# Patient Record
Sex: FEMALE | Race: BLACK OR AFRICAN AMERICAN | NOT HISPANIC OR LATINO | Employment: FULL TIME | URBAN - METROPOLITAN AREA
[De-identification: names, ages, dates, MRNs, and addresses within clinical notes are randomized per-mention and may not be internally consistent; named-entity substitution may affect disease eponyms.]

---

## 2017-06-29 ENCOUNTER — APPOINTMENT (OUTPATIENT)
Dept: RADIOLOGY | Facility: IMAGING CENTER | Age: 42
End: 2017-06-29
Attending: NURSE PRACTITIONER
Payer: COMMERCIAL

## 2017-06-29 ENCOUNTER — OFFICE VISIT (OUTPATIENT)
Dept: URGENT CARE | Facility: CLINIC | Age: 42
End: 2017-06-29
Payer: COMMERCIAL

## 2017-06-29 VITALS
OXYGEN SATURATION: 95 % | HEART RATE: 84 BPM | WEIGHT: 222 LBS | HEIGHT: 68 IN | RESPIRATION RATE: 16 BRPM | TEMPERATURE: 98.2 F | BODY MASS INDEX: 33.65 KG/M2

## 2017-06-29 DIAGNOSIS — S89.91XA KNEE INJURY, RIGHT, INITIAL ENCOUNTER: ICD-10-CM

## 2017-06-29 DIAGNOSIS — S89.92XA LEG INJURY, LEFT, INITIAL ENCOUNTER: ICD-10-CM

## 2017-06-29 DIAGNOSIS — S69.91XA HAND INJURY, RIGHT, INITIAL ENCOUNTER: ICD-10-CM

## 2017-06-29 DIAGNOSIS — S69.91XA WRIST INJURY, RIGHT, INITIAL ENCOUNTER: ICD-10-CM

## 2017-06-29 PROCEDURE — 73590 X-RAY EXAM OF LOWER LEG: CPT | Mod: TC | Performed by: NURSE PRACTITIONER

## 2017-06-29 PROCEDURE — 73562 X-RAY EXAM OF KNEE 3: CPT | Mod: TC | Performed by: NURSE PRACTITIONER

## 2017-06-29 PROCEDURE — 99203 OFFICE O/P NEW LOW 30 MIN: CPT | Performed by: NURSE PRACTITIONER

## 2017-06-29 PROCEDURE — 73130 X-RAY EXAM OF HAND: CPT | Mod: TC | Performed by: NURSE PRACTITIONER

## 2017-06-29 PROCEDURE — 73110 X-RAY EXAM OF WRIST: CPT | Mod: TC | Performed by: NURSE PRACTITIONER

## 2017-06-29 ASSESSMENT — ENCOUNTER SYMPTOMS
VOMITING: 0
DIZZINESS: 0
HEADACHES: 0
FEVER: 0
TINGLING: 0
CHILLS: 0
TREMORS: 0
NAUSEA: 0

## 2017-06-29 ASSESSMENT — PATIENT HEALTH QUESTIONNAIRE - PHQ9: CLINICAL INTERPRETATION OF PHQ2 SCORE: 0

## 2017-06-29 NOTE — MR AVS SNAPSHOT
"        Annalise Woodard   2017 1:45 PM   Office Visit   MRN: [id number]    Department:  [facility]   Dept Phone:  [phone]    Description:  Female : 1975   Provider:  Cathey J Hamman, A.P.N.           Reason for Visit     Leg Injury L leg,R knee, R hand fingers,neck pain, fell at the store over stool       Allergies as of 2017     No Known Allergies      You were diagnosed with     Leg injury, left, initial encounter   [6805804]       Knee injury, right, initial encounter   [[id number]]       Hand injury, right, initial encounter   [[id number]]       Wrist injury, right, initial encounter   [[id number]]         Vital Signs     Pulse Temperature Respirations Height Weight Body Mass Index    84 36.8 °C (98.2 °F) 16 1.727 m (5' 7.99\") 100.699 kg (222 lb) 33.76 kg/m2    Oxygen Saturation Last Menstrual Period Smoking Status             95% 2017 Never Smoker          Basic Information     Date Of Birth Sex Race Ethnicity Preferred Language    1975 Female Black or  Non- English      Health Maintenance     Patient has no pending health maintenance at this time      Current Immunizations     No immunizations on file.      Below and/or attached are the medications your provider expects you to take. Review all of your home medications and newly ordered medications with your provider and/or pharmacist. Follow medication instructions as directed by your provider and/or pharmacist. Please keep your medication list with you and share with your provider. Update the information when medications are discontinued, doses are changed, or new medications (including over-the-counter products) are added; and carry medication information at all times in the event of emergency situations     Allergies:  No Known Allergies          Medications  Valid as of: 2017 -  3:53 PM    Generic Name Brand Name Tablet Size Instructions for use    Hydrocodone-Acetaminophen (Tab) NORCO 5-325 MG " Take 1-2 Tabs by mouth every 6 hours as needed.        .                 Medicines prescribed today were sent to:     [facility] [address] [place], [place] - [address]    [address] [place] [address]    Phone: [phone] Fax: [phone]    Open 24 Hours?: No      Medication refill instructions:       If your prescription bottle indicates you have medication refills left, it is not necessary to call your provider’s office. Please contact your pharmacy and they will refill your medication.    If your prescription bottle indicates you do not have any refills left, you may request refills at any time through one of the following ways: The online Anaergia system (except Urgent Care), by calling your provider’s office, or by asking your pharmacy to contact your provider’s office with a refill request. Medication refills are processed only during regular business hours and may not be available until the next business day. Your provider may request additional information or to have a follow-up visit with you prior to refilling your medication.   *Please Note: Medication refills are assigned a new Rx number when refilled electronically. Your pharmacy may indicate that no refills were authorized even though a new prescription for the same medication is available at the pharmacy. Please request the medicine by name with the pharmacy before contacting your provider for a refill.        Your To Do List     Future Labs/Procedures Complete By Expires    DX-HAND 3+ RIGHT  As directed 6/29/2018    DX-KNEE 3 VIEWS RIGHT  As directed 6/29/2018    DX-TIBIA AND FIBULA LEFT  As directed 6/29/2018    DX-WRIST-COMPLETE 3+ RIGHT  As directed 6/29/2018         Anaergia Access Code: [email or web address]  Expires: 7/29/2017  3:53 PM    Anaergia  A secure, online tool to manage your health information     [employer]’s Anaergia® is a secure, online tool that connects you to your personalized health information from the privacy  of your home -- day or night - making it very easy for you to manage your healthcare. Once the activation process is completed, you can even access your medical information using the Piqora deejay, which is available for free in the Apple Deejay store or Google Play store.     Piqora provides the following levels of access (as shown below):   My Chart Features   [facility] Doctor [facility]  Specialists [facility]  Urgent  Care Non-[facility]  Primary Care  Doctor   Email your healthcare team securely and privately 24/7 X X X    Manage appointments: schedule your next appointment; view details of past/upcoming appointments X      Request prescription refills. X      View recent personal medical records, including lab and immunizations X X X X   View health record, including health history, allergies, medications X X X X   Read reports about your outpatient visits, procedures, consult and ER notes X X X X   See your discharge summary, which is a recap of your hospital and/or ER visit that includes your diagnosis, lab results, and care plan. X X       How to register for Piqora:  1. Go to  [email or web address].  2. Click on the Sign Up Now box, which takes you to the New Member Sign Up page. You will need to provide the following information:  a. Enter your Piqora Access Code exactly as it appears at the top of this page. (You will not need to use this code after you’ve completed the sign-up process. If you do not sign up before the expiration date, you must request a new code.)   b. Enter your date of birth.   c. Enter your home email address.   d. Click Submit, and follow the next screen’s instructions.  3. Create a Piqora ID. This will be your Piqora login ID and cannot be changed, so think of one that is secure and easy to remember.  4. Create a Piqora password. You can change your password at any time.  5. Enter your Password Reset Question and Answer. This can be used at a later time if you forget your  password.   6. Enter your e-mail address. This allows you to receive e-mail notifications when new information is available in Fullscreen.  7. Click Sign Up. You can now view your health information.    For assistance activating your Fullscreen account, call [phone]

## 2017-06-29 NOTE — PROGRESS NOTES
Subjective:      Annalise Woodard is a 41 y.o. female who presents with Leg Injury    Past Medical History   Diagnosis Date   • ASTHMA      Social History     Social History   • Marital Status: Single     Spouse Name: N/A   • Number of Children: N/A   • Years of Education: N/A     Occupational History   • Not on file.     Social History Main Topics   • Smoking status: Never Smoker    • Smokeless tobacco: Not on file   • Alcohol Use: Yes   • Drug Use: No   • Sexual Activity: Not on file     Other Topics Concern   • Not on file     Social History Narrative     History reviewed. No pertinent family history.    Allergies: Review of patient's allergies indicates no known allergies.    The patient presents today with complaint of pain to the lateral left leg, right knee, right hand, and right wrist. She states last night she tripped over a stool and fell. She is uncertain of how she fell but has been having pain since, and states she woke up this morning with increased stiffness. She denies head injury or loss of consciousness, denies neck injury or neck pain.            Leg Injury   The incident occurred 12 to 24 hours ago. The incident occurred at home. The quality of the pain is described as aching. The pain is moderate. The pain has been fluctuating since onset. Pertinent negatives include no tingling. She reports no foreign bodies present. The symptoms are aggravated by movement. She has tried rest for the symptoms. The treatment provided no relief.       Review of Systems   Constitutional: Positive for malaise/fatigue. Negative for fever and chills.   Gastrointestinal: Negative for nausea and vomiting.   Musculoskeletal:        Pain to the left leg, right knee, right hand and right wrist after a fall last night.    Skin: Negative.    Neurological: Negative for dizziness, tingling, tremors and headaches.       All other systems reviewed and are negative      Objective:     Pulse 84  Temp(Src) 36.8 °C (98.2 °F)  Resp  "16  Ht 1.727 m (5' 7.99\")  Wt 100.699 kg (222 lb)  BMI 33.76 kg/m2  SpO2 95%  LMP 06/03/2017     Physical Exam   Constitutional: She is oriented to person, place, and time. She appears well-developed and well-nourished. No distress.   Cardiovascular: Normal rate and regular rhythm.    Musculoskeletal:        Right knee: Tenderness found.        Arms:       Legs:  Neurological: She is alert and oriented to person, place, and time.   Skin: Skin is warm and dry. She is not diaphoretic.   Psychiatric: She has a normal mood and affect. Her behavior is normal.   Vitals reviewed.    XR right hand:     6/29/2017 2:25 PM    HISTORY/REASON FOR EXAM:  Pain/Deformity Following Trauma      TECHNIQUE/EXAM DESCRIPTION AND NUMBER OF VIEWS:  3 views of the RIGHT hand.    COMPARISON:  Plain films of the right wrist 6/19/2012    FINDINGS:  There is an exostosis arising from the distal phalanx of the third digit. No dislocation is identified. Mild deformity of the cortex of the first metacarpal is unchanged compared to prior. Tiny ossific density adjacent to the base of the proximal phalanx   of the fifth digit is of indeterminate age. There is mild periarticular osteopenia.           Impression        Tiny ossific density adjacent to the base of the proximal phalanx of the fifth digit is of indeterminate age.    Mild periarticular osteopenia.         XR right wrist:    6/29/2017 2:25 PM    HISTORY/REASON FOR EXAM:  Pain/Deformity Following Trauma      TECHNIQUE/EXAM DESCRIPTION AND NUMBER OF VIEWS:  4 views of the RIGHT wrist.    COMPARISON:  6/19/2012    FINDINGS:  No fracture or dislocation is seen. Mild deformity of the cortex of the first metacarpal is unchanged. Alignment is maintained.           Impression        No evidence of acute fracture or dislocation.         XR right knee:     6/29/2017 2:25 PM    HISTORY/REASON FOR EXAM:  Pain/Deformity Following Trauma  Pain    TECHNIQUE/EXAM DESCRIPTION AND NUMBER OF VIEWS:  3 " views of the RIGHT knee.    COMPARISON: None    FINDINGS:  There is no evidence of fracture or dislocation. Alignment is maintained. There is spurring of the greater tuberosity. A tiny ossific density adjacent to the tibial spines could represent a tiny loose body. No joint effusion is identified.           Impression        No evidence of acute fracture or dislocation.    Tiny ossific density adjacent to the tibial spines may represent a tiny loose body.         XR Left le/29/2017 2:25 PM    HISTORY/REASON FOR EXAM:  Pain/Deformity Following Trauma  Ground-level fall    TECHNIQUE/EXAM DESCRIPTION AND NUMBER OF VIEWS:  2 views of the LEFT tibia and fibula.    COMPARISON:  Plain films of the knee 2012    FINDINGS:  No fracture or dislocation is identified. Alignment is maintained. There is spurring of the greater tuberosity. There is spurring of the calcaneus at the insertion of the kidneys tendon and plantar fascia.           Impression        No evidence of acute fracture or dislocation     Discussed results of all x-rays with patient. Slight abnormalities are noted at the base of the right fifth finger and knee, significance is undetermined and may not be related to this injury. I did advise the patient that if her pain continues and had improvement over the next 7-10 days that I recommend reimaging. Patient verbalized understanding and agreement.       Assessment/Plan:   S/p fall  Contusions of the right hand and wrist, right knee, and left leg   -RICE   -ibuprofen   -return for persistent or worsening of symptoms   -declined wrist splint at this time.   There are no diagnoses linked to this encounter.

## 2019-04-08 ENCOUNTER — HOSPITAL ENCOUNTER (OUTPATIENT)
Facility: MEDICAL CENTER | Age: 44
End: 2019-04-09
Attending: EMERGENCY MEDICINE | Admitting: HOSPITALIST
Payer: COMMERCIAL

## 2019-04-08 ENCOUNTER — APPOINTMENT (OUTPATIENT)
Dept: RADIOLOGY | Facility: MEDICAL CENTER | Age: 44
End: 2019-04-08
Attending: SURGERY
Payer: COMMERCIAL

## 2019-04-08 ENCOUNTER — APPOINTMENT (OUTPATIENT)
Dept: RADIOLOGY | Facility: MEDICAL CENTER | Age: 44
End: 2019-04-08
Attending: EMERGENCY MEDICINE
Payer: COMMERCIAL

## 2019-04-08 ENCOUNTER — ANESTHESIA EVENT (OUTPATIENT)
Dept: SURGERY | Facility: MEDICAL CENTER | Age: 44
End: 2019-04-08
Payer: COMMERCIAL

## 2019-04-08 ENCOUNTER — ANESTHESIA (OUTPATIENT)
Dept: SURGERY | Facility: MEDICAL CENTER | Age: 44
End: 2019-04-08
Payer: COMMERCIAL

## 2019-04-08 ENCOUNTER — NON-PROVIDER VISIT (OUTPATIENT)
Dept: OCCUPATIONAL MEDICINE | Facility: CLINIC | Age: 44
End: 2019-04-08
Payer: COMMERCIAL

## 2019-04-08 DIAGNOSIS — Z02.83 ENCOUNTER FOR DRUG SCREENING: ICD-10-CM

## 2019-04-08 DIAGNOSIS — M32.8 OTHER FORMS OF SYSTEMIC LUPUS ERYTHEMATOSUS, UNSPECIFIED ORGAN INVOLVEMENT STATUS (HCC): ICD-10-CM

## 2019-04-08 DIAGNOSIS — S82.892A CLOSED FRACTURE OF LEFT ANKLE, INITIAL ENCOUNTER: ICD-10-CM

## 2019-04-08 DIAGNOSIS — S82.852A TRIMALLEOLAR FRACTURE OF ANKLE, CLOSED, LEFT, INITIAL ENCOUNTER: ICD-10-CM

## 2019-04-08 PROBLEM — I10 ESSENTIAL HYPERTENSION: Status: ACTIVE | Noted: 2019-04-08

## 2019-04-08 PROBLEM — J45.20 MILD INTERMITTENT ASTHMA WITHOUT COMPLICATION: Status: ACTIVE | Noted: 2019-04-08

## 2019-04-08 LAB
ALBUMIN SERPL BCP-MCNC: 3.8 G/DL (ref 3.2–4.9)
ALBUMIN/GLOB SERPL: 1.1 G/DL
ALP SERPL-CCNC: 52 U/L (ref 30–99)
ALT SERPL-CCNC: 12 U/L (ref 2–50)
ANION GAP SERPL CALC-SCNC: 7 MMOL/L (ref 0–11.9)
AST SERPL-CCNC: 22 U/L (ref 12–45)
BASOPHILS # BLD AUTO: 0.2 % (ref 0–1.8)
BASOPHILS # BLD: 0.01 K/UL (ref 0–0.12)
BILIRUB SERPL-MCNC: 1 MG/DL (ref 0.1–1.5)
BUN SERPL-MCNC: 16 MG/DL (ref 8–22)
CALCIUM SERPL-MCNC: 8.7 MG/DL (ref 8.4–10.2)
CHLORIDE SERPL-SCNC: 104 MMOL/L (ref 96–112)
CO2 SERPL-SCNC: 24 MMOL/L (ref 20–33)
CREAT SERPL-MCNC: 0.78 MG/DL (ref 0.5–1.4)
EOSINOPHIL # BLD AUTO: 0.07 K/UL (ref 0–0.51)
EOSINOPHIL NFR BLD: 1.1 % (ref 0–6.9)
ERYTHROCYTE [DISTWIDTH] IN BLOOD BY AUTOMATED COUNT: 47.7 FL (ref 35.9–50)
GLOBULIN SER CALC-MCNC: 3.4 G/DL (ref 1.9–3.5)
GLUCOSE SERPL-MCNC: 89 MG/DL (ref 65–99)
HCG SERPL QL: NEGATIVE
HCT VFR BLD AUTO: 39.2 % (ref 37–47)
HGB BLD-MCNC: 12.9 G/DL (ref 12–16)
IMM GRANULOCYTES # BLD AUTO: 0.02 K/UL (ref 0–0.11)
IMM GRANULOCYTES NFR BLD AUTO: 0.3 % (ref 0–0.9)
LYMPHOCYTES # BLD AUTO: 0.97 K/UL (ref 1–4.8)
LYMPHOCYTES NFR BLD: 15.7 % (ref 22–41)
MCH RBC QN AUTO: 28.4 PG (ref 27–33)
MCHC RBC AUTO-ENTMCNC: 32.9 G/DL (ref 33.6–35)
MCV RBC AUTO: 86.3 FL (ref 81.4–97.8)
MONOCYTES # BLD AUTO: 0.42 K/UL (ref 0–0.85)
MONOCYTES NFR BLD AUTO: 6.8 % (ref 0–13.4)
NEUTROPHILS # BLD AUTO: 4.67 K/UL (ref 2–7.15)
NEUTROPHILS NFR BLD: 75.9 % (ref 44–72)
NRBC # BLD AUTO: 0 K/UL
NRBC BLD-RTO: 0 /100 WBC
PLATELET # BLD AUTO: 223 K/UL (ref 164–446)
PMV BLD AUTO: 9.9 FL (ref 9–12.9)
POTASSIUM SERPL-SCNC: 4 MMOL/L (ref 3.6–5.5)
PROT SERPL-MCNC: 7.2 G/DL (ref 6–8.2)
RBC # BLD AUTO: 4.54 M/UL (ref 4.2–5.4)
SODIUM SERPL-SCNC: 135 MMOL/L (ref 135–145)
WBC # BLD AUTO: 6.2 K/UL (ref 4.8–10.8)

## 2019-04-08 PROCEDURE — 160002 HCHG RECOVERY MINUTES (STAT): Performed by: SURGERY

## 2019-04-08 PROCEDURE — 160029 HCHG SURGERY MINUTES - 1ST 30 MINS LEVEL 4: Performed by: SURGERY

## 2019-04-08 PROCEDURE — 73590 X-RAY EXAM OF LOWER LEG: CPT | Mod: LT

## 2019-04-08 PROCEDURE — 96375 TX/PRO/DX INJ NEW DRUG ADDON: CPT

## 2019-04-08 PROCEDURE — 160035 HCHG PACU - 1ST 60 MINS PHASE I: Performed by: SURGERY

## 2019-04-08 PROCEDURE — 36415 COLL VENOUS BLD VENIPUNCTURE: CPT

## 2019-04-08 PROCEDURE — 700111 HCHG RX REV CODE 636 W/ 250 OVERRIDE (IP): Performed by: ANESTHESIOLOGY

## 2019-04-08 PROCEDURE — 500881 HCHG PACK, EXTREMITY: Performed by: SURGERY

## 2019-04-08 PROCEDURE — 160048 HCHG OR STATISTICAL LEVEL 1-5: Performed by: SURGERY

## 2019-04-08 PROCEDURE — 700101 HCHG RX REV CODE 250

## 2019-04-08 PROCEDURE — 700111 HCHG RX REV CODE 636 W/ 250 OVERRIDE (IP): Performed by: EMERGENCY MEDICINE

## 2019-04-08 PROCEDURE — 99285 EMERGENCY DEPT VISIT HI MDM: CPT

## 2019-04-08 PROCEDURE — 27840 TREAT ANKLE DISLOCATION: CPT

## 2019-04-08 PROCEDURE — 160041 HCHG SURGERY MINUTES - EA ADDL 1 MIN LEVEL 4: Performed by: SURGERY

## 2019-04-08 PROCEDURE — 160009 HCHG ANES TIME/MIN: Performed by: SURGERY

## 2019-04-08 PROCEDURE — 700102 HCHG RX REV CODE 250 W/ 637 OVERRIDE(OP): Performed by: ANESTHESIOLOGY

## 2019-04-08 PROCEDURE — 73610 X-RAY EXAM OF ANKLE: CPT | Mod: LT

## 2019-04-08 PROCEDURE — 302875 HCHG BANDAGE ACE 4 OR 6""

## 2019-04-08 PROCEDURE — 84703 CHORIONIC GONADOTROPIN ASSAY: CPT

## 2019-04-08 PROCEDURE — 700111 HCHG RX REV CODE 636 W/ 250 OVERRIDE (IP): Performed by: HOSPITALIST

## 2019-04-08 PROCEDURE — 700101 HCHG RX REV CODE 250: Performed by: ANESTHESIOLOGY

## 2019-04-08 PROCEDURE — G0378 HOSPITAL OBSERVATION PER HR: HCPCS

## 2019-04-08 PROCEDURE — 700105 HCHG RX REV CODE 258: Performed by: HOSPITALIST

## 2019-04-08 PROCEDURE — 99152 MOD SED SAME PHYS/QHP 5/>YRS: CPT

## 2019-04-08 PROCEDURE — 501445 HCHG STAPLER, SKIN DISP: Performed by: SURGERY

## 2019-04-08 PROCEDURE — 82075 ASSAY OF BREATH ETHANOL: CPT | Performed by: PREVENTIVE MEDICINE

## 2019-04-08 PROCEDURE — 29515 APPLICATION SHORT LEG SPLINT: CPT

## 2019-04-08 PROCEDURE — 99225 PR SUBSEQUENT OBSERVATION CARE,LEVEL II: CPT | Performed by: HOSPITALIST

## 2019-04-08 PROCEDURE — 85025 COMPLETE CBC W/AUTO DIFF WBC: CPT

## 2019-04-08 PROCEDURE — 99026 IN-HOSPITAL ON CALL SERVICE: CPT | Performed by: PREVENTIVE MEDICINE

## 2019-04-08 PROCEDURE — 73600 X-RAY EXAM OF ANKLE: CPT | Mod: RT

## 2019-04-08 PROCEDURE — 700111 HCHG RX REV CODE 636 W/ 250 OVERRIDE (IP)

## 2019-04-08 PROCEDURE — A9270 NON-COVERED ITEM OR SERVICE: HCPCS | Performed by: ANESTHESIOLOGY

## 2019-04-08 PROCEDURE — 80053 COMPREHEN METABOLIC PANEL: CPT

## 2019-04-08 PROCEDURE — 501838 HCHG SUTURE GENERAL: Performed by: SURGERY

## 2019-04-08 PROCEDURE — 96374 THER/PROPH/DIAG INJ IV PUSH: CPT

## 2019-04-08 PROCEDURE — 94770 HCHG CO2 EXPIRED GAS DETERMINATION: CPT

## 2019-04-08 PROCEDURE — 96376 TX/PRO/DX INJ SAME DRUG ADON: CPT

## 2019-04-08 PROCEDURE — C1713 ANCHOR/SCREW BN/BN,TIS/BN: HCPCS | Performed by: SURGERY

## 2019-04-08 DEVICE — SCREW CORTEX SELF TAPPING VLP STERILE 3.5MMX14MM (3TX12+2TX4=44): Type: IMPLANTABLE DEVICE | Site: ANKLE | Status: FUNCTIONAL

## 2019-04-08 DEVICE — IMPLANTABLE DEVICE: Type: IMPLANTABLE DEVICE | Site: ANKLE | Status: FUNCTIONAL

## 2019-04-08 DEVICE — SCREW CORTEX LOCKING SELF TAPPING STERILE 3.5MM X 16MM (3TX12+2TX4=44): Type: IMPLANTABLE DEVICE | Site: ANKLE | Status: FUNCTIONAL

## 2019-04-08 DEVICE — SCREW CORTEX LOCKING SELF TAPPING STERILE 3.5MM X 12MM (3TX12+2TX4=44): Type: IMPLANTABLE DEVICE | Site: ANKLE | Status: FUNCTIONAL

## 2019-04-08 DEVICE — SCREW CORTEX SELF TAPPING VLP STERILE 3.5MMX12MM (3TX12+2TX4=44): Type: IMPLANTABLE DEVICE | Site: ANKLE | Status: FUNCTIONAL

## 2019-04-08 DEVICE — SCREW CORTEX LOCKING SELF TAPPING STERILE 3.5MM X 14MM (3TX12+2TX4=44): Type: IMPLANTABLE DEVICE | Site: ANKLE | Status: FUNCTIONAL

## 2019-04-08 RX ORDER — METOPROLOL TARTRATE 1 MG/ML
1 INJECTION, SOLUTION INTRAVENOUS
Status: DISCONTINUED | OUTPATIENT
Start: 2019-04-08 | End: 2019-04-08 | Stop reason: HOSPADM

## 2019-04-08 RX ORDER — FOLIC ACID 1 MG/1
1 TABLET ORAL DAILY
COMMUNITY

## 2019-04-08 RX ORDER — ONDANSETRON 2 MG/ML
4 INJECTION INTRAMUSCULAR; INTRAVENOUS EVERY 4 HOURS PRN
Status: DISCONTINUED | OUTPATIENT
Start: 2019-04-08 | End: 2019-04-09 | Stop reason: HOSPADM

## 2019-04-08 RX ORDER — SODIUM CHLORIDE, SODIUM LACTATE, POTASSIUM CHLORIDE, CALCIUM CHLORIDE 600; 310; 30; 20 MG/100ML; MG/100ML; MG/100ML; MG/100ML
INJECTION, SOLUTION INTRAVENOUS CONTINUOUS
Status: DISCONTINUED | OUTPATIENT
Start: 2019-04-08 | End: 2019-04-09 | Stop reason: HOSPADM

## 2019-04-08 RX ORDER — PROMETHAZINE HYDROCHLORIDE 25 MG/1
12.5-25 SUPPOSITORY RECTAL EVERY 4 HOURS PRN
Status: DISCONTINUED | OUTPATIENT
Start: 2019-04-08 | End: 2019-04-09 | Stop reason: HOSPADM

## 2019-04-08 RX ORDER — HYDROCHLOROTHIAZIDE 25 MG/1
25 TABLET ORAL DAILY
COMMUNITY
End: 2020-01-09

## 2019-04-08 RX ORDER — OXYCODONE HYDROCHLORIDE 5 MG/1
5 TABLET ORAL
Status: DISCONTINUED | OUTPATIENT
Start: 2019-04-08 | End: 2019-04-09 | Stop reason: HOSPADM

## 2019-04-08 RX ORDER — OXYCODONE HYDROCHLORIDE 10 MG/1
10 TABLET ORAL
Status: DISCONTINUED | OUTPATIENT
Start: 2019-04-08 | End: 2019-04-09 | Stop reason: HOSPADM

## 2019-04-08 RX ORDER — LISINOPRIL AND HYDROCHLOROTHIAZIDE 12.5; 1 MG/1; MG/1
1 TABLET ORAL DAILY
COMMUNITY
End: 2019-12-01

## 2019-04-08 RX ORDER — ACETAMINOPHEN 325 MG/1
650 TABLET ORAL EVERY 6 HOURS PRN
Status: DISCONTINUED | OUTPATIENT
Start: 2019-04-08 | End: 2019-04-09 | Stop reason: HOSPADM

## 2019-04-08 RX ORDER — HYDRALAZINE HYDROCHLORIDE 20 MG/ML
5 INJECTION INTRAMUSCULAR; INTRAVENOUS
Status: DISCONTINUED | OUTPATIENT
Start: 2019-04-08 | End: 2019-04-08 | Stop reason: HOSPADM

## 2019-04-08 RX ORDER — HALOPERIDOL 5 MG/ML
1 INJECTION INTRAMUSCULAR
Status: DISCONTINUED | OUTPATIENT
Start: 2019-04-08 | End: 2019-04-08 | Stop reason: HOSPADM

## 2019-04-08 RX ORDER — POLYETHYLENE GLYCOL 3350 17 G/17G
1 POWDER, FOR SOLUTION ORAL
Status: DISCONTINUED | OUTPATIENT
Start: 2019-04-08 | End: 2019-04-09 | Stop reason: HOSPADM

## 2019-04-08 RX ORDER — FOLIC ACID 1 MG/1
1 TABLET ORAL DAILY
Status: DISCONTINUED | OUTPATIENT
Start: 2019-04-08 | End: 2019-04-09 | Stop reason: HOSPADM

## 2019-04-08 RX ORDER — HYDROCHLOROTHIAZIDE 25 MG/1
25 TABLET ORAL DAILY
Status: DISCONTINUED | OUTPATIENT
Start: 2019-04-08 | End: 2019-04-09 | Stop reason: HOSPADM

## 2019-04-08 RX ORDER — OXYCODONE HYDROCHLORIDE 5 MG/1
5-10 TABLET ORAL EVERY 4 HOURS PRN
Qty: 30 TAB | Refills: 0 | Status: SHIPPED | OUTPATIENT
Start: 2019-04-08 | End: 2019-04-09

## 2019-04-08 RX ORDER — DEXAMETHASONE SODIUM PHOSPHATE 4 MG/ML
INJECTION, SOLUTION INTRA-ARTICULAR; INTRALESIONAL; INTRAMUSCULAR; INTRAVENOUS; SOFT TISSUE PRN
Status: DISCONTINUED | OUTPATIENT
Start: 2019-04-08 | End: 2019-04-08 | Stop reason: SURG

## 2019-04-08 RX ORDER — ONDANSETRON 2 MG/ML
4 INJECTION INTRAMUSCULAR; INTRAVENOUS
Status: DISCONTINUED | OUTPATIENT
Start: 2019-04-08 | End: 2019-04-08 | Stop reason: HOSPADM

## 2019-04-08 RX ORDER — HYDROMORPHONE HYDROCHLORIDE 1 MG/ML
0.4 INJECTION, SOLUTION INTRAMUSCULAR; INTRAVENOUS; SUBCUTANEOUS
Status: DISCONTINUED | OUTPATIENT
Start: 2019-04-08 | End: 2019-04-08 | Stop reason: HOSPADM

## 2019-04-08 RX ORDER — NAPROXEN 250 MG/1
250 TABLET ORAL 2 TIMES DAILY PRN
Status: DISCONTINUED | OUTPATIENT
Start: 2019-04-08 | End: 2019-04-09 | Stop reason: HOSPADM

## 2019-04-08 RX ORDER — BISACODYL 10 MG
10 SUPPOSITORY, RECTAL RECTAL
Status: DISCONTINUED | OUTPATIENT
Start: 2019-04-08 | End: 2019-04-09 | Stop reason: HOSPADM

## 2019-04-08 RX ORDER — NAPROXEN SODIUM 220 MG
440 TABLET ORAL PRN
Status: DISCONTINUED | OUTPATIENT
Start: 2019-04-08 | End: 2019-04-08

## 2019-04-08 RX ORDER — ROPIVACAINE HYDROCHLORIDE 5 MG/ML
INJECTION, SOLUTION EPIDURAL; INFILTRATION; PERINEURAL PRN
Status: DISCONTINUED | OUTPATIENT
Start: 2019-04-08 | End: 2019-04-08 | Stop reason: SURG

## 2019-04-08 RX ORDER — DIPHENHYDRAMINE HYDROCHLORIDE 50 MG/ML
12.5 INJECTION INTRAMUSCULAR; INTRAVENOUS
Status: DISCONTINUED | OUTPATIENT
Start: 2019-04-08 | End: 2019-04-08 | Stop reason: HOSPADM

## 2019-04-08 RX ORDER — ROPIVACAINE HYDROCHLORIDE 5 MG/ML
INJECTION, SOLUTION EPIDURAL; INFILTRATION; PERINEURAL
Status: COMPLETED
Start: 2019-04-08 | End: 2019-04-08

## 2019-04-08 RX ORDER — KETOROLAC TROMETHAMINE 30 MG/ML
INJECTION, SOLUTION INTRAMUSCULAR; INTRAVENOUS PRN
Status: DISCONTINUED | OUTPATIENT
Start: 2019-04-08 | End: 2019-04-08 | Stop reason: SURG

## 2019-04-08 RX ORDER — OXYCODONE HCL 5 MG/5 ML
10 SOLUTION, ORAL ORAL
Status: COMPLETED | OUTPATIENT
Start: 2019-04-08 | End: 2019-04-08

## 2019-04-08 RX ORDER — AMOXICILLIN 250 MG
2 CAPSULE ORAL 2 TIMES DAILY
Status: DISCONTINUED | OUTPATIENT
Start: 2019-04-08 | End: 2019-04-09 | Stop reason: HOSPADM

## 2019-04-08 RX ORDER — CEFAZOLIN SODIUM 1 G/3ML
INJECTION, POWDER, FOR SOLUTION INTRAMUSCULAR; INTRAVENOUS PRN
Status: DISCONTINUED | OUTPATIENT
Start: 2019-04-08 | End: 2019-04-08 | Stop reason: SURG

## 2019-04-08 RX ORDER — MIDAZOLAM HYDROCHLORIDE 1 MG/ML
INJECTION INTRAMUSCULAR; INTRAVENOUS
Status: COMPLETED
Start: 2019-04-08 | End: 2019-04-08

## 2019-04-08 RX ORDER — PROMETHAZINE HYDROCHLORIDE 25 MG/1
12.5-25 TABLET ORAL EVERY 4 HOURS PRN
Status: DISCONTINUED | OUTPATIENT
Start: 2019-04-08 | End: 2019-04-09 | Stop reason: HOSPADM

## 2019-04-08 RX ORDER — LISINOPRIL 5 MG/1
10 TABLET ORAL
Status: DISCONTINUED | OUTPATIENT
Start: 2019-04-08 | End: 2019-04-09 | Stop reason: HOSPADM

## 2019-04-08 RX ORDER — ONDANSETRON 4 MG/1
4 TABLET, ORALLY DISINTEGRATING ORAL EVERY 4 HOURS PRN
Status: DISCONTINUED | OUTPATIENT
Start: 2019-04-08 | End: 2019-04-09 | Stop reason: HOSPADM

## 2019-04-08 RX ORDER — HYDROMORPHONE HYDROCHLORIDE 1 MG/ML
0.6 INJECTION, SOLUTION INTRAMUSCULAR; INTRAVENOUS; SUBCUTANEOUS
Status: DISCONTINUED | OUTPATIENT
Start: 2019-04-08 | End: 2019-04-08 | Stop reason: HOSPADM

## 2019-04-08 RX ORDER — OXYCODONE HCL 5 MG/5 ML
5 SOLUTION, ORAL ORAL
Status: COMPLETED | OUTPATIENT
Start: 2019-04-08 | End: 2019-04-08

## 2019-04-08 RX ORDER — MEPERIDINE HYDROCHLORIDE 25 MG/ML
12.5 INJECTION INTRAMUSCULAR; INTRAVENOUS; SUBCUTANEOUS
Status: DISCONTINUED | OUTPATIENT
Start: 2019-04-08 | End: 2019-04-08 | Stop reason: HOSPADM

## 2019-04-08 RX ORDER — MORPHINE SULFATE 4 MG/ML
4 INJECTION, SOLUTION INTRAMUSCULAR; INTRAVENOUS ONCE
Status: COMPLETED | OUTPATIENT
Start: 2019-04-08 | End: 2019-04-08

## 2019-04-08 RX ORDER — MORPHINE SULFATE 4 MG/ML
4 INJECTION, SOLUTION INTRAMUSCULAR; INTRAVENOUS
Status: DISCONTINUED | OUTPATIENT
Start: 2019-04-08 | End: 2019-04-09 | Stop reason: HOSPADM

## 2019-04-08 RX ORDER — HYDROMORPHONE HYDROCHLORIDE 1 MG/ML
0.2 INJECTION, SOLUTION INTRAMUSCULAR; INTRAVENOUS; SUBCUTANEOUS
Status: DISCONTINUED | OUTPATIENT
Start: 2019-04-08 | End: 2019-04-08 | Stop reason: HOSPADM

## 2019-04-08 RX ORDER — SODIUM CHLORIDE, SODIUM LACTATE, POTASSIUM CHLORIDE, CALCIUM CHLORIDE 600; 310; 30; 20 MG/100ML; MG/100ML; MG/100ML; MG/100ML
1000 INJECTION, SOLUTION INTRAVENOUS
Status: DISCONTINUED | OUTPATIENT
Start: 2019-04-08 | End: 2019-04-08 | Stop reason: HOSPADM

## 2019-04-08 RX ORDER — ONDANSETRON 2 MG/ML
4 INJECTION INTRAMUSCULAR; INTRAVENOUS ONCE
Status: COMPLETED | OUTPATIENT
Start: 2019-04-08 | End: 2019-04-08

## 2019-04-08 RX ORDER — NAPROXEN SODIUM 220 MG
440 TABLET ORAL PRN
COMMUNITY
End: 2019-12-01

## 2019-04-08 RX ADMIN — FENTANYL CITRATE 50 MCG: 50 INJECTION, SOLUTION INTRAMUSCULAR; INTRAVENOUS at 15:20

## 2019-04-08 RX ADMIN — MIDAZOLAM HYDROCHLORIDE 2 MG: 1 INJECTION, SOLUTION INTRAMUSCULAR; INTRAVENOUS at 14:57

## 2019-04-08 RX ADMIN — MIDAZOLAM HYDROCHLORIDE 2 MG: 1 INJECTION, SOLUTION INTRAMUSCULAR; INTRAVENOUS at 14:50

## 2019-04-08 RX ADMIN — PROPOFOL 200 MG: 10 INJECTION, EMULSION INTRAVENOUS at 14:57

## 2019-04-08 RX ADMIN — SODIUM CHLORIDE, POTASSIUM CHLORIDE, SODIUM LACTATE AND CALCIUM CHLORIDE: 600; 310; 30; 20 INJECTION, SOLUTION INTRAVENOUS at 14:49

## 2019-04-08 RX ADMIN — ROPIVACAINE HYDROCHLORIDE 30 ML: 5 INJECTION, SOLUTION EPIDURAL; INFILTRATION; PERINEURAL at 14:42

## 2019-04-08 RX ADMIN — MORPHINE SULFATE 4 MG: 4 INJECTION INTRAVENOUS at 09:43

## 2019-04-08 RX ADMIN — FENTANYL CITRATE 25 MCG: 50 INJECTION INTRAMUSCULAR; INTRAVENOUS at 16:14

## 2019-04-08 RX ADMIN — SODIUM CHLORIDE, SODIUM LACTATE, POTASSIUM CHLORIDE, CALCIUM CHLORIDE 1000 ML: 600; 310; 30; 20 INJECTION, SOLUTION INTRAVENOUS at 14:09

## 2019-04-08 RX ADMIN — MORPHINE SULFATE 4 MG: 4 INJECTION INTRAVENOUS at 17:31

## 2019-04-08 RX ADMIN — OXYCODONE HYDROCHLORIDE 5 MG: 5 SOLUTION ORAL at 16:13

## 2019-04-08 RX ADMIN — ONDANSETRON 4 MG: 2 INJECTION INTRAMUSCULAR; INTRAVENOUS at 15:40

## 2019-04-08 RX ADMIN — CEFAZOLIN 2 G: 1 INJECTION, POWDER, FOR SOLUTION INTRAVENOUS at 15:00

## 2019-04-08 RX ADMIN — ONDANSETRON 4 MG: 2 INJECTION INTRAMUSCULAR; INTRAVENOUS at 09:43

## 2019-04-08 RX ADMIN — DEXAMETHASONE SODIUM PHOSPHATE 8 MG: 4 INJECTION, SOLUTION INTRAMUSCULAR; INTRAVENOUS at 15:10

## 2019-04-08 RX ADMIN — FENTANYL CITRATE 100 MCG: 50 INJECTION, SOLUTION INTRAMUSCULAR; INTRAVENOUS at 14:57

## 2019-04-08 RX ADMIN — METHOTREXATE 10 MG: 2.5 TABLET ORAL at 21:21

## 2019-04-08 RX ADMIN — SODIUM CHLORIDE, POTASSIUM CHLORIDE, SODIUM LACTATE AND CALCIUM CHLORIDE: 600; 310; 30; 20 INJECTION, SOLUTION INTRAVENOUS at 17:33

## 2019-04-08 RX ADMIN — KETOROLAC TROMETHAMINE 30 MG: 30 INJECTION, SOLUTION INTRAMUSCULAR at 15:35

## 2019-04-08 RX ADMIN — MORPHINE SULFATE 4 MG: 4 INJECTION INTRAVENOUS at 22:08

## 2019-04-08 ASSESSMENT — ENCOUNTER SYMPTOMS
LOSS OF CONSCIOUSNESS: 0
COUGH: 0
FEVER: 0
BACK PAIN: 0
DIARRHEA: 0
ABDOMINAL PAIN: 0
SHORTNESS OF BREATH: 0
NAUSEA: 0
VOMITING: 0
HEADACHES: 0
DIZZINESS: 0
CHILLS: 0

## 2019-04-08 ASSESSMENT — LIFESTYLE VARIABLES
CONSUMPTION TOTAL: NEGATIVE
HAVE PEOPLE ANNOYED YOU BY CRITICIZING YOUR DRINKING: NO
ALCOHOL_USE: YES
AVERAGE NUMBER OF DAYS PER WEEK YOU HAVE A DRINK CONTAINING ALCOHOL: 3
EVER HAD A DRINK FIRST THING IN THE MORNING TO STEADY YOUR NERVES TO GET RID OF A HANGOVER: NO
EVER FELT BAD OR GUILTY ABOUT YOUR DRINKING: NO
TOTAL SCORE: 0
TOTAL SCORE: 0
ON A TYPICAL DAY WHEN YOU DRINK ALCOHOL HOW MANY DRINKS DO YOU HAVE: 1
HOW MANY TIMES IN THE PAST YEAR HAVE YOU HAD 5 OR MORE DRINKS IN A DAY: 0
ALCOHOL_USE: NO
TOTAL SCORE: 0
HAVE YOU EVER FELT YOU SHOULD CUT DOWN ON YOUR DRINKING: NO

## 2019-04-08 ASSESSMENT — COGNITIVE AND FUNCTIONAL STATUS - GENERAL
SUGGESTED CMS G CODE MODIFIER DAILY ACTIVITY: CH
MOBILITY SCORE: 24
DAILY ACTIVITIY SCORE: 24
SUGGESTED CMS G CODE MODIFIER MOBILITY: CH

## 2019-04-08 ASSESSMENT — PATIENT HEALTH QUESTIONNAIRE - PHQ9
2. FEELING DOWN, DEPRESSED, IRRITABLE, OR HOPELESS: NOT AT ALL
SUM OF ALL RESPONSES TO PHQ9 QUESTIONS 1 AND 2: 0
1. LITTLE INTEREST OR PLEASURE IN DOING THINGS: NOT AT ALL

## 2019-04-08 ASSESSMENT — PAIN SCALES - GENERAL: PAIN_LEVEL: 2

## 2019-04-08 NOTE — ANESTHESIA PROCEDURE NOTES
Peripheral Block  Performed by: MARY ANN GHOSH  Authorized by: MARY ANN GHOSH     Patient Location:  Pre-op  Start Time:  4/8/2019 2:42 PM  End Time:  4/8/2019 2:45 PM  Reason for Block: at surgeon's request and post-op pain management    patient identified, IV checked, site marked, risks and benefits discussed, surgical consent, monitors and equipment checked, pre-op evaluation and timeout performed    Patient Position:  Supine  Prep: ChloraPrep    Monitoring:  Continuous pulse ox  Block Region:  Lower Extremity  Lower Extremity - Block Type:  Popliteal  Laterality:  Left  Procedures: ultrasound guided  Image captured, interpreted and electronically stored.    Local Infiltration:  Lidocaine  Strength:  1 %  Dose:  5 ml  Block Type:  Single-shot  Needle Length:  150mm  Needle Gauge:  20 G  Needle Localization:  Ultrasound guidance  Injection Assessment:  Negative aspiration for heme, local visualized surrounding nerve on ultrasound, incremental injection and no paresthesia on injection  Evidence of intravascular injection: No

## 2019-04-08 NOTE — OR NURSING
1556: To PACU from OR via bed, rouses briefly to deny pain and nausea, respirations spontaneous and non-labored. Icepack applied over c/d/i LLE surgical dressings. LLE elevated on pillows and FOB elevated.  1610: More awake, c/o LLE pain - plan analgesia  1622: Drowsier, verbalizes decrease in pain, encouraged to sleep and allow onset of po analgesia.   1625: sleeping - not roused at this time.  1640: Rouses to name, verbalizes pain control. No change in surgical site assessment. Meets criteria to transfer to floor.

## 2019-04-08 NOTE — ED NOTES
Med rec updated and complete  Allergies reviewed  Pt had a list of medications on her phone, went over list of medications and returned list of medications back to pt.  Pt reports no antibiotics in the last 30 days.

## 2019-04-08 NOTE — ANESTHESIA TIME REPORT
Anesthesia Start and Stop Event Times     Date Time Event    4/8/2019 1449 Anesthesia Start     1559 Anesthesia Stop        Responsible Staff  04/08/19    Name Role Begin End    Leonid Esposito M.D. Anesth 1449 1551        Preop Diagnosis (Free Text):  Pre-op Diagnosis     left ankle trimalleolar fracture dislocation        Preop Diagnosis (Codes):  Diagnosis Information     Diagnosis Code(s):         Post op Diagnosis  Closed fracture dislocation of left ankle with malunion, subsequent encounter   left ankle trimalleolar fracture dislocation    Premium Reason  A. 3PM - 7AM    Comments: Block in Preop, Premium Vaibhav

## 2019-04-08 NOTE — LETTER
"  FORM C-4:  EMPLOYEE’S CLAIM FOR COMPENSATION/ REPORT OF INITIAL TREATMENT  EMPLOYEE’S CLAIM - PROVIDE ALL INFORMATION REQUESTED   First Name  Annalise Last Name  Vance Birthdate             Age  1975 43 y.o. Sex  female Claim Number   Home Employee Address  [address]   [facility]                                     Zip  [address] Height  1.702 m (5' 7\") Weight  104.3 kg (230 lb) [facility]     Mailing Employee Address                           [address]    [facility]               Zip  [address] Telephone  [phone] (home)  Primary Language Spoken  ENGLISH   Insurer   Third Party   [employer] Employee's Occupation (Job Title) When Injury or Occupational Disease Occurred     Employer's Name  [employer] Telephone  [phone]    Employer Address  [address] [facility] [id number]] Zip  [address]   Date of Injury  4/8/2019       Hour of Injury  7:04 AM Date Employer Notified  4/8/2019 Last Day of Work after Injury or Occupational Disease  4/8/2019 Supervisor to Whom Injury Reported  [facility]   Address or Location of Accident (if applicable)  [address]   What were you doing at the time of accident? (if applicable)  walking down stairs    How did this injury or occupational disease occur? Be specific and answer in detail. Use additional sheet if necessary)  Slipped walking down stairs   If you believe that you have an occupational disease, when did you first have knowledge of the disability and it relationship to your employment?  n/a Witnesses to the Accident  n/a     Nature of Injury or Occupational Disease  Workers' Compensation  Part(s) of Body Injured or Affected  Ankle (L), N/A, N/A    I certify that the above is true and correct to the best of my knowledge and that I have provided this information in order to obtain the benefits of [place] " [place]  I hereby authorize any physician, chiropractor, surgeon, practitioner, or other person, any hospital, including [facility] or [facility] hospital, any medical service organization, any insurance company, or other institution or organization to release to each other, any medical or other information, including benefits paid or payable, pertinent to this injury or disease, except information relative to diagnosis, treatment and/or counseling for AIDS, psychological conditions, alcohol or controlled substances, for which I must give specific authorization.  A Photostat of this authorization shall be as valid as the original.   Date  04/08/2019 Place  [facility] Employee’s Signature   THIS REPORT MUST BE COMPLETED AND MAILED WITHIN 3 WORKING DAYS OF TREATMENT   Place  GEN SURGERY [facility]  Name of Facility   [facility]   Date  4/8/2019 Diagnosis  (S82.892A) Closed fracture of left ankle, initial encounter Is there evidence the injured employee was under the influence of alcohol and/or another controlled substance at the time of accident?   Hour  1:35 PM Description of Injury or Disease  Closed fracture of left ankle, initial encounter No   Treatment  Surgery  Have you advised the patient to remain off work five days or more?         Yes   X-Ray Findings  Positive   If Yes   From Date    To Date      From information given by the employee, together with medical evidence, can you directly connect this injury or occupational disease as job incurred?    If No, is the employee capable of: Full Duty  No Modified Duty      Is additional medical care by a physician indicated?  Yes If Modified Duty, Specify any Limitations / Restrictions        Do you know of any previous injury or disease contributing to this condition or occupational disease?  No   Date  4/8/2019 Print Doctor’s  "Name  Greta Begum certify the employer’s copy of this form was mailed on:   Address  [address] [place] [place] [address] [phone] Insurer’s Use Only   [facility]  [address]    Provider’s Tax ID Number  [id number] Telephone  Dept: [phone]    Doctor’s Signature  e-GRETA Dee M.D. Degree   M.D.    Original - TREATING PHYSICIAN OR CHIROPRACTOR   Pg 2-Insurer/TPA   Pg 3-Employer   Pg 4-Employee                                                                                                  Form C-4 (rev01/03)   BRIEF DESCRIPTION OF RIGHTS AND BENEFITS  (Pursuant to [place] 616[place])  Notice of Injury or Occupational Disease (Incident Report Form C-1): If an injury or occupational disease (OD) arises out of and in the course of employment, you must provide written notice to your employer as soon as practicable, but no later than 7 days after the accident or OD. Your employer shall maintain a sufficient supply of the required forms.  Claim for Compensation (Form C-4): If medical treatment is sought, the form C-4 is available at the place of initial treatment. A completed \"Claim for Compensation\" (Form C-4) must be filed within 90 days after an accident or OD. The treating physician or chiropractor must, within 3 working days after treatment, complete and mail to the employer, the employer's insurer and third-party , the Claim for Compensation.  Medical Treatment: If you require medical treatment for your on-the-job injury or OD, you may be required to select a physician or chiropractor from a list provided by your workers’ compensation insurer, if it has contracted with an Organization for Managed Care (MCO) or Preferred Provider Organization (PPO) or providers of health care. If your employer has not entered into a contract with an MCO or PPO, you may select a physician or chiropractor from the Panel of Physicians and Chiropractors. Any medical costs related to " your industrial injury or OD will be paid by your insurer.  Temporary Total Disability (TTD): If your doctor has certified that you are unable to work for a period of at least 5 consecutive days, or 5 cumulative days in a 20-day period, or places restrictions on you that your employer does not accommodate, you may be entitled to TTD compensation.  Temporary Partial Disability (TPD): If the wage you receive upon reemployment is less than the compensation for TTD to which you are entitled, the insurer may be required to pay you TPD compensation to make up the difference. TPD can only be paid for a maximum of 24 months.  Permanent Partial Disability (PPD): When your medical condition is stable and there is an indication of a PPD as a result of your injury or OD, within 30 days, your insurer must arrange for an evaluation by a rating physician or chiropractor to determine the degree of your PPD. The amount of your PPD award depends on the date of injury, the results of the PPD evaluation and your age and wage.  Permanent Total Disability (PTD): If you are medically certified by a treating physician or chiropractor as permanently and totally disabled and have been granted a PTD status by your insurer, you are entitled to receive monthly benefits not to exceed 66 2/3% of your average monthly wage. The amount of your PTD payments is subject to reduction if you previously received a PPD award.  Vocational Rehabilitation Services: You may be eligible for vocational rehabilitation services if you are unable to return to the job due to a permanent physical impairment or permanent restrictions as a result of your injury or occupational disease.  Transportation and Per Cookie Reimbursement: You may be eligible for travel expenses and per cookie associated with medical treatment.  Reopening: You may be able to reopen your claim if your condition worsens after claim closure.  Appeal Process: If you disagree with a written  determination issued by the insurer or the insurer does not respond to your request, you may appeal to the Department of Administration, , by following the instructions contained in your determination letter. You must appeal the determination within 70 days from the date of the determination letter at [address], [address], [place], [place] [address], or [address], [address], [place], [place] [address]. If you disagree with the  decision, you may appeal to the Department of Administration, . You must file your appeal within 30 days from the date of the  decision letter at [address], [address], [place], [place] [address], or [address], [address], [place], [place] [address]. If you disagree with a decision of an , you may file a petition for judicial review with the District Court. You must do so within 30 days of the Appeal Officer’s decision. You may be represented by an  at your own expense or you may contact the [facility] for possible representation.  [place]  for Injured Workers (NAIW): If you disagree with a  decision, you may request that NAIW represent you without charge at an  Hearing. For information regarding denial of benefits, you may contact the [facility] at: [address], [address], [place] [address], [phone], or [address], [place] [address], [phone]  To File a Complaint with the Division: If you wish to file a complaint with the  of the Division of Industrial Relations (DIR), please contact the Workers’ Compensation Section, [address], [address], [place], [place] [address], telephone ([phone]) [phone], or [address] [facility] [address] [place], [place] [address], telephone [phone]) [phone].  For assistance with Workers’ Compensation Issues: you may contact the Office of the  [facility] Consumer Health Assistance, [address] [place] [address], [address], [place] [address], Toll Free 1-378.533.2105, Web site: http://govcha.[facility].[place]., E-mail [email or web address].[email or web address].                                                                                                                                                                               __________________________________________________________________                                    04/08/2019            Employee Name / Signature                                                                                                                            Date                                       D-2 (rev. 10/07)

## 2019-04-08 NOTE — CONSULTS
Ortho:    Consult dictated. Patient has a left ankle trimalleolar fracture dislocation. I recommend ORIF. The patient understands the risks, benefits, and alternatives and wishes to proceed.

## 2019-04-08 NOTE — RESPIRATORY CARE
Conscious Sedation Respiratory Update           On standby for conscious sedation.      1058:  Co2 27,  Sat 100% on 2 lpm NC RR 14.  1100:  Co2 19, sat 98% on 4 lpm, RR 12  1106:  Co2 23, sat 98% on 2 lpm,  RR 18, patient now awake and answering questions.  Respirations even and unlabored.

## 2019-04-08 NOTE — ED NOTES
Conscious Sedation, Moderate Sedation:  Pt is alert and following directions through the procedure.   Room ready:  BVM, RT, ETC02, o2 Patent iv, fluids, and suction.     10:53:  Time out  VS  36.6, 61, 18, 116/94 and 98 % on 2 L.    10:55,  50 mg of prop given  10:58,  50 mg of prop repeated  11:00, 25 mg of prop repeated,  66, 16, 111/74, 100% on 2L ETC02  22  11:06,   Procedure completed L ankle reduced and splinted, pt araseli very well and was verbal w/ stimulation the entire sedation.  Vs  36.9, 66, 14, 119/93 and etco2 20.    11:16 pt is fully alert and awake on r/a.  36.6, 63, 16, 116/78, 98 %.

## 2019-04-08 NOTE — ED NOTES
PIV started and blood to the lab, medicated for pain and imaging called for procedure.NPO since 4/7/2019 2100. Coffee at 0630 today.

## 2019-04-08 NOTE — ANESTHESIA QCDR
2019 [facility] Clinical Data Registry (for Quality Improvement)     Postoperative nausea/vomiting risk protocol (Adult = 18 yrs and Pediatric 3-17 yrs)- (430 and 463)  General inhalation anesthetic (NOT TIVA) with PONV risk factors: Yes  Provision of anti-emetic therapy with at least 2 different classes of agents: Yes   Patient DID NOT receive anti-emetic therapy and reason is documented in Medical Record:  N/A    Multimodal Pain Management- (AQI59)  Patient undergoing Elective Surgery (i.e. Outpatient, or ASC, or Prescheduled Surgery prior to Hospital Admission): Yes  Use of Multimodal Pain Management, two or more drugs and/or interventions, NOT including systemic opioids: Yes   Exception: Documented allergy to multiple classes of analgesics:  N/A    PACU assessment of acute postoperative pain prior to Anesthesia Care End- Applies to Patients Age = 18- (ABG7)  Initial PACU pain score is which of the following: < 7/10  Patient unable to report pain score: N/A    Post-anesthetic transfer of care checklist/protocol to PACU/ICU- (426 and 427)  Upon conclusion of case, patient transferred to which of the following locations: PACU/Non-ICU  Use of transfer checklist/protocol:   Exclusion: Service Performed in Patient Hospital Room (and thus did not require transfer):     PACU Reintubation- (AQI31)  General anesthesia requiring endotracheal intubation (ETT) along with subsequent extubation in OR or PACU: Yes  Required reintubation in the PACU: No   Extubation was a planned trial documented in the medical record prior to removal of the original airway device:  N/A    Unplanned admission to ICU related to anesthesia service up through end of PACU care- (MD51)  Unplanned admission to ICU (not initially anticipated at anesthesia start time): No

## 2019-04-08 NOTE — ANESTHESIA POSTPROCEDURE EVALUATION
Patient: Annalise Woodard    Procedure Summary     Date:  04/08/19 Room / Location:   OR 04 / SURGERY [facility]    Anesthesia Start:  1449 Anesthesia Stop:  1559    Procedure:  ORIF, ANKLE (Left Ankle) Diagnosis:  (left ankle trimalleolar fracture dislocation)    Surgeon:  Gildardo Padorn M.D. Responsible Provider:  Leonid Esposito M.D.    Anesthesia Type:  general, peripheral nerve block ASA Status:  3          Final Anesthesia Type: general, peripheral nerve block  Last vitals  BP   Blood Pressure: 151/90, NIBP: 121/74    Temp   36.7 °C (98 °F)    Pulse   Pulse: 86   Resp   18    SpO2   99 %      Anesthesia Post Evaluation    Patient location during evaluation: PACU  Patient participation: complete - patient participated  Level of consciousness: awake and alert  Pain score: 2    Airway patency: patent  Anesthetic complications: no  Cardiovascular status: hemodynamically stable  Respiratory status: acceptable  Hydration status: euvolemic    PONV: none           Nurse Pain Score: 10 (NPRS)

## 2019-04-08 NOTE — LETTER
"  FORM C-4:  EMPLOYEE’S CLAIM FOR COMPENSATION/ REPORT OF INITIAL TREATMENT  EMPLOYEE’S CLAIM - PROVIDE ALL INFORMATION REQUESTED   First Name  Annalise Last Name  Vance Birthdate             Age  1975 43 y.o. Sex  female Claim Number   Home Employee Address  [address]   [facility]                                     Zip  [address] Height  1.702 m (5' 7\") Weight  104.3 kg (230 lb) [facility]     Mailing Employee Address                           [address]    [facility]               Zip  [address] Telephone  [phone] (home)  Primary Language Spoken  ENGLISH   Insurer   Third Party   [employer] Employee's Occupation (Job Title) When Injury or Occupational Disease Occurred     Employer's Name  [employer] Telephone  [phone]    Employer Address  [address] [facility] [address]] Zip  [address]   Date of Injury  4/8/2019       Hour of Injury  6:35 AM Date Employer Notified  4/8/2019 Last Day of Work after Injury or Occupational Disease  4/8/2019 Supervisor to Whom Injury Reported  Cristian HaroNiraj Sivakumar   Address or Location of Accident (if applicable)  [[address] [place]]   What were you doing at the time of accident? (if applicable)  walking down stairs    How did this injury or occupational disease occur? Be specific and answer in detail. Use additional sheet if necessary)  I was walking down the stairs and Slipped on graphite dust and fell and my left ankle snapped   If you believe that you have an occupational disease, when did you first have knowledge of the disability and it relationship to your employment?  n/a Witnesses to the Accident  n/a     Nature of Injury or Occupational Disease  Workers' Compensation  Part(s) of Body Injured or Affected  Ankle (L), N/A, N/A    I certify that the above is true and correct to the best of my knowledge and that " I have provided this information in order to obtain the benefits of [place] or [place]  I hereby authorize any physician, chiropractor, surgeon, practitioner, or other person, any hospital, including [facility] or [facility] hospital, any medical service organization, any insurance company, or other institution or organization to release to each other, any medical or other information, including benefits paid or payable, pertinent to this injury or disease, except information relative to diagnosis, treatment and/or counseling for AIDS, psychological conditions, alcohol or controlled substances, for which I must give specific authorization.  A Photostat of this authorization shall be as valid as the original.   Date  04/08/2019 [facility] Employee’s Signature   THIS REPORT MUST BE COMPLETED AND MAILED WITHIN 3 WORKING DAYS OF TREATMENT   Place  GEN SURGERY [facility]  Name of Facility   [facility]   Date  4/8/2019 Diagnosis  (S82.892A) Closed fracture of left ankle, initial encounter Is there evidence the injured employee was under the influence of alcohol and/or another controlled substance at the time of accident?   Hour  5:36 PM Description of Injury or Disease  Closed fracture of left ankle, initial encounter No   Treatment  Surgery  Have you advised the patient to remain off work five days or more?         Yes   X-Ray Findings  Positive   If Yes   From Date    To Date      From information given by the employee, together with medical evidence, can you directly connect this injury or occupational disease as job incurred?    If No, is the employee capable of: Full Duty  No Modified Duty      Is additional medical care by a physician indicated?  Yes If Modified Duty, Specify any Limitations / Restrictions        Do you know of any previous injury or disease contributing to this  "condition or occupational disease?  No   Date  4/8/2019 Print Doctor’s Name  Greta Begum certify the employer’s copy of this form was mailed on:   Address  [address] [place] [address] [phone] Insurer’s Use Only   [facility]  [address]    Provider’s Tax ID Number  [id number] Telephone  Dept: [phone]    Doctor’s Signature  e-GRETA Dee M.D. Degree   MD    Original - TREATING PHYSICIAN OR CHIROPRACTOR   Pg 2-Insurer/TPA   Pg 3-Employer   Pg 4-Employee                                                                                                  Form C-4 (rev01/03)     BRIEF DESCRIPTION OF RIGHTS AND BENEFITS  (Pursuant to [place] 616[place])    Notice of Injury or Occupational Disease (Incident Report Form C-1): If an injury or occupational disease (OD) arises out of and in the course of employment, you must provide written notice to your employer as soon as practicable, but no later than 7 days after the accident or OD. Your employer shall maintain a sufficient supply of the required forms.    Claim for Compensation (Form C-4): If medical treatment is sought, the form C-4 is available at the place of initial treatment. A completed \"Claim for Compensation\" (Form C-4) must be filed within 90 days after an accident or OD. The treating physician or chiropractor must, within 3 working days after treatment, complete and mail to the employer, the employer's insurer and third-party , the Claim for Compensation.    Medical Treatment: If you require medical treatment for your on-the-job injury or OD, you may be required to select a physician or chiropractor from a list provided by your workers’ compensation insurer, if it has contracted with an Organization for Managed Care (MCO) or Preferred Provider Organization (PPO) or providers of health care. If your employer has not entered into a contract with an MCO or PPO, you may select a physician or chiropractor " from the Panel of Physicians and Chiropractors. Any medical costs related to your industrial injury or OD will be paid by your insurer.    Temporary Total Disability (TTD): If your doctor has certified that you are unable to work for a period of at least 5 consecutive days, or 5 cumulative days in a 20-day period, or places restrictions on you that your employer does not accommodate, you may be entitled to TTD compensation.    Temporary Partial Disability (TPD): If the wage you receive upon reemployment is less than the compensation for TTD to which you are entitled, the insurer may be required to pay you TPD compensation to make up the difference. TPD can only be paid for a maximum of 24 months.    Permanent Partial Disability (PPD): When your medical condition is stable and there is an indication of a PPD as a result of your injury or OD, within 30 days, your insurer must arrange for an evaluation by a rating physician or chiropractor to determine the degree of your PPD. The amount of your PPD award depends on the date of injury, the results of the PPD evaluation and your age and wage.    Permanent Total Disability (PTD): If you are medically certified by a treating physician or chiropractor as permanently and totally disabled and have been granted a PTD status by your insurer, you are entitled to receive monthly benefits not to exceed 66 2/3% of your average monthly wage. The amount of your PTD payments is subject to reduction if you previously received a PPD award.    Vocational Rehabilitation Services: You may be eligible for vocational rehabilitation services if you are unable to return to the job due to a permanent physical impairment or permanent restrictions as a result of your injury or occupational disease.    Transportation and Per Cookie Reimbursement: You may be eligible for travel expenses and per cookie associated with medical treatment.  Reopening: You may be able to reopen your claim if your condition  worsens after claim closure.    Appeal Process: If you disagree with a written determination issued by the insurer or the insurer does not respond to your request, you may appeal to the Department of Administration, , by following the instructions contained in your determination letter. You must appeal the determination within 70 days from the date of the determination letter at [address], [address], [place], [place] [address], or [address], [place], [place] [address]. If you disagree with the  decision, you may appeal to the Department of Administration, . You must file your appeal within 30 days from the date of the  decision letter at [address], [address], [place], [place] [address], or [address], [place], [place] [address]. If you disagree with a decision of an , you may file a petition for judicial review with the District Court. You must do so within 30 days of the Appeal Officer’s decision. You may be represented by an  at your own expense or you may contact the [facility] for possible representation.    [place]  for Injured Workers (NAIW): If you disagree with a  decision, you may request that NAIW represent you without charge at an  Hearing. For information regarding denial of benefits, you may contact the [facility] at: [address], [address], [place] [address], [phone], or [address], [place] [address], [phone]    To File a Complaint with the Division: If you wish to file a complaint with the  of the Division of Industrial Relations (DIR), please contact the Workers’ Compensation Section, [address], [place], [place] [address], telephone [phone], or [address] [facility] [address] [facility] [address], [place] [address], telephone [phone]  [phone].    For assistance with Workers’ Compensation Issues: you may contact the Office of the Governor Consumer Health Assistance, [address] [place] [address], [address], [place] [address], Toll Free 1-461.228.2946, Web site: http://govcha.[email or web address].[place]., E-mail [email or web address].[place].                                                                                                                                                                               __________________________________________________________________                                    _________________            Employee Name / Signature                                                                                                                            Date                                       D-2 (rev. 10/07)

## 2019-04-08 NOTE — ANESTHESIA PROCEDURE NOTES
Airway  Date/Time: 4/8/2019 2:57 PM  Performed by: MARY ANN GHOSH  Authorized by: MARY ANN GHOSH     Location:  OR  Urgency:  Elective  Difficult Airway: No    Indications for Airway Management:  Anesthesia  Spontaneous Ventilation: absent    Sedation Level:  Deep  Preoxygenated: Yes    Patient Position:  Sniffing  MILS Maintained Throughout: Yes    Mask Difficulty Assessment:  0 - not attempted  Final Airway Type:  Supraglottic airway  Final Supraglottic Airway:  Standard LMA  SGA Size:  4  Airway Seal Pressure (cm H2O):  18  Number of Attempts at Approach:  1

## 2019-04-08 NOTE — ANESTHESIA PREPROCEDURE EVALUATION
Relevant Problems   No relevant active problems       Physical Exam    Airway   Mallampati: II  TM distance: >3 FB  Neck ROM: full       Cardiovascular - normal exam  Rhythm: regular  Rate: normal  (-) murmur     Dental - normal exam         Pulmonary - normal exam  Breath sounds clear to auscultation     Abdominal    Neurological - normal exam                 Anesthesia Plan    ASA 3   ASA physical status 3 criteria: respiratory insufficiency or compromise    Plan - general and peripheral nerve block     Peripheral nerve block will be post-op pain control  Airway plan will be LMA        Induction: intravenous    Postoperative Plan: Postoperative administration of opioids is intended.    Pertinent diagnostic labs and testing reviewed    Informed Consent:    Anesthetic plan and risks discussed with patient.    Use of blood products discussed with: patient whom consented to blood products.

## 2019-04-08 NOTE — ED TRIAGE NOTES
"Chief Complaint   Patient presents with   • T-5000 Ankle Injury     Left, fell down stairs while at work, pt unsure how many steps fell down     /106   Pulse 74   Temp 36.9 °C (98.5 °F) (Temporal)   Resp (!) 22   Ht 1.702 m (5' 7\")   Wt 104.3 kg (230 lb)   LMP 03/14/2019   SpO2 94%   BMI 36.02 kg/m²     Reviewed Triage Process w/ pt.  Encouarged to notify RN of any changes in condition or concerns.  Pt to lobby.    "

## 2019-04-08 NOTE — ED PROVIDER NOTES
"ED Provider Note    CHIEF COMPLAINT  Chief Complaint   Patient presents with   • T-5000 Ankle Injury     Left, fell down stairs while at work, pt unsure how many steps fell down       HPI  Annalise Woodard is a 43 y.o. female who presents with complaint of ankle pain.  She slipped down the steps this morning and could not get up she hurt her ankle her sister was there and said it actually moved abnormally.  She was brought in for evaluation she has no other injury she has no headache neck pain back pain chest pain or other extremity pain no numbness tingling or weakness all other systems are negative    REVIEW OF SYSTEMS  See HPI for further details    PAST MEDICAL HISTORY  Past Medical History:   Diagnosis Date   • ASTHMA    • Hypertension    • Lupus    • Rheumatoid arthritis (HCC)        FAMILY HISTORY  History reviewed. No pertinent family history.    SOCIAL HISTORY  Social History     Social History   • Marital status: Single     Spouse name: N/A   • Number of children: N/A   • Years of education: N/A     Social History Main Topics   • Smoking status: Never Smoker   • Smokeless tobacco: Not on file   • Alcohol use Yes   • Drug use: No   • Sexual activity: Not on file     Other Topics Concern   • Not on file     Social History Narrative   • No narrative on file       SURGICAL HISTORY  History reviewed. No pertinent surgical history.    CURRENT MEDICATIONS  Home Medications    **Home medications have not yet been reviewed for this encounter**         ALLERGIES  No Known Allergies    PHYSICAL EXAM  VITAL SIGNS: /106   Pulse 74   Temp 36.9 °C (98.5 °F) (Temporal)   Resp (!) 22   Ht 1.702 m (5' 7\")   Wt 104.3 kg (230 lb)   LMP 03/14/2019   SpO2 94%   BMI 36.02 kg/m²     Constitutional: Patient is alert and oriented x3 in moderate distress   HENT: Moist mucous membranes  Eyes:   No conjunctivitis or icterus  Neck: Nontender  Cardiovascular: Normal heart rate    Thorax & Lungs: Clear to " auscultation  Abdomen: Nontender  Neurologic: Normal motor sensation  Extremities: Patient has diffuse swelling bilateral malleoli with tenderness.  Good distal capillary refill  Psychiatric: Affect normal, Judgment normal, Mood normal.     Results for orders placed or performed during the hospital encounter of 04/08/19   CBC WITH DIFFERENTIAL   Result Value Ref Range    WBC 6.2 4.8 - 10.8 K/uL    RBC 4.54 4.20 - 5.40 M/uL    Hemoglobin 12.9 12.0 - 16.0 g/dL    Hematocrit 39.2 37.0 - 47.0 %    MCV 86.3 81.4 - 97.8 fL    MCH 28.4 27.0 - 33.0 pg    MCHC 32.9 (L) 33.6 - 35.0 g/dL    RDW 47.7 35.9 - 50.0 fL    Platelet Count 223 164 - 446 K/uL    MPV 9.9 9.0 - 12.9 fL    Neutrophils-Polys 75.90 (H) 44.00 - 72.00 %    Lymphocytes 15.70 (L) 22.00 - 41.00 %    Monocytes 6.80 0.00 - 13.40 %    Eosinophils 1.10 0.00 - 6.90 %    Basophils 0.20 0.00 - 1.80 %    Immature Granulocytes 0.30 0.00 - 0.90 %    Nucleated RBC 0.00 /100 WBC    Neutrophils (Absolute) 4.67 2.00 - 7.15 K/uL    Lymphs (Absolute) 0.97 (L) 1.00 - 4.80 K/uL    Monos (Absolute) 0.42 0.00 - 0.85 K/uL    Eos (Absolute) 0.07 0.00 - 0.51 K/uL    Baso (Absolute) 0.01 0.00 - 0.12 K/uL    Immature Granulocytes (abs) 0.02 0.00 - 0.11 K/uL    NRBC (Absolute) 0.00 K/uL   COMP METABOLIC PANEL   Result Value Ref Range    Sodium 135 135 - 145 mmol/L    Potassium 4.0 3.6 - 5.5 mmol/L    Chloride 104 96 - 112 mmol/L    Co2 24 20 - 33 mmol/L    Anion Gap 7.0 0.0 - 11.9    Glucose 89 65 - 99 mg/dL    Bun 16 8 - 22 mg/dL    Creatinine 0.78 0.50 - 1.40 mg/dL    Calcium 8.7 8.4 - 10.2 mg/dL    AST(SGOT) 22 12 - 45 U/L    ALT(SGPT) 12 2 - 50 U/L    Alkaline Phosphatase 52 30 - 99 U/L    Total Bilirubin 1.0 0.1 - 1.5 mg/dL    Albumin 3.8 3.2 - 4.9 g/dL    Total Protein 7.2 6.0 - 8.2 g/dL    Globulin 3.4 1.9 - 3.5 g/dL    A-G Ratio 1.1 g/dL   ESTIMATED GFR   Result Value Ref Range    GFR If African American >60 >60 mL/min/1.73 m 2    GFR If Non African American >60 >60 mL/min/1.73 m  2   HCG QUAL SERUM   Result Value Ref Range    Beta-Hcg Qualitative Serum Negative Negative      DX-ANKLE 2- VIEWS RIGHT   Final Result         Interval reduction of trimalleolar left ankle fracture.      DX-TIBIA AND FIBULA LEFT   Final Result      Fracture dislocation of the left ankle joint.      DX-ANKLE 3+ VIEWS LEFT   Final Result         Trimalleolar fracture with dislocation of the tibiotalar joint.      DX-ANKLE 3+ VIEWS LEFT    (Results Pending)   DX-PORTABLE FLUORO > 1 HOUR    (Results Pending)     Procedural sedation:    Patient was placed on oxygen and a tidal CO2.  She was consented.  Her airway is assessed she has no apparent complication for airway intervention.  She was then given propofol 50 mg then 50 more than 25 more procedure was done.  Patient had no complications.  Her postprocedural physical exam she has alert and oriented normal breath sounds normal neurologic examination normal oxygenation    Fracture dislocation reduction:    Patient was sedated with propofol.  I then used longitudinal traction on the foot and reduce the ankle joint easily.  Splint was placed under my direction and assistance.  It was a sugar tong with a posterior splint short leg both.  She had good capillary refill after the procedure      COURSE & MEDICAL DECISION MAKING  Pertinent Labs & Imaging studies reviewed. (See chart for details)  I discussed the case with Dr. Gomez of orthopedics the patient's n.p.o. since 6 this morning.  She is being admitted for surgery.  Of contacted the hospitalist as well patient's been admitted    FINAL IMPRESSION  1.   1. Closed fracture of left ankle, initial encounter    Fracture dislocation reduction of left ankle  Splint placement  Procedural sedation greater than 16 minutes      2.   3.         Electronically signed by: Praful Begum, 4/8/2019 9:26 AM

## 2019-04-09 ENCOUNTER — PATIENT OUTREACH (OUTPATIENT)
Dept: HEALTH INFORMATION MANAGEMENT | Facility: OTHER | Age: 44
End: 2019-04-09

## 2019-04-09 VITALS
WEIGHT: 230 LBS | HEART RATE: 57 BPM | SYSTOLIC BLOOD PRESSURE: 135 MMHG | BODY MASS INDEX: 36.1 KG/M2 | TEMPERATURE: 97.6 F | DIASTOLIC BLOOD PRESSURE: 87 MMHG | OXYGEN SATURATION: 100 % | HEIGHT: 67 IN | RESPIRATION RATE: 18 BRPM

## 2019-04-09 PROBLEM — S82.852A TRIMALLEOLAR FRACTURE OF ANKLE, CLOSED, LEFT, INITIAL ENCOUNTER: Status: RESOLVED | Noted: 2019-04-08 | Resolved: 2019-04-09

## 2019-04-09 LAB
ANION GAP SERPL CALC-SCNC: 6 MMOL/L (ref 0–11.9)
BUN SERPL-MCNC: 13 MG/DL (ref 8–22)
CALCIUM SERPL-MCNC: 7.9 MG/DL (ref 8.4–10.2)
CHLORIDE SERPL-SCNC: 107 MMOL/L (ref 96–112)
CO2 SERPL-SCNC: 20 MMOL/L (ref 20–33)
CREAT SERPL-MCNC: 0.75 MG/DL (ref 0.5–1.4)
ERYTHROCYTE [DISTWIDTH] IN BLOOD BY AUTOMATED COUNT: 46.2 FL (ref 35.9–50)
GLUCOSE SERPL-MCNC: 104 MG/DL (ref 65–99)
HCT VFR BLD AUTO: 40.1 % (ref 37–47)
HGB BLD-MCNC: 13.3 G/DL (ref 12–16)
MCH RBC QN AUTO: 28.1 PG (ref 27–33)
MCHC RBC AUTO-ENTMCNC: 33.2 G/DL (ref 33.6–35)
MCV RBC AUTO: 84.6 FL (ref 81.4–97.8)
PLATELET # BLD AUTO: 211 K/UL (ref 164–446)
PMV BLD AUTO: 10.4 FL (ref 9–12.9)
POTASSIUM SERPL-SCNC: 4.2 MMOL/L (ref 3.6–5.5)
RBC # BLD AUTO: 4.74 M/UL (ref 4.2–5.4)
SODIUM SERPL-SCNC: 133 MMOL/L (ref 135–145)
WBC # BLD AUTO: 6.6 K/UL (ref 4.8–10.8)

## 2019-04-09 PROCEDURE — G0378 HOSPITAL OBSERVATION PER HR: HCPCS

## 2019-04-09 PROCEDURE — A9270 NON-COVERED ITEM OR SERVICE: HCPCS | Performed by: HOSPITALIST

## 2019-04-09 PROCEDURE — 99217 PR OBSERVATION CARE DISCHARGE: CPT | Performed by: HOSPITALIST

## 2019-04-09 PROCEDURE — 97162 PT EVAL MOD COMPLEX 30 MIN: CPT

## 2019-04-09 PROCEDURE — 97165 OT EVAL LOW COMPLEX 30 MIN: CPT

## 2019-04-09 PROCEDURE — 700105 HCHG RX REV CODE 258: Performed by: HOSPITALIST

## 2019-04-09 PROCEDURE — 36415 COLL VENOUS BLD VENIPUNCTURE: CPT

## 2019-04-09 PROCEDURE — 96376 TX/PRO/DX INJ SAME DRUG ADON: CPT

## 2019-04-09 PROCEDURE — 700102 HCHG RX REV CODE 250 W/ 637 OVERRIDE(OP): Performed by: HOSPITALIST

## 2019-04-09 PROCEDURE — 700111 HCHG RX REV CODE 636 W/ 250 OVERRIDE (IP): Performed by: HOSPITALIST

## 2019-04-09 PROCEDURE — 80048 BASIC METABOLIC PNL TOTAL CA: CPT

## 2019-04-09 PROCEDURE — 97535 SELF CARE MNGMENT TRAINING: CPT

## 2019-04-09 PROCEDURE — 85027 COMPLETE CBC AUTOMATED: CPT

## 2019-04-09 RX ORDER — OXYCODONE HYDROCHLORIDE 5 MG/1
5 TABLET ORAL EVERY 4 HOURS PRN
Qty: 30 TAB | Refills: 0 | Status: SHIPPED | OUTPATIENT
Start: 2019-04-09 | End: 2019-04-16

## 2019-04-09 RX ORDER — ONDANSETRON 4 MG/1
4 TABLET, ORALLY DISINTEGRATING ORAL EVERY 4 HOURS PRN
Qty: 30 TAB | Refills: 0 | Status: SHIPPED | OUTPATIENT
Start: 2019-04-09 | End: 2019-04-16

## 2019-04-09 RX ADMIN — LISINOPRIL 10 MG: 5 TABLET ORAL at 05:27

## 2019-04-09 RX ADMIN — SODIUM CHLORIDE, POTASSIUM CHLORIDE, SODIUM LACTATE AND CALCIUM CHLORIDE: 600; 310; 30; 20 INJECTION, SOLUTION INTRAVENOUS at 05:31

## 2019-04-09 RX ADMIN — ONDANSETRON 4 MG: 4 TABLET, ORALLY DISINTEGRATING ORAL at 12:51

## 2019-04-09 RX ADMIN — ONDANSETRON 4 MG: 2 INJECTION INTRAMUSCULAR; INTRAVENOUS at 07:35

## 2019-04-09 RX ADMIN — OXYCODONE HYDROCHLORIDE 5 MG: 5 TABLET ORAL at 09:37

## 2019-04-09 RX ADMIN — OXYCODONE HYDROCHLORIDE 5 MG: 5 TABLET ORAL at 12:51

## 2019-04-09 RX ADMIN — MORPHINE SULFATE 4 MG: 4 INJECTION INTRAVENOUS at 03:22

## 2019-04-09 RX ADMIN — HYDROCHLOROTHIAZIDE 25 MG: 25 TABLET ORAL at 05:27

## 2019-04-09 RX ADMIN — STANDARDIZED SENNA CONCENTRATE AND DOCUSATE SODIUM 2 TABLET: 8.6; 5 TABLET, FILM COATED ORAL at 05:27

## 2019-04-09 RX ADMIN — OXYCODONE HYDROCHLORIDE 5 MG: 5 TABLET ORAL at 05:28

## 2019-04-09 RX ADMIN — FOLIC ACID 1 MG: 1 TABLET ORAL at 05:27

## 2019-04-09 ASSESSMENT — GAIT ASSESSMENTS
DISTANCE (FEET): 100
GAIT LEVEL OF ASSIST: CONTACT GUARD ASSIST
ASSISTIVE DEVICE: FRONT WHEEL WALKER

## 2019-04-09 ASSESSMENT — PATIENT HEALTH QUESTIONNAIRE - PHQ9
SUM OF ALL RESPONSES TO PHQ9 QUESTIONS 1 AND 2: 0
2. FEELING DOWN, DEPRESSED, IRRITABLE, OR HOPELESS: NOT AT ALL
1. LITTLE INTEREST OR PLEASURE IN DOING THINGS: NOT AT ALL

## 2019-04-09 ASSESSMENT — ACTIVITIES OF DAILY LIVING (ADL): TOILETING: INDEPENDENT

## 2019-04-09 NOTE — DISCHARGE SUMMARY
Discharge Summary    CHIEF COMPLAINT ON ADMISSION  Chief Complaint   Patient presents with   • T-5000 Ankle Injury     Left, fell down stairs while at work, pt unsure how many steps fell down       Reason for Admission  Left ankle Pain     Admission Date  4/8/2019    CODE STATUS  Full Code    HPI & HOSPITAL COURSE  This is a 43 y.o. female here admitted on 4/8/2019 for a Tri-bimalleolar fracture of her left ankle after patient sustained a mechanical ground-level fall.  As a result on admission, orthopedic surgery was consulted, she underwent an ORIF of her left ankle.  Postoperatively pain management was provided, in addition to physical and occupational therapy evaluation whom they recommended utilization of a front wheel walker during ambulation.  Otherwise patient denies have any chest pain shortness of breath or nausea vomiting, her pain is well controlled.  We have ordered a front wheel walker for her.  I have offered her home health the patient has declined.  At this point patient will be not weight bearing status until she follows up with the [place] orthopedic clinic.       Therefore, she is discharged in good and stable condition to home with close outpatient follow-up.    The patient met 2-midnight criteria for an inpatient stay at the time of discharge.     Discharge Date  4/9/2019    FOLLOW UP ITEMS POST DISCHARGE  Orthopaedics Gildardo Padron M.D. 2-3 weeks    DISCHARGE DIAGNOSES  Active Problems:    Other forms of systemic lupus erythematosus (HCC) POA: Yes    Mild intermittent asthma without complication POA: Yes    Essential hypertension POA: Yes  Resolved Problems:    Trimalleolar fracture of ankle, closed, left, initial encounter POA: Yes      FOLLOW UP  No future appointments.  Gildardo Padron M.D.  [address]  [facility] [address]  [phone]      JADON [place]  [facility] [address]  [phone]      Primary Care Provider        MEDICATIONS ON DISCHARGE     Medication List      START taking these medications      Instructions   ondansetron 4 MG Tbdp  Commonly known as:  ZOFRAN ODT   Take 1 Tab by mouth every four hours as needed for Nausea (give PO if no IV route available) for up to 7 days.  Dose:  4 mg     oxyCODONE immediate-release 5 MG Tabs  Commonly known as:  ROXICODONE   Take 1 Tab by mouth every four hours as needed for Severe Pain for up to 7 days.  Dose:  5 mg        CONTINUE taking these medications      Instructions   ALEVE 220 MG tablet  Generic drug:  naproxen   Take 440 mg by mouth as needed.  Dose:  440 mg     folic acid 1 MG Tabs  Commonly known as:  FOLVITE   Take 1 mg by mouth every day.  Dose:  1 mg     hydroCHLOROthiazide 25 MG Tabs  Commonly known as:  HYDRODIURIL   Take 25 mg by mouth every day.  Dose:  25 mg     lisinopril-hydrochlorothiazide 10-12.5 MG per tablet  Commonly known as:  PRINZIDE, ZESTORETIC   Take 1 Tab by mouth every day.  Dose:  1 Tab     methotrexate 2.5 MG Tabs   Take 10 mg by mouth every 7 days. On Monday  Dose:  10 mg     VITAMIN D3 PO   Take 2 Caps by mouth every day.  Dose:  2 Cap            Allergies  No Known Allergies    DIET  Orders Placed This Encounter   Procedures   • Diet Order Regular     Standing Status:   Standing     Number of Occurrences:   1     Order Specific Question:   Diet:     Answer:   Regular [1]       ACTIVITY  As tolerated.  Non-weight bearing LEFT leg    CONSULTATIONS  Ortho Gildardo Padron M.D.    PROCEDURES  ORIF of left ankle trimalleolar fracture dislocation    LABORATORY  Lab Results   Component Value Date    SODIUM 133 (L) 04/09/2019    POTASSIUM 4.2 04/09/2019    CHLORIDE 107 04/09/2019    CO2 20 04/09/2019    GLUCOSE 104 (H) 04/09/2019    BUN 13 04/09/2019    CREATININE 0.75 04/09/2019        Lab Results   Component Value Date    WBC 6.6 04/09/2019    HEMOGLOBIN 13.3 04/09/2019    HEMATOCRIT 40.1 04/09/2019    PLATELETCT 211 04/09/2019        Total  time of the discharge process exceeds 35 minutes.

## 2019-04-09 NOTE — PROGRESS NOTES
Received report from day shift nurse.   Assumed pt care   Pt is A&Ox4, resting comfortably in bed.   Pt on r.a.. No signs of SOB/respiratory distress.   Labs noted, VSS.   Pt c/o to surgical site/ L ankle; prn Morphine 4mg given per MAR  Assessment completed, LLE in cast with surgical dressing in place; pt able to wiggle toes, + CMS, c/o numbness/tingling   LLE being elevated on pillows   Fall precautions in place.   Call light and personal belongings within reach.   Continue to monitor

## 2019-04-09 NOTE — DISCHARGE INSTRUCTIONS
Keep splint on, clean, and dry until clinic follow-up. Elevate above heart and ice frequently for at least 2 weeks.    Non-weight bearing x 8 weeks.     Discharge Instructions    Discharged to home by car with relative. Discharged via wheelchair, hospital escort: Yes.  Special equipment needed: Not Applicable    Be sure to schedule a follow-up appointment with your primary care doctor or any specialists as instructed.     Discharge Plan:   Influenza Vaccine Indication: Not indicated: Previously immunized this influenza season and > 8 years of age    I understand that a diet low in cholesterol, fat, and sodium is recommended for good health. Unless I have been given specific instructions below for another diet, I accept this instruction as my diet prescription.   Other diet: Regular    Special Instructions: Discharge instructions for the Orthopedic Patient    Follow up with Primary Care Physician within 2 weeks of discharge to home, regarding:  Review of medications and diagnostic testing.  Surveillance for medical complications.  Workup and treatment of osteoporosis, if appropriate.     -Is this a Joint Replacement patient? No    -Is this patient being discharged with medication to prevent blood clots?  No    · Is patient discharged on Warfarin / Coumadin?   No     Depression / Suicide Risk    As you are discharged from this [facility] facility, it is important to learn how to keep safe from harming yourself.    Recognize the warning signs:  · Abrupt changes in personality, positive or negative- including increase in energy   · Giving away possessions  · Change in eating patterns- significant weight changes-  positive or negative  · Change in sleeping patterns- unable to sleep or sleeping all the time   · Unwillingness or inability to communicate  · Depression  · Unusual sadness, discouragement and loneliness  · Talk of wanting to die  · Neglect of personal appearance   · Rebelliousness- reckless  behavior  · Withdrawal from people/activities they love  · Confusion- inability to concentrate     If you or a loved one observes any of these behaviors or has concerns about self-harm, here's what you can do:  · Talk about it- your feelings and reasons for harming yourself  · Remove any means that you might use to hurt yourself (examples: pills, rope, extension cords, firearm)  · Get professional help from the community (Mental Health, Substance Abuse, psychological counseling)  · Do not be alone:Call your Safe Contact- someone whom you trust who will be there for you.  · Call your local CRISIS HOTLINE 057-9646 or 930-256-1904  · Call your local Children's Mobile Crisis Response Team [place] (599) 808-0057 or www.skyrockit  · Call the toll free National Suicide Prevention Hotlines   · National Suicide Prevention Lifeline 587-198-XEIE (4706)  · National Hope Line Network 800-SUICIDE (245-2683)

## 2019-04-09 NOTE — PROGRESS NOTES
Received report from NOC RN; assumed pt care. Pt A&Ox4, sitting up in bed. Pt denies pain at this moment, numbness/tingling noted to LLE. Pt able to wiggle toes. Soft cast to LLE. Nausea noted, medicated per MAR. Pt notified to call for assistance.

## 2019-04-09 NOTE — PROGRESS NOTES
Report received from PACU. Pt to floor. Pain 7/10 to left leg, will medicate per MAR.  Denies nausea at this time.  IV fluids restarted.  CMS intact.  Pt able to wiggle toes, +2 pulses to LLE.  Discussed plan of care with patient.

## 2019-04-09 NOTE — PROGRESS NOTES
Discharging pt home per MD order. Discussed discharge instructions, follow up appointments, prescriptions, and home care for ORIF LT ankle. Pt voiding and ambulating without difficulty, pain controlled, tolerating diet. Family at bedside, all questions answered. Pt discharged off unit with hospital escort at 1312.

## 2019-04-09 NOTE — CARE PLAN
Problem: Safety  Goal: Will remain free from injury  Outcome: PROGRESSING AS EXPECTED  Bed in low and locked position.  Call light with in reach.      Problem: Pain Management  Goal: Pain level will decrease to patient's comfort goal  Outcome: PROGRESSING AS EXPECTED

## 2019-04-09 NOTE — DISCHARGE PLANNING
Care Transition Team Assessment    SW met with this patient bedside to complete assessment.  Patient lives at home with her father (perfecto she cares for) and her 15 yr old son.  Patient stated that she also has an adult DTR in town that can assist her.  SW noted that there is orders for HH and FWW for patient's d/c.  SW attempted to call patient's workman's comp company and was informed that they did not have any information on this patient yet.  SW was asked to leave a message for a supervisor.     Information Source  Orientation : Oriented x 4  Information Given By: Patient  Informant's Name: Annalise  Who is responsible for making decisions for patient? : Patient    Readmission Evaluation  Is this a readmission?: No    Elopement Risk  Legal Hold: No  Ambulatory or Self Mobile in Wheelchair: Yes  Disoriented: No  Psychiatric Symptoms: None  History of Wandering: No  Elopement this Admit: No  Vocalizing Wanting to Leave: No  Displays Behaviors, Body Language Wanting to Leave: No-Not at Risk for Elopement  Elopement Risk: Not at Risk for Elopement    Interdisciplinary Discharge Planning  Does Admitting Nurse Feel This Could be a Complex Discharge?: No  Primary Care Physician: Dr. Milagros Chanel  Lives with - Patient's Self Care Capacity: Alone and Able to Care For Self, Adult Children, Child Less than 18 Years of Age, Parents  Patient or legal guardian wants to designate a caregiver (see row info): No  Support Systems: Family Member(s)  Housing / Facility: 3 Story Apartment / Condo  Do You Take your Prescribed Medications Regularly: Yes  Able to Return to Previous ADL's: Yes  Mobility Issues: Yes  Prior Services: None  Assistance Needed: Unknown at this Time    Discharge Preparedness  What is your plan after discharge?: Home with help  What are your discharge supports?: Child, Parent  Prior Functional Level: Independent with Activities of Daily Living    Functional Assesment  Prior Functional Level: Independent with  Activities of Daily Living    Finances  Financial Barriers to Discharge: No  Prescription Coverage: Yes    Vision / Hearing Impairment  Right Eye Vision: Impaired, Wears Glasses  Left Eye Vision: Impaired, Wears Glasses         Advance Directive  Advance Directive?: None    Domestic Abuse  Have you ever been the victim of abuse or violence?: No    Psychological Assessment  History of Substance Abuse: None  History of Psychiatric Problems: No    Discharge Risks or Barriers  Discharge risks or barriers?: No    Anticipated Discharge Information  Anticipated discharge disposition: Home

## 2019-04-09 NOTE — THERAPY
"Occupational Therapy Evaluation completed.   Functional Status: Admitted following fall at wor; s/p ORIF L ankle. NWB LLE. A&Ox4. Performs bed mobilitywith Mod Indep. ADL transfers with CGA, FWW. Toileting earlier with SBA. Seated grooming with Sup. Does not tolerate >5\" of standing due to pain. LB dressing with SBA/CGA. Reviewed needed DME for tub,toilet at home, home safety during ADL's. Reviewed how to keep LLE dressing dry while in shower. Demo'd safe tub transfer while maintaining NWB LLE. Daughter present for caregiver training.       Plan of Care: Patient with no further skilled OT needs in the acute care setting at this time  Discharge Recommendations:  Equipment: Crutches, FWW delivered to room. Daughter to obtain Tub Transfer Bench and Raised Toilet Seat.  Currently anticipate no further skilled therapy needs once patient is discharged from the inpatient setting.   See \"Rehab Therapy-Acute\" Patient Summary Report for complete documentation.    "

## 2019-04-09 NOTE — FACE TO FACE
Face to Face Note  -  Durable Medical Equipment    Shiva Haines D.O. - NPI: [id number]  I certify that this patient is under my care and that they have had a durable medical equipment(DME)face to face encounter by myself that meets the physician DME face-to-face encounter requirements with this patient on:    Date of encounter:   Patient:                    MRN:                       YOB: 2017  Annalise Woodard  [id number]  1975     The encounter with the patient was in whole, or in part, for the following medical condition, which is the primary reason for durable medical equipment:  Other - ankle fracture/dislocation    I certify that, based on my findings, the following durable medical equipment is medically necessary:  Walkers.    HOME O2 Saturation Measurements:(Values must be present for Home Oxygen orders)         ,     ,         My Clinical findings support the need for the above equipment due to:  Abnormal Gait    Supporting Symptoms: pt is non weight bearing     ------------------------------------------------------------------------------------------------------------------    Face to Face Supporting Documentation - Home Health    The encounter with this patient was in whole or in part the primary reason for home health admission.    Date of encounter:   Patient:                    MRN:                       YOB: 2017  Annalise Woodard  [id number]  1975     Home health to see patient for:  Physical Therapy evaluation and treatment and Occupational therapy evaluation and treatment    Skilled need for:  Surgical Aftercare ankle ORIF    Skilled nursing interventions to include:  Comment: home safet evaluation    Homebound evidenced status by:  Need the aid of supportive devices such as crutches, canes, wheelchairs or walkers. Leaving home must require a considerable and taxing effort. There must exist a normal inability to leave the home.    Community  Physician to provide follow up care: JADON Carballo     Optional Interventions    Wound information & treatment:    Home Infusion Therapy orders:    Line/Drain/Airway:    I certify the face to face encounter for this home care referral meets the CMS requirements and the encounter/clinical assessment with the patient was, in whole, or in part, for the medical condition(s) listed above, which is the primary reason for home health care. Based on my clinical findings: the service(s) are medically necessary, support the need for home health care, and the homebound criteria are met.  I certify that this patient has had a face to face encounter by myself.  Shiva Haines D.O. - NPI: [id number]    *Debility, frailty and advanced age in the absence of an acute deterioration or exacerbation of a condition do not qualify a patient for home health.

## 2019-04-09 NOTE — DISCHARGE PLANNING
Received Choice form at 4793  Agency/Facility Name: [facility]  Referral sent per Choice form @ 1537

## 2019-04-09 NOTE — PROGRESS NOTES
Pt takes methotrexate 10mg every 7 days for her lupus. No orders on MAR  Paged Dr Quintana for order and awaiting a return call     0730: Dr. Quintana returned call and will put in order for pt

## 2019-04-09 NOTE — THERAPY
"Physical Therapy Evaluation completed.   Bed Mobility:  Supine to Sit: Modified Independent  Transfers: Sit to Stand: Stand by Assist  Gait: Level Of Assist: Contact Guard Assist with Front-Wheel Walker  X 100 feet and 3 stairs     Plan of Care: Patient with no further skilled PT needs in the acute care setting at this time  Discharge Recommendations: Equipment: Front-Wheel Walker and Crutches. Post-acute therapy Currently anticipate no further skilled therapy needs once patient is discharged from the inpatient setting.  43 year old female S/P ORIF L ankle.Pt lives at home with daughter and is active.Pt was safe with bed mob,transfers,ambulation and stairs,She understands precautions and will need FWW for ambulation and Crutches for stairs  See \"Rehab Therapy-Acute\" Patient Summary Report for complete documentation.     "

## 2019-04-09 NOTE — CARE PLAN
Problem: Knowledge Deficit  Goal: Knowledge of disease process/condition, treatment plan, diagnostic tests, and medications will improve  Outcome: PROGRESSING AS EXPECTED      Problem: Discharge Barriers/Planning  Goal: Patient's continuum of care needs will be met  Outcome: PROGRESSING AS EXPECTED  Pt required to be NWB to LLE for x8 weeks  Pt planning to discharge home with walker and wheelchair    Problem: Pain Management  Goal: Pain level will decrease to patient's comfort goal  Outcome: PROGRESSING AS EXPECTED   04/08/19 2209   OTHER   Pain Rating Scale (NPRS) 7   Comfort Goal Comfort at Rest;Sleep Comfortably   Prn Morphine 4mg given for c/o pain to surgical site/ L ankle; ice pack in place   Will continue to monitor pt for pain and encouraged pt to report to the nurse if pain remains unresolved

## 2019-04-09 NOTE — H&P
Hospital Medicine History & Physical Note    Date of Service  4/8/2019    Primary Care Physician  LAYA Carballo.    Consultants  Orthopedic surgery Dr. Padron    Code Status  Full    Chief Complaint  Ankle pain    History of Presenting Illness  43 y.o. female who presented 4/8/2019 with a history of lupus and rheumatoid arthritis maintained on methotrexate who has not been on prednisone in the past.  She reports that earlier today she was at work going down some stairs.  She states that oftentimes there is graphite on the floor and can be slippery.  She unfortunately slipped on the stairs, having trauma to her ankle and unable to ambulate afterwards.    In the ED the patient had imaging studies done which demonstrated a trimalleolar fracture dislocation of her left ankle.  This was reduced in the ED, and she has subsequently gone to the OR with Dr. Padron and had an open reduction internal fixation procedure completed.    Review of Systems  Review of Systems   Constitutional: Negative for chills and fever.   Respiratory: Negative for cough and shortness of breath.    Cardiovascular: Negative for chest pain.   Gastrointestinal: Negative for abdominal pain, diarrhea, nausea and vomiting.   Musculoskeletal: Negative for back pain.        L ankle pain   Skin: Negative for rash.   Neurological: Negative for dizziness, loss of consciousness and headaches.       Past Medical History   has a past medical history of ASTHMA; Hypertension; Lupus; and Rheumatoid arthritis (HCC).    Surgical History   has no past surgical history on file.     Family History  Reviewed but noncontributory    Social History   reports that she has never smoked. She does not have any smokeless tobacco history on file. She reports that she drinks alcohol. She reports that she does not use drugs.    Allergies  No Known Allergies    Medications  Prior to Admission Medications   Prescriptions Last Dose Informant Patient Reported?  Taking?   Cholecalciferol (VITAMIN D3 PO) 4/7/2019 at 1200 Patient Yes Yes   Sig: Take 2 Caps by mouth every day.   folic acid (FOLVITE) 1 MG Tab 4/7/2019 at 1200 Patient Yes Yes   Sig: Take 1 mg by mouth every day.   hydroCHLOROthiazide (HYDRODIURIL) 25 MG Tab 4/7/2019 at 1200 Patient Yes Yes   Sig: Take 25 mg by mouth every day.   lisinopril-hydrochlorothiazide (PRINZIDE, ZESTORETIC) 10-12.5 MG per tablet 4/7/2019 at 1200 Patient Yes Yes   Sig: Take 1 Tab by mouth every day.   methotrexate 2.5 MG Tab 4/1/2019 at AM Patient Yes Yes   Sig: Take 10 mg by mouth every 7 days. On Monday   naproxen (ALEVE) 220 MG tablet 4/7/2019 at 2230 Patient Yes Yes   Sig: Take 440 mg by mouth as needed.      Facility-Administered Medications: None       Physical Exam  Temp:  [36.3 °C (97.4 °F)-36.9 °C (98.5 °F)] 36.3 °C (97.4 °F)  Pulse:  [71-86] 73  Resp:  [12-22] 16  BP: (142-152)/() 152/96  SpO2:  [94 %-100 %] 100 %    Physical Exam   Constitutional: She is oriented to person, place, and time. She appears well-developed and well-nourished. No distress.   HENT:   Head: Normocephalic and atraumatic.   Neck: No JVD present.   Cardiovascular: Normal rate and regular rhythm.    Pulmonary/Chest: Effort normal. No stridor. No respiratory distress. She has no wheezes. She has no rales.   Abdominal: Soft. There is no tenderness. There is no rebound and no guarding.   Musculoskeletal: She exhibits no edema.   Pt in post op splint   Neurological: She is oriented to person, place, and time.   Skin: Skin is warm and dry. No rash noted. She is not diaphoretic.   Psychiatric: She has a normal mood and affect. Thought content normal.   Nursing note and vitals reviewed.      Laboratory:  Recent Labs      04/08/19   0940   WBC  6.2   RBC  4.54   HEMOGLOBIN  12.9   HEMATOCRIT  39.2   MCV  86.3   MCH  28.4   MCHC  32.9*   RDW  47.7   PLATELETCT  223   MPV  9.9     Recent Labs      04/08/19   0940   SODIUM  135   POTASSIUM  4.0   CHLORIDE  104    CO2  24   GLUCOSE  89   BUN  16   CREATININE  0.78   CALCIUM  8.7     Recent Labs      04/08/19   0940   ALTSGPT  12   ASTSGOT  22   ALKPHOSPHAT  52   TBILIRUBIN  1.0   GLUCOSE  89                 No results for input(s): TROPONINI in the last 72 hours.    Urinalysis:    No results found     Imaging:  DX-ANKLE 3+ VIEWS LEFT   Final Result      Intraoperative fluoroscopic spot images as described above.      DX-PORTABLE FLUORO > 1 HOUR   Final Result         Portable fluoroscopy utilized for 35.1 seconds.         DX-ANKLE 2- VIEWS RIGHT   Final Result         Interval reduction of trimalleolar left ankle fracture.      DX-TIBIA AND FIBULA LEFT   Final Result      Fracture dislocation of the left ankle joint.      DX-ANKLE 3+ VIEWS LEFT   Final Result         Trimalleolar fracture with dislocation of the tibiotalar joint.            Assessment/Plan:  I anticipate this patient is appropriate for observation status at this time.    Essential hypertension   Assessment & Plan    Continue lisinopril and hydrochlorothiazide.  Monitor and titrate as needed.     Mild intermittent asthma without complication   Assessment & Plan    Placed on O2 and respiratory protocols  Lung exam tonight is benign     Trimalleolar fracture of ankle, closed, left, initial encounter   Assessment & Plan    Status post ORIF.  Nonweightbearing  Consult PT OT  Placed DME order for front wheeled walker  Continue as needed support     Other forms of systemic lupus erythematosus (HCC)   Assessment & Plan    Patient is maintained on methotrexate.  She reports she has never been on any steroids.         VTE prophylaxis: none as pt is acutely post op

## 2019-04-09 NOTE — FACE TO FACE
Face to Face Supporting Documentation - Home Health    The encounter with this patient was in whole or in part the primary reason for home health admission.    Date of encounter:   Patient:                    MRN:                       YOB: 2017  Annalise Woodard  [id number]  1975     Home health to see patient for:  Skilled Nursing care for assessment, interventions & education and Physical Therapy evaluation and treatment    Skilled need for:  Surgical Aftercare ankle fracture    Skilled nursing interventions to include:  Comment: na    Homebound status evidenced by:  Need the aid of supportive devices such as crutches, canes, wheelchairs or walkers or Needs the assistance of another person in order to leave the home. Leaving home requires a considerable and taxing effort. There is a normal inability to leave the home.    Community Physician to provide follow up care: JADON Carballo     Optional Interventions? No      I certify the face to face encounter for this home health care referral meets the CMS requirements and the encounter/clinical assessment with the patient was, in whole, or in part, for the medical condition(s) listed above, which is the primary reason for home health care. Based on my clinical findings: the service(s) are medically necessary, support the need for home health care, and the homebound criteria are met.  I certify that this patient has had a face to face encounter by myself.    Jono Soliz M.D. - FABIOI: [id number]

## 2019-04-10 NOTE — OP REPORT
DATE OF SERVICE:  04/08/2019    SURGEON:  Gildardo Padron MD    ASSISTANT:  None.    PREOPERATIVE DIAGNOSIS:  Left trimalleolar ankle fracture dislocation with   syndesmotic injury.    POSTOPERATIVE DIAGNOSIS:  Left trimalleolar ankle fracture dislocation with   syndesmotic injury.    PROCEDURES:  1.  Open reduction and internal fixation, left trimalleolar ankle fracture   (without fixation of posterior malleolus).  2.  Open reduction and internal fixation, left syndesmotic injury.    ANESTHESIOLOGIST:  Leonid Esposito MD    ANESTHESIA:  General with lower extremity nerve block for pain control.    COMPLICATIONS:  None noted.    DRAINS:  None.    SPECIMENS:  None.    ESTIMATED BLOOD LOSS:  Minimal.    TOURNIQUET TIME:  Less than 45 minutes at 250 mmHg.    INDICATIONS:  The patient is a 43-year-old female well known to me after she   presented to the emergency department while I was on-call with the   above-mentioned injury.  We discussed conservative versus operative treatment.    After discussing advantages and disadvantages of each, she elected to   proceed with the above-mentioned procedure, which was my recommendation.    Prior to the procedure, she understood the risks, benefits, and alternatives   to surgery.  She understood the risks to include, but not be limited to the   risk of infection requiring repeat surgery, bleeding requiring blood   transfusion; nerve, blood vessel, tendon injury requiring repair; chronic   pain, nonunion, malunion, hardware failure, posttraumatic arthritis requiring   revision or salvage procedure, DVT, pulmonary embolism, heart attack, stroke,   and even death.  The patient states despite these risks, she wished to proceed   with surgery.    DESCRIPTION OF PROCEDURE:  The patient was met in the preoperative holding   area.  Her left ankle was initialed as correct operative site.  She had an   opportunity to ask questions, all questions were answered and informed consent    was obtained.    The patient was brought to the operating room and laid supine on operating   table.  All bony and dependent prominences were well padded.  Lower extremity   nerve block and general anesthesia were induced without complication.  Ancef   was administered for infection prophylaxis.  The left lower extremity was   prepped and draped in usual sterile fashion.  A formal time-out was performed,   in which all parties agreed upon the correct patient, procedure, and   operative site.  I began the procedure by using Esmarch to exsanguinate the   extremity and inflated the tourniquet to 250 mmHg.  I then made a longitudinal   incision over the lateral aspect of the ankle, dissected down to subcutaneous   tissues.  I identified and protected the superficial branch of the peroneal   nerve.  I used a scalpel and periosteal elevator to debride periosteum and   muscle from the oblique fibula fracture.  I then used a bone reduction clamp   to reduce the fracture and then neutralized it with one precontoured lateral   fibular locking plate from [employer] stainless steel with multiple   locking and nonlocking screws, which had excellent fixation, confirmed under   fluoroscopy anatomic reduction.  I then turned my attention to the medial   side, made a longitudinal incision over the medial malleolus, dissected down   to subcutaneous tissues.  I identified and protected the saphenous nerve.  I   used a scalpel and a periosteal elevator to debride periosteum and ligament   from the fracture site.  I then inspected the ankle joint through the   fracture.  I noticed there was approximately 5x5 mm osteochondral defect on   the talar dome with small free floating cartilaginous fragments in the joint.    I copiously irrigated the joint, lavaged all loose debris within the joint.    I then reduced the fracture with a dental pick and secured it with 2 K-wires.    Over the K-wires, I placed 2 [employer] partially  threaded cancellous   compression screws, which had excellent fixation.  I then confirmed under   fluoroscopy multiple views near anatomic reduction.  However, with a bone   reduction clamp, I was able still to displace the syndesmosis with the ankle   in neutral dorsiflexion.  I then placed one 3.5 mm quadricortical syndesmotic   screw reducing the syndesmosis on multiple views.  On the lateral view, I   visualized the posterior malleolus fragment.  It was less than or equal to 20%   with less than or equal to 2 mm of step-off.  I then deemed acceptable   alignment.  I copiously irrigated the wound with normal saline, deflated the   tourniquet, used Bovie cautery to achieve hemostasis, and closed the incision   with 2-0 Monocryl suture, skin stapler, all covered with Xeroform, 4x4s, and a   well-padded splint.  The patient awoke from anesthesia without known   complication and was transferred in stable condition to PACU where there were   no immediate post-term complaints.    POSTOPERATIVE PLAN:  The patient will be discharged home per same day   criteria, nonweightbearing for 2 months left lower extremity, and follow up in   clinic in 10-14 days for staple removal and wound check.       ____________________________________     MD SIDNEY Silva / JOSE    DD:  04/10/2019 08:24:31  DT:  04/10/2019 09:22:09    D#:  [id number]  Job#:  [id number]

## 2019-04-10 NOTE — CONSULTS
DATE OF SERVICE:  04/08/2019    ORTHOPEDIC CONSULTATION    CHIEF COMPLAINT:  Left ankle pain.    HISTORY OF PRESENT ILLNESS:  The patient is a 43-year-old female who works at   [employer].  She slipped on graphite dust on stairs, rolling her left   ankle with immediate severe pain and deformity and inability to bear weight.    She was brought to [facility], found to have a left ankle   trimalleolar fracture dislocation.  I was consulted as the orthopedic surgeon   on-call.  Upon seeing the patient, she had undergone reduction and splinting   by the emergency department with some relief for pain, which is approximately   8/10 in severity, somewhat amenable to splinting and bed rest and pain   medication.  She denies any numbness about the left foot.  She denies pain   elsewhere in her extremities.    PAST MEDICAL HISTORY:  Lupus, rheumatoid arthritis, asthma, hypertension.    PAST SURGICAL HISTORY:  None.    MEDICATIONS:  Methotrexate, prednisone.    ALLERGIES:  She has no known drug allergies.    FAMILY HISTORY:  Negative for bleeding disorders or anesthetic reactions.    SOCIAL HISTORY:  She denies tobacco, alcohol or drug use.    REVIEW OF SYSTEMS:  Thorough review of systems is performed and is negative   except for past medical history and history of present illness.    PHYSICAL EXAMINATION:  GENERAL:  She is alert and oriented x4, appropriate and cooperative with the   exam.  HEENT:  Normocephalic, atraumatic.  NEUROLOGIC:  Cranial nerves II-XII are grossly intact.  CARDIOVASCULAR:  2+ distal pulses.  EXTREMITIES:  No cyanosis, clubbing, or edema.  PULMONARY:  Breathing comfortably on room air without labor.  ABDOMEN:  Slightly obese, otherwise unremarkable.  SKIN:  No rashes, jaundice, or cyanosis.  SPINE:  Clinically midline.  GAIT:  Currently nonambulatory in a hospital bed.  MUSCULOSKELETAL:  Bilateral upper extremities, right lower extremity, no   tenderness to palpation, pain  with range of motion.  Left lower extremity, she   is in a well-padded splint.  She has no pain with passive stretch of her   great toe.  She has no tenderness to palpation about her knee.  No pain in her   hip with log roll.  No lacerations about the ankle.  No fracture blisters.  NEUROLOGIC:  Left lower extremity, sensation intact to light touch, L4, L5, S1   dermatomes.    IMAGING:  Multiple views of the left ankle demonstrate a trimalleolar fracture   dislocation.    ASSESSMENT:  A 43-year-old female with a left trimalleolar fracture   dislocation of the ankle.    PLAN:  I had a thorough discussion with patient regarding her diagnosis.  We   discussed conservative versus operative treatment.  After discussing   advantages and disadvantages of each, she elects to proceed with my   recommendation of surgery with open reduction internal fixation of left ankle.    She understands risks, benefits, alternatives of this procedure and wishes   to proceed.       ____________________________________     MD SIDNEY Silva / JOSE    DD:  04/10/2019 07:57:26  DT:  04/10/2019 10:05:16    D#:  [id number]  Job#:  [id number]

## 2019-06-12 LAB
BREATH ALCOHOL COMMENT: NORMAL
POC BREATHALIZER: 0 PERCENT (ref 0–0.01)

## 2019-10-04 ENCOUNTER — HOSPITAL ENCOUNTER (OUTPATIENT)
Dept: LAB | Facility: MEDICAL CENTER | Age: 44
End: 2019-10-04
Attending: OBSTETRICS & GYNECOLOGY
Payer: COMMERCIAL

## 2019-10-04 LAB
ALBUMIN SERPL BCP-MCNC: 4.1 G/DL (ref 3.2–4.9)
ALBUMIN/GLOB SERPL: 1.4 G/DL
ALP SERPL-CCNC: 61 U/L (ref 30–99)
ALT SERPL-CCNC: 7 U/L (ref 2–50)
ANION GAP SERPL CALC-SCNC: 8 MMOL/L (ref 0–11.9)
AST SERPL-CCNC: 13 U/L (ref 12–45)
BASOPHILS # BLD AUTO: 0.4 % (ref 0–1.8)
BASOPHILS # BLD: 0.02 K/UL (ref 0–0.12)
BILIRUB SERPL-MCNC: 0.5 MG/DL (ref 0.1–1.5)
BUN SERPL-MCNC: 14 MG/DL (ref 8–22)
CALCIUM SERPL-MCNC: 8.5 MG/DL (ref 8.5–10.5)
CHLORIDE SERPL-SCNC: 104 MMOL/L (ref 96–112)
CHOLEST SERPL-MCNC: 170 MG/DL (ref 100–199)
CO2 SERPL-SCNC: 26 MMOL/L (ref 20–33)
CREAT SERPL-MCNC: 0.86 MG/DL (ref 0.5–1.4)
EOSINOPHIL # BLD AUTO: 0.08 K/UL (ref 0–0.51)
EOSINOPHIL NFR BLD: 1.7 % (ref 0–6.9)
ERYTHROCYTE [DISTWIDTH] IN BLOOD BY AUTOMATED COUNT: 49.9 FL (ref 35.9–50)
FASTING STATUS PATIENT QL REPORTED: NORMAL
FSH SERPL-ACNC: 5 MIU/ML
GLOBULIN SER CALC-MCNC: 2.9 G/DL (ref 1.9–3.5)
GLUCOSE SERPL-MCNC: 76 MG/DL (ref 65–99)
HCT VFR BLD AUTO: 38.2 % (ref 37–47)
HDLC SERPL-MCNC: 65 MG/DL
HGB BLD-MCNC: 11.5 G/DL (ref 12–16)
IMM GRANULOCYTES # BLD AUTO: 0 K/UL (ref 0–0.11)
IMM GRANULOCYTES NFR BLD AUTO: 0 % (ref 0–0.9)
LDLC SERPL CALC-MCNC: 92 MG/DL
LYMPHOCYTES # BLD AUTO: 1.82 K/UL (ref 1–4.8)
LYMPHOCYTES NFR BLD: 38.5 % (ref 22–41)
MCH RBC QN AUTO: 25.7 PG (ref 27–33)
MCHC RBC AUTO-ENTMCNC: 30.1 G/DL (ref 33.6–35)
MCV RBC AUTO: 85.3 FL (ref 81.4–97.8)
MONOCYTES # BLD AUTO: 0.41 K/UL (ref 0–0.85)
MONOCYTES NFR BLD AUTO: 8.7 % (ref 0–13.4)
NEUTROPHILS # BLD AUTO: 2.4 K/UL (ref 2–7.15)
NEUTROPHILS NFR BLD: 50.7 % (ref 44–72)
NRBC # BLD AUTO: 0 K/UL
NRBC BLD-RTO: 0 /100 WBC
PLATELET # BLD AUTO: 283 K/UL (ref 164–446)
PMV BLD AUTO: 10 FL (ref 9–12.9)
POTASSIUM SERPL-SCNC: 3.6 MMOL/L (ref 3.6–5.5)
PROT SERPL-MCNC: 7 G/DL (ref 6–8.2)
RBC # BLD AUTO: 4.48 M/UL (ref 4.2–5.4)
SODIUM SERPL-SCNC: 138 MMOL/L (ref 135–145)
TRIGL SERPL-MCNC: 64 MG/DL (ref 0–149)
TSH SERPL DL<=0.005 MIU/L-ACNC: 3.16 UIU/ML (ref 0.38–5.33)
WBC # BLD AUTO: 4.7 K/UL (ref 4.8–10.8)

## 2019-10-04 PROCEDURE — 84443 ASSAY THYROID STIM HORMONE: CPT

## 2019-10-04 PROCEDURE — 80053 COMPREHEN METABOLIC PANEL: CPT

## 2019-10-04 PROCEDURE — 83001 ASSAY OF GONADOTROPIN (FSH): CPT

## 2019-10-04 PROCEDURE — 85025 COMPLETE CBC W/AUTO DIFF WBC: CPT

## 2019-10-04 PROCEDURE — 80061 LIPID PANEL: CPT

## 2019-10-04 PROCEDURE — 36415 COLL VENOUS BLD VENIPUNCTURE: CPT

## 2019-10-21 ENCOUNTER — HOSPITAL ENCOUNTER (OUTPATIENT)
Dept: RADIOLOGY | Facility: MEDICAL CENTER | Age: 44
End: 2019-10-21
Attending: OBSTETRICS & GYNECOLOGY
Payer: COMMERCIAL

## 2019-10-21 DIAGNOSIS — N94.6 DYSMENORRHEA: ICD-10-CM

## 2019-10-21 DIAGNOSIS — N92.1 MENOMETRORRHAGIA: ICD-10-CM

## 2019-10-21 PROCEDURE — 76830 TRANSVAGINAL US NON-OB: CPT

## 2019-12-01 ENCOUNTER — APPOINTMENT (OUTPATIENT)
Dept: URGENT CARE | Facility: CLINIC | Age: 44
End: 2019-12-01
Payer: COMMERCIAL

## 2019-12-01 ENCOUNTER — HOSPITAL ENCOUNTER (INPATIENT)
Facility: MEDICAL CENTER | Age: 44
LOS: 8 days | DRG: 493 | End: 2019-12-09
Attending: EMERGENCY MEDICINE | Admitting: HOSPITALIST
Payer: COMMERCIAL

## 2019-12-01 ENCOUNTER — APPOINTMENT (OUTPATIENT)
Dept: RADIOLOGY | Facility: MEDICAL CENTER | Age: 44
DRG: 493 | End: 2019-12-01
Attending: EMERGENCY MEDICINE
Payer: COMMERCIAL

## 2019-12-01 DIAGNOSIS — L03.116 CELLULITIS OF LEFT LOWER EXTREMITY: Primary | ICD-10-CM

## 2019-12-01 DIAGNOSIS — M86.172 OTHER ACUTE OSTEOMYELITIS OF LEFT ANKLE (HCC): ICD-10-CM

## 2019-12-01 DIAGNOSIS — G89.18 ACUTE POSTOPERATIVE PAIN OF EXTREMITY: ICD-10-CM

## 2019-12-01 PROBLEM — M06.9 RHEUMATOID ARTHRITIS (HCC): Status: ACTIVE | Noted: 2019-12-01

## 2019-12-01 LAB
ALBUMIN SERPL BCP-MCNC: 4 G/DL (ref 3.2–4.9)
ALBUMIN/GLOB SERPL: 1.3 G/DL
ALP SERPL-CCNC: 64 U/L (ref 30–99)
ALT SERPL-CCNC: 9 U/L (ref 2–50)
ANION GAP SERPL CALC-SCNC: 8 MMOL/L (ref 0–11.9)
AST SERPL-CCNC: 14 U/L (ref 12–45)
BASOPHILS # BLD AUTO: 0.1 % (ref 0–1.8)
BASOPHILS # BLD: 0.01 K/UL (ref 0–0.12)
BILIRUB SERPL-MCNC: 0.3 MG/DL (ref 0.1–1.5)
BUN SERPL-MCNC: 15 MG/DL (ref 8–22)
CALCIUM SERPL-MCNC: 9.1 MG/DL (ref 8.5–10.5)
CHLORIDE SERPL-SCNC: 104 MMOL/L (ref 96–112)
CO2 SERPL-SCNC: 26 MMOL/L (ref 20–33)
CREAT SERPL-MCNC: 0.82 MG/DL (ref 0.5–1.4)
CRP SERPL HS-MCNC: 1.99 MG/DL (ref 0–0.75)
EOSINOPHIL # BLD AUTO: 0.16 K/UL (ref 0–0.51)
EOSINOPHIL NFR BLD: 2.1 % (ref 0–6.9)
ERYTHROCYTE [DISTWIDTH] IN BLOOD BY AUTOMATED COUNT: 50.7 FL (ref 35.9–50)
ERYTHROCYTE [SEDIMENTATION RATE] IN BLOOD BY WESTERGREN METHOD: 36 MM/HOUR (ref 0–20)
GLOBULIN SER CALC-MCNC: 3.2 G/DL (ref 1.9–3.5)
GLUCOSE SERPL-MCNC: 107 MG/DL (ref 65–99)
HCT VFR BLD AUTO: 36.1 % (ref 37–47)
HGB BLD-MCNC: 11.3 G/DL (ref 12–16)
IMM GRANULOCYTES # BLD AUTO: 0.02 K/UL (ref 0–0.11)
IMM GRANULOCYTES NFR BLD AUTO: 0.3 % (ref 0–0.9)
LYMPHOCYTES # BLD AUTO: 2.39 K/UL (ref 1–4.8)
LYMPHOCYTES NFR BLD: 31.2 % (ref 22–41)
MCH RBC QN AUTO: 25.9 PG (ref 27–33)
MCHC RBC AUTO-ENTMCNC: 31.3 G/DL (ref 33.6–35)
MCV RBC AUTO: 82.8 FL (ref 81.4–97.8)
MONOCYTES # BLD AUTO: 0.48 K/UL (ref 0–0.85)
MONOCYTES NFR BLD AUTO: 6.3 % (ref 0–13.4)
NEUTROPHILS # BLD AUTO: 4.61 K/UL (ref 2–7.15)
NEUTROPHILS NFR BLD: 60 % (ref 44–72)
NRBC # BLD AUTO: 0 K/UL
NRBC BLD-RTO: 0 /100 WBC
PLATELET # BLD AUTO: 265 K/UL (ref 164–446)
PMV BLD AUTO: 9.6 FL (ref 9–12.9)
POTASSIUM SERPL-SCNC: 3.6 MMOL/L (ref 3.6–5.5)
PROT SERPL-MCNC: 7.2 G/DL (ref 6–8.2)
RBC # BLD AUTO: 4.36 M/UL (ref 4.2–5.4)
SODIUM SERPL-SCNC: 138 MMOL/L (ref 135–145)
WBC # BLD AUTO: 7.7 K/UL (ref 4.8–10.8)

## 2019-12-01 PROCEDURE — 99223 1ST HOSP IP/OBS HIGH 75: CPT | Performed by: HOSPITALIST

## 2019-12-01 PROCEDURE — 99285 EMERGENCY DEPT VISIT HI MDM: CPT

## 2019-12-01 PROCEDURE — 86140 C-REACTIVE PROTEIN: CPT

## 2019-12-01 PROCEDURE — 73610 X-RAY EXAM OF ANKLE: CPT | Mod: LT

## 2019-12-01 PROCEDURE — 770006 HCHG ROOM/CARE - MED/SURG/GYN SEMI*

## 2019-12-01 PROCEDURE — 700111 HCHG RX REV CODE 636 W/ 250 OVERRIDE (IP): Performed by: EMERGENCY MEDICINE

## 2019-12-01 PROCEDURE — 85652 RBC SED RATE AUTOMATED: CPT

## 2019-12-01 PROCEDURE — 700102 HCHG RX REV CODE 250 W/ 637 OVERRIDE(OP): Performed by: EMERGENCY MEDICINE

## 2019-12-01 PROCEDURE — 96365 THER/PROPH/DIAG IV INF INIT: CPT

## 2019-12-01 PROCEDURE — 700105 HCHG RX REV CODE 258: Performed by: EMERGENCY MEDICINE

## 2019-12-01 PROCEDURE — 76882 US LMTD JT/FCL EVL NVASC XTR: CPT | Mod: LT

## 2019-12-01 PROCEDURE — A9270 NON-COVERED ITEM OR SERVICE: HCPCS | Performed by: EMERGENCY MEDICINE

## 2019-12-01 PROCEDURE — 85025 COMPLETE CBC W/AUTO DIFF WBC: CPT

## 2019-12-01 PROCEDURE — 80053 COMPREHEN METABOLIC PANEL: CPT

## 2019-12-01 RX ORDER — PROMETHAZINE HYDROCHLORIDE 12.5 MG/1
12.5-25 SUPPOSITORY RECTAL EVERY 4 HOURS PRN
Status: DISCONTINUED | OUTPATIENT
Start: 2019-12-01 | End: 2019-12-09 | Stop reason: HOSPADM

## 2019-12-01 RX ORDER — PROCHLORPERAZINE EDISYLATE 5 MG/ML
5-10 INJECTION INTRAMUSCULAR; INTRAVENOUS EVERY 4 HOURS PRN
Status: DISCONTINUED | OUTPATIENT
Start: 2019-12-01 | End: 2019-12-09 | Stop reason: HOSPADM

## 2019-12-01 RX ORDER — AMOXICILLIN 250 MG
2 CAPSULE ORAL 2 TIMES DAILY
Status: DISCONTINUED | OUTPATIENT
Start: 2019-12-01 | End: 2019-12-09 | Stop reason: HOSPADM

## 2019-12-01 RX ORDER — MORPHINE SULFATE 4 MG/ML
2 INJECTION, SOLUTION INTRAMUSCULAR; INTRAVENOUS
Status: DISCONTINUED | OUTPATIENT
Start: 2019-12-01 | End: 2019-12-09 | Stop reason: HOSPADM

## 2019-12-01 RX ORDER — ONDANSETRON 2 MG/ML
4 INJECTION INTRAMUSCULAR; INTRAVENOUS EVERY 4 HOURS PRN
Status: DISCONTINUED | OUTPATIENT
Start: 2019-12-01 | End: 2019-12-09 | Stop reason: HOSPADM

## 2019-12-01 RX ORDER — HYDROXYCHLOROQUINE SULFATE 200 MG/1
200 TABLET, FILM COATED ORAL 2 TIMES DAILY
Refills: 3 | COMMUNITY
Start: 2019-09-11

## 2019-12-01 RX ORDER — ACETAMINOPHEN 325 MG/1
650 TABLET ORAL EVERY 6 HOURS PRN
Status: DISCONTINUED | OUTPATIENT
Start: 2019-12-01 | End: 2019-12-09 | Stop reason: HOSPADM

## 2019-12-01 RX ORDER — OXYCODONE HYDROCHLORIDE 5 MG/1
5 TABLET ORAL
Status: DISCONTINUED | OUTPATIENT
Start: 2019-12-01 | End: 2019-12-09 | Stop reason: HOSPADM

## 2019-12-01 RX ORDER — PROMETHAZINE HYDROCHLORIDE 25 MG/1
12.5-25 TABLET ORAL EVERY 4 HOURS PRN
Status: DISCONTINUED | OUTPATIENT
Start: 2019-12-01 | End: 2019-12-09 | Stop reason: HOSPADM

## 2019-12-01 RX ORDER — CLONIDINE HYDROCHLORIDE 0.1 MG/1
0.1 TABLET ORAL EVERY 6 HOURS PRN
Status: DISCONTINUED | OUTPATIENT
Start: 2019-12-01 | End: 2019-12-09 | Stop reason: HOSPADM

## 2019-12-01 RX ORDER — RIFAMPIN 300 MG/1
300 CAPSULE ORAL ONCE
Status: DISCONTINUED | OUTPATIENT
Start: 2019-12-01 | End: 2019-12-02

## 2019-12-01 RX ORDER — HYDROCHLOROTHIAZIDE 25 MG/1
25 TABLET ORAL DAILY
Status: DISCONTINUED | OUTPATIENT
Start: 2019-12-02 | End: 2019-12-09 | Stop reason: HOSPADM

## 2019-12-01 RX ORDER — OXYCODONE HYDROCHLORIDE 5 MG/1
2.5 TABLET ORAL
Status: DISCONTINUED | OUTPATIENT
Start: 2019-12-01 | End: 2019-12-09 | Stop reason: HOSPADM

## 2019-12-01 RX ORDER — LISINOPRIL 10 MG/1
10 TABLET ORAL DAILY
COMMUNITY
End: 2020-01-09

## 2019-12-01 RX ORDER — HYDROCODONE BITARTRATE AND ACETAMINOPHEN 5; 325 MG/1; MG/1
1 TABLET ORAL ONCE
Status: COMPLETED | OUTPATIENT
Start: 2019-12-01 | End: 2019-12-01

## 2019-12-01 RX ORDER — ONDANSETRON 4 MG/1
4 TABLET, ORALLY DISINTEGRATING ORAL EVERY 4 HOURS PRN
Status: DISCONTINUED | OUTPATIENT
Start: 2019-12-01 | End: 2019-12-09 | Stop reason: HOSPADM

## 2019-12-01 RX ORDER — POLYETHYLENE GLYCOL 3350 17 G/17G
1 POWDER, FOR SOLUTION ORAL
Status: DISCONTINUED | OUTPATIENT
Start: 2019-12-01 | End: 2019-12-09 | Stop reason: HOSPADM

## 2019-12-01 RX ORDER — BISACODYL 10 MG
10 SUPPOSITORY, RECTAL RECTAL
Status: DISCONTINUED | OUTPATIENT
Start: 2019-12-01 | End: 2019-12-09 | Stop reason: HOSPADM

## 2019-12-01 RX ORDER — KETOROLAC TROMETHAMINE 30 MG/ML
30 INJECTION, SOLUTION INTRAMUSCULAR; INTRAVENOUS EVERY 6 HOURS PRN
Status: DISPENSED | OUTPATIENT
Start: 2019-12-01 | End: 2019-12-06

## 2019-12-01 RX ORDER — LISINOPRIL AND HYDROCHLOROTHIAZIDE 12.5; 1 MG/1; MG/1
1 TABLET ORAL DAILY
Status: DISCONTINUED | OUTPATIENT
Start: 2019-12-02 | End: 2019-12-02

## 2019-12-01 RX ADMIN — HYDROCODONE BITARTRATE AND ACETAMINOPHEN 1 TABLET: 5; 325 TABLET ORAL at 20:23

## 2019-12-01 RX ADMIN — AMPICILLIN SODIUM AND SULBACTAM SODIUM 3 G: 2; 1 INJECTION, POWDER, FOR SOLUTION INTRAMUSCULAR; INTRAVENOUS at 22:38

## 2019-12-01 SDOH — HEALTH STABILITY: MENTAL HEALTH: HOW MANY STANDARD DRINKS CONTAINING ALCOHOL DO YOU HAVE ON A TYPICAL DAY?: 1 OR 2

## 2019-12-01 SDOH — HEALTH STABILITY: MENTAL HEALTH: HOW OFTEN DO YOU HAVE A DRINK CONTAINING ALCOHOL?: 2-4 TIMES A MONTH

## 2019-12-01 SDOH — HEALTH STABILITY: MENTAL HEALTH: HOW OFTEN DO YOU HAVE 6 OR MORE DRINKS ON ONE OCCASION?: NEVER

## 2019-12-01 ASSESSMENT — ENCOUNTER SYMPTOMS
NEUROLOGICAL NEGATIVE: 1
GASTROINTESTINAL NEGATIVE: 1
RESPIRATORY NEGATIVE: 1
FEVER: 0
CONSTITUTIONAL NEGATIVE: 1
EYES NEGATIVE: 1
CARDIOVASCULAR NEGATIVE: 1
PSYCHIATRIC NEGATIVE: 1

## 2019-12-01 NOTE — LETTER
[facility], EMERGENCY DEPT   [address], [place] [address]  Phone: Dept: [phone] - Fax:        [facility] Progress Report and Disability Certification  Date of Service: 12/1/2019   No Show:  No  Date / Time of Next Visit:     Claim Information   Patient Name: Annalise Woodard  Claim Number:     Employer: [employer]  Date of Injury: 4/8/2019     Insurer / TPA: [employer] ID / SSN: [id number]   Occupation:  Diagnosis: The encounter diagnosis was Cellulitis of left lower extremity.    Medical Information   Related to Industrial Injury?   Yes   Subjective Complaints:  Left ankle infection, cellulitis, possible osteomyelitis with hardware   Objective Findings: Soft tissue changes on Ultrasound, elevated ESR/CRP   Pre-Existing Condition(s):     Assessment:   Condition Worsened    Status: Additional Care Required  Comments:Admission ot hospital  Permanent Disability:  Comments:to be determined by occupational health    Plan:   Comments:admission to hospital from ER today    Diagnostics: X-rayOther (see comment)    Comments:  Soft tissue ultrasound    Disability Information   Status:   Comments:currently not working. return to work to be determined by occupational health    From:     Through:   Restrictions are:     Physical Restrictions   Sitting:    Comments:to be determined by occupational health Standing:    Comments:to be determined by occupational health Stooping:    Comments:to be determined by occupational health Bending:      Squatting:    Comments:to be determined by occupational health Walking:    Comments:to be determined by occupational health Climbing:    Comments:to be determined by occupational health Pushing:    Comments:to be determined by occupational health   Pulling:    Comments:to be determined by occupational health Other:    Comments:to be determined by occupational  health Reaching Above Shoulder (L):   Comments:to be determined by occupational health Reaching Above Shoulder (R):       Reaching Below Shoulder (L):    Reaching Below Shoulder (R):      Not to exceed Weight Limits   Carrying(hrs):   Comments:to be determined by occupational health Weight Limit(lb):   Lifting(hrs):   Comments:to be determined by occupational health Weight  Limit(lb):     Comments: to be determined by occupational health    Repetitive Actions   Hands: i.e. Fine Manipulations from Grasping:     Feet: i.e. Operating Foot Controls:     Driving / Operate Machinery:     Physician Name: Lake Maldonado Ii Physician Signature: LAKE Lopez II, M.D. e-Signature:  , Medical Director   Clinic Name / Location: [facility], EMERGENCY DEPT  [address] [place] [id number]  [phone]     Clinic Phone Number: Dept: [phone]   Appointment Time:  Visit Start Time:    Check-In Time:  7:17 PM Visit Discharge Time:    Original-Treating Physician or Chiropractor    Page 2-Insurer/TPA    Page 3-Employer    Page 4-Employee

## 2019-12-02 ENCOUNTER — APPOINTMENT (OUTPATIENT)
Dept: RADIOLOGY | Facility: MEDICAL CENTER | Age: 44
DRG: 493 | End: 2019-12-02
Attending: HOSPITALIST
Payer: COMMERCIAL

## 2019-12-02 LAB
ANION GAP SERPL CALC-SCNC: 9 MMOL/L (ref 0–11.9)
BASOPHILS # BLD AUTO: 0.2 % (ref 0–1.8)
BASOPHILS # BLD: 0.01 K/UL (ref 0–0.12)
BUN SERPL-MCNC: 13 MG/DL (ref 8–22)
CALCIUM SERPL-MCNC: 8.2 MG/DL (ref 8.5–10.5)
CHLORIDE SERPL-SCNC: 106 MMOL/L (ref 96–112)
CO2 SERPL-SCNC: 22 MMOL/L (ref 20–33)
CREAT SERPL-MCNC: 0.66 MG/DL (ref 0.5–1.4)
EOSINOPHIL # BLD AUTO: 0.16 K/UL (ref 0–0.51)
EOSINOPHIL NFR BLD: 2.6 % (ref 0–6.9)
ERYTHROCYTE [DISTWIDTH] IN BLOOD BY AUTOMATED COUNT: 50.8 FL (ref 35.9–50)
GLUCOSE SERPL-MCNC: 96 MG/DL (ref 65–99)
HCT VFR BLD AUTO: 33.9 % (ref 37–47)
HGB BLD-MCNC: 10.5 G/DL (ref 12–16)
IMM GRANULOCYTES # BLD AUTO: 0.02 K/UL (ref 0–0.11)
IMM GRANULOCYTES NFR BLD AUTO: 0.3 % (ref 0–0.9)
LYMPHOCYTES # BLD AUTO: 1.56 K/UL (ref 1–4.8)
LYMPHOCYTES NFR BLD: 25.2 % (ref 22–41)
MCH RBC QN AUTO: 25.5 PG (ref 27–33)
MCHC RBC AUTO-ENTMCNC: 31 G/DL (ref 33.6–35)
MCV RBC AUTO: 82.5 FL (ref 81.4–97.8)
MONOCYTES # BLD AUTO: 0.38 K/UL (ref 0–0.85)
MONOCYTES NFR BLD AUTO: 6.1 % (ref 0–13.4)
NEUTROPHILS # BLD AUTO: 4.06 K/UL (ref 2–7.15)
NEUTROPHILS NFR BLD: 65.6 % (ref 44–72)
NRBC # BLD AUTO: 0 K/UL
NRBC BLD-RTO: 0 /100 WBC
PLATELET # BLD AUTO: 237 K/UL (ref 164–446)
PMV BLD AUTO: 9.2 FL (ref 9–12.9)
POTASSIUM SERPL-SCNC: 3.7 MMOL/L (ref 3.6–5.5)
RBC # BLD AUTO: 4.11 M/UL (ref 4.2–5.4)
SODIUM SERPL-SCNC: 137 MMOL/L (ref 135–145)
WBC # BLD AUTO: 6.2 K/UL (ref 4.8–10.8)

## 2019-12-02 PROCEDURE — 700105 HCHG RX REV CODE 258

## 2019-12-02 PROCEDURE — 78315 BONE IMAGING 3 PHASE: CPT

## 2019-12-02 PROCEDURE — 700111 HCHG RX REV CODE 636 W/ 250 OVERRIDE (IP): Performed by: EMERGENCY MEDICINE

## 2019-12-02 PROCEDURE — 770006 HCHG ROOM/CARE - MED/SURG/GYN SEMI*

## 2019-12-02 PROCEDURE — A9270 NON-COVERED ITEM OR SERVICE: HCPCS | Performed by: HOSPITALIST

## 2019-12-02 PROCEDURE — 36415 COLL VENOUS BLD VENIPUNCTURE: CPT

## 2019-12-02 PROCEDURE — 80048 BASIC METABOLIC PNL TOTAL CA: CPT

## 2019-12-02 PROCEDURE — 700102 HCHG RX REV CODE 250 W/ 637 OVERRIDE(OP): Performed by: HOSPITALIST

## 2019-12-02 PROCEDURE — 700105 HCHG RX REV CODE 258: Performed by: EMERGENCY MEDICINE

## 2019-12-02 PROCEDURE — 99233 SBSQ HOSP IP/OBS HIGH 50: CPT | Performed by: INTERNAL MEDICINE

## 2019-12-02 PROCEDURE — 700111 HCHG RX REV CODE 636 W/ 250 OVERRIDE (IP): Performed by: HOSPITALIST

## 2019-12-02 PROCEDURE — 85025 COMPLETE CBC W/AUTO DIFF WBC: CPT

## 2019-12-02 RX ORDER — SODIUM CHLORIDE 9 MG/ML
INJECTION, SOLUTION INTRAVENOUS
Status: COMPLETED
Start: 2019-12-02 | End: 2019-12-02

## 2019-12-02 RX ORDER — LISINOPRIL 10 MG/1
10 TABLET ORAL
Status: DISCONTINUED | OUTPATIENT
Start: 2019-12-02 | End: 2019-12-09 | Stop reason: HOSPADM

## 2019-12-02 RX ADMIN — HYDROCHLOROTHIAZIDE 25 MG: 25 TABLET ORAL at 05:35

## 2019-12-02 RX ADMIN — VANCOMYCIN HYDROCHLORIDE 2800 MG: 500 INJECTION, POWDER, LYOPHILIZED, FOR SOLUTION INTRAVENOUS at 00:30

## 2019-12-02 RX ADMIN — KETOROLAC TROMETHAMINE 30 MG: 30 INJECTION, SOLUTION INTRAMUSCULAR at 18:21

## 2019-12-02 RX ADMIN — OXYCODONE HYDROCHLORIDE 5 MG: 5 TABLET ORAL at 18:21

## 2019-12-02 RX ADMIN — LISINOPRIL 10 MG: 10 TABLET ORAL at 05:35

## 2019-12-02 RX ADMIN — ENOXAPARIN SODIUM 40 MG: 100 INJECTION SUBCUTANEOUS at 05:36

## 2019-12-02 RX ADMIN — SODIUM CHLORIDE 500 ML: 9 INJECTION, SOLUTION INTRAVENOUS at 00:30

## 2019-12-02 ASSESSMENT — COGNITIVE AND FUNCTIONAL STATUS - GENERAL
SUGGESTED CMS G CODE MODIFIER DAILY ACTIVITY: CH
SUGGESTED CMS G CODE MODIFIER MOBILITY: CH
DAILY ACTIVITIY SCORE: 24
MOBILITY SCORE: 24

## 2019-12-02 ASSESSMENT — LIFESTYLE VARIABLES
ON A TYPICAL DAY WHEN YOU DRINK ALCOHOL HOW MANY DRINKS DO YOU HAVE: 1
TOTAL SCORE: 0
DOES PATIENT WANT TO STOP DRINKING: NO
EVER FELT BAD OR GUILTY ABOUT YOUR DRINKING: NO
ALCOHOL_USE: YES
AVERAGE NUMBER OF DAYS PER WEEK YOU HAVE A DRINK CONTAINING ALCOHOL: 2
TOTAL SCORE: 0
HOW MANY TIMES IN THE PAST YEAR HAVE YOU HAD 5 OR MORE DRINKS IN A DAY: 4
TOTAL SCORE: 0
EVER_SMOKED: NEVER
EVER HAD A DRINK FIRST THING IN THE MORNING TO STEADY YOUR NERVES TO GET RID OF A HANGOVER: NO
HAVE PEOPLE ANNOYED YOU BY CRITICIZING YOUR DRINKING: NO
HAVE YOU EVER FELT YOU SHOULD CUT DOWN ON YOUR DRINKING: NO
CONSUMPTION TOTAL: POSITIVE

## 2019-12-02 ASSESSMENT — ENCOUNTER SYMPTOMS
ABDOMINAL PAIN: 0
FOCAL WEAKNESS: 1
COUGH: 0
NAUSEA: 0
SHORTNESS OF BREATH: 0
VOMITING: 0
MYALGIAS: 1
FEVER: 0
CHILLS: 0

## 2019-12-02 ASSESSMENT — PATIENT HEALTH QUESTIONNAIRE - PHQ9
SUM OF ALL RESPONSES TO PHQ9 QUESTIONS 1 AND 2: 0
1. LITTLE INTEREST OR PLEASURE IN DOING THINGS: NOT AT ALL
2. FEELING DOWN, DEPRESSED, IRRITABLE, OR HOPELESS: NOT AT ALL

## 2019-12-02 NOTE — ED NOTES
Med rec updated and complete. Allergies reviewed. Pt denies antibiotic use in last 14 days.   Home pharmacy [facility].

## 2019-12-02 NOTE — ED NOTES
Assumed care of pt to rm [facility] from [facility]. Pt in no acute distress, warm blanket applied, reports pain resolved with medication per MAR, denies other needs, await ready bed.

## 2019-12-02 NOTE — PROGRESS NOTES
Bedside report received.  Assessment complete.  A&O x 4. Patient calls appropriately.  Patient in bed with no assist. Bed alarm n/a.   Patient has 0/10 pain. Denies pain interventions.  Denies N&V. Tolerating regular diet.  Swelling noted on LLE.  + void, + flatus, + BM.  Patient denies SOB.  SCD's refused.  Patient has family at bedside, visiting hours explained.  Review plan with of care with patient. Call light and personal belongings with in reach. Hourly rounding in place. All needs met at this time.    2 RN SKIN CHECK:    Old scars from sx in April on LLE, healed and approximated.    All other skin intact, no evidence of skin breakdown.

## 2019-12-02 NOTE — ASSESSMENT & PLAN NOTE
12/6 - 12/8 -  Stable. Continue IV antibiotics. Remain inpatient until [facility] arranged.   12/5 - Post-op cultures no growth. Will most likely need 6 weeks of IV antibiotics given hardware infection. PICC line today. [facility] referral placed.   12/4 - s/p I&D and hardware removal. Started on rocephin and vanc post-op  12/3 - ortho consulted. Bone scan ordered for osteomyelitis, indeterminate.  ID consulted.

## 2019-12-02 NOTE — CARE PLAN
Problem: Communication  Goal: The ability to communicate needs accurately and effectively will improve  Outcome: PROGRESSING AS EXPECTED  Note:   Patient communicating effectively with staff about pain, questions, and other concerns with treatment. All questions answered.       Problem: Safety  Goal: Will remain free from injury  Outcome: PROGRESSING AS EXPECTED  Note:   Patient's call light within reach, bed in lowest and locked position, threaded socks on, educated on waiting for assistance.

## 2019-12-02 NOTE — ASSESSMENT & PLAN NOTE
12/2-12/8 - stable.  12/1- adm -  On methotrexate/plaquenil regimen and PRN NSAID therapy.  Stable.

## 2019-12-02 NOTE — ASSESSMENT & PLAN NOTE
12/2- 12/8- Stable.   12/1- adm - Controlled with current medication regimen which will be continued.  Monitor.

## 2019-12-02 NOTE — H&P
Hospital Medicine History & Physical Note    Date of Service  12/1/2019    Primary Care Physician  LAYA Carballo.    Consultants  [facility] [place]    Code Status  Full code     Chief Complaint  Left ankle pain    History of Presenting Illness  44 y.o. female with history of prior ankle fracture status post surgical repair with hardware, systemic lupus erythematosus which is controlled, and essential hypertension on medical regimen, was in her usual state of health which includes regular pain in her ankle which occurs at the end of the day and is improved by awakening, noted something different on the day of admission.  She awoke with ankle pain, and a swelling at the surgical site, which seemed to increase throughout the day instead of improving.  She subsequently presented to the emergency department for further evaluation.  She has no other complaints, including chest pain, shortness of breath, abdominal pain, nausea vomiting, diarrhea or constipation.  No reported fever or chills.    Review of Systems  Review of Systems   Constitutional: Negative.    HENT: Negative.    Eyes: Negative.    Respiratory: Negative.    Cardiovascular: Negative.    Gastrointestinal: Negative.    Genitourinary: Negative.    Musculoskeletal: Positive for joint pain.   Skin: Negative.    Neurological: Negative.    Endo/Heme/Allergies: Negative.    Psychiatric/Behavioral: Negative.        Past Medical History   has a past medical history of ASTHMA, Hypertension, Lupus (HCC), and Rheumatoid arthritis (HCC).    Surgical History   has a past surgical history that includes ankle orif (Left, 4/8/2019).     Family History  family history includes Heart Attack in her mother.     Social History   reports that she has never smoked. She has never used smokeless tobacco. She reports current alcohol use. She reports that she does not use drugs.    Allergies  No Known Allergies    Medications  Prior to Admission Medications    Prescriptions Last Dose Informant Patient Reported? Taking?   Cholecalciferol (VITAMIN D3 PO)  Patient Yes No   Sig: Take 2 Caps by mouth every day.   folic acid (FOLVITE) 1 MG Tab  Patient Yes No   Sig: Take 1 mg by mouth every day.   hydroCHLOROthiazide (HYDRODIURIL) 25 MG Tab  Patient Yes No   Sig: Take 25 mg by mouth every day.   lisinopril-hydrochlorothiazide (PRINZIDE, ZESTORETIC) 10-12.5 MG per tablet  Patient Yes No   Sig: Take 1 Tab by mouth every day.   methotrexate 2.5 MG Tab  Patient Yes No   Sig: Take 10 mg by mouth every 7 days. On Monday   naproxen (ALEVE) 220 MG tablet  Patient Yes No   Sig: Take 440 mg by mouth as needed.      Facility-Administered Medications: None       Physical Exam  Temp:  [36.8 °C (98.2 °F)] 36.8 °C (98.2 °F)  Pulse:  [75-87] 75  Resp:  [18] 18  BP: (133-147)/(104-106) 133/104  SpO2:  [98 %-100 %] 98 %    Physical Exam  Vitals signs and nursing note reviewed.   Constitutional:       Appearance: Normal appearance.   HENT:      Head: Normocephalic and atraumatic.      Nose: Nose normal.      Mouth/Throat:      Mouth: Mucous membranes are moist.   Eyes:      Extraocular Movements: Extraocular movements intact.      Conjunctiva/sclera: Conjunctivae normal.      Pupils: Pupils are equal, round, and reactive to light.   Neck:      Musculoskeletal: Normal range of motion and neck supple.   Cardiovascular:      Rate and Rhythm: Normal rate and regular rhythm.      Pulses: Normal pulses.      Heart sounds: Normal heart sounds. No murmur. No friction rub. No gallop.    Pulmonary:      Effort: Pulmonary effort is normal. No respiratory distress.      Breath sounds: Normal breath sounds. No wheezing.   Abdominal:      General: Abdomen is flat. Bowel sounds are normal.      Palpations: Abdomen is soft.   Musculoskeletal: Normal range of motion.         General: Swelling present.      Comments: Small area of swelling to left lateral ankle, exquisitely tender to the touch.   Skin:      General: Skin is warm and dry.   Neurological:      General: No focal deficit present.      Mental Status: She is alert and oriented to person, place, and time.         Laboratory:  Recent Labs     12/01/19 2025   WBC 7.7   RBC 4.36   HEMOGLOBIN 11.3*   HEMATOCRIT 36.1*   MCV 82.8   MCH 25.9*   MCHC 31.3*   RDW 50.7*   PLATELETCT 265   MPV 9.6     Recent Labs     12/01/19 2025   SODIUM 138   POTASSIUM 3.6   CHLORIDE 104   CO2 26   GLUCOSE 107*   BUN 15   CREATININE 0.82   CALCIUM 9.1     Recent Labs     12/01/19 2025   ALTSGPT 9   ASTSGOT 14   ALKPHOSPHAT 64   TBILIRUBIN 0.3   GLUCOSE 107*         No results for input(s): NTPROBNP in the last 72 hours.      No results for input(s): TROPONINT in the last 72 hours.    Urinalysis:    No results found     Imaging:  US-EXTREMITY NON VASCULAR UNILATERAL LEFT   Final Result      Diffuse edema which could indicate cellulitis. No abscess visualized.      DX-ANKLE 3+ VIEWS LEFT   Final Result   Addendum 1 of 1   Additional clinical history: Infection   In light of the above clinical history there is some irregularity in the    lateral malleolus cortex. Osteomyelitis is not excluded in this region.    Further assessment could be performed with bone scan.      Findings were discussed with DANIEL RUIZ II on 12/1/2019 10:04 PM.      Final      NM-BONE SCAN (LIMITED)    (Results Pending)   NM-BONE SCAN (LIMITED)    (Results Pending)         Assessment/Plan:  I anticipate this patient will require at least two midnights for appropriate medical management, necessitating inpatient admission.    * Cellulitis of left lower extremity  Assessment & Plan  Left ankle, in setting of prior hardware for complicated fracture.  Concern by ultrasound for underlying osteomyelitis.  Plan for bone scan per rads recommendation.  Hold antibiotic therapy for now as will obtain better cultures if surgical repair necessary.  Defer to orthopedic surgery.     Rheumatoid arthritis  (HCC)  Assessment & Plan  On methotrexate regimen and PRN NSAID therapy.  Stable.     Essential hypertension- (present on admission)  Assessment & Plan  Controlled with current medication regimen which will be continued.  Monitor.      Other forms of systemic lupus erythematosus (HCC)- (present on admission)  Assessment & Plan  On methotrexate therapy.  Monitor.         VTE prophylaxis: SCD, lovenox

## 2019-12-02 NOTE — PROGRESS NOTES
Hospital Medicine Daily Progress Note    Date of Service  12/2/2019    Chief Complaint  44 y.o. female admitted 12/1/2019 with cellulitis of the left ankle with hardware present     Hospital Course    see below      Interval Problem Update  L ankle-pain is reduced today but swelling is unchanged. She is somewhat concerned about the upcoming bone scan. This was explained in detail to the patient. Afebrile. .     Consultants/Specialty  ortho    Code Status  fcfc    Disposition  ORTHO    Review of Systems  Review of Systems   Constitutional: Negative for chills and fever.   Respiratory: Negative for cough and shortness of breath.    Gastrointestinal: Negative for abdominal pain, nausea and vomiting.   Musculoskeletal: Positive for joint pain and myalgias.   Neurological: Positive for focal weakness.   All other systems reviewed and are negative.       Physical Exam  Temp:  [36.6 °C (97.9 °F)-37.2 °C (99 °F)] 36.6 °C (97.9 °F)  Pulse:  [75-87] 83  Resp:  [13-18] 16  BP: (123-149)/() 149/109  SpO2:  [90 %-100 %] 90 %    Physical Exam  Vitals signs and nursing note reviewed.   Constitutional:       General: She is not in acute distress.     Appearance: She is well-developed.   HENT:      Head: Normocephalic and atraumatic.      Mouth/Throat:      Pharynx: No oropharyngeal exudate.   Eyes:      General: No scleral icterus.     Pupils: Pupils are equal, round, and reactive to light.   Neck:      Musculoskeletal: Normal range of motion and neck supple.      Thyroid: No thyromegaly.   Cardiovascular:      Rate and Rhythm: Normal rate and regular rhythm.      Heart sounds: Normal heart sounds. No murmur.   Pulmonary:      Effort: Pulmonary effort is normal. No respiratory distress.      Breath sounds: Normal breath sounds. No wheezing.   Abdominal:      General: Bowel sounds are normal. There is no distension.      Palpations: Abdomen is soft.      Tenderness: There is no tenderness.   Musculoskeletal: Normal range of  motion.         General: Swelling, tenderness and deformity present.      Left lower leg: Edema present.      Comments: LLE, proximal part of the incision with protrusion, no apparent fluid/secretions can be appreciated at this time   Skin:     General: Skin is warm and dry.      Findings: No rash.   Neurological:      Mental Status: She is alert and oriented to person, place, and time.      Cranial Nerves: No cranial nerve deficit.           Fluids    Intake/Output Summary (Last 24 hours) at 12/2/2019 1536  Last data filed at 12/2/2019 0030  Gross per 24 hour   Intake 1100 ml   Output --   Net 1100 ml       Laboratory  Recent Labs     12/01/19 2025 12/02/19  0353   WBC 7.7 6.2   RBC 4.36 4.11*   HEMOGLOBIN 11.3* 10.5*   HEMATOCRIT 36.1* 33.9*   MCV 82.8 82.5   MCH 25.9* 25.5*   MCHC 31.3* 31.0*   RDW 50.7* 50.8*   PLATELETCT 265 237   MPV 9.6 9.2     Recent Labs     12/01/19 2025 12/02/19  0353   SODIUM 138 137   POTASSIUM 3.6 3.7   CHLORIDE 104 106   CO2 26 22   GLUCOSE 107* 96   BUN 15 13   CREATININE 0.82 0.66   CALCIUM 9.1 8.2*                   Imaging  NM-BONE SCAN(LIMITED)/FLOW   Final Result      1.  There is increased activity on all 3 phases in the left ankle/distal tibial region .   2.  While this could represent osteomyelitis, the fact that there is fracture in this region based on x-rays, this could also simply represent cellulitis with bony remodeling causing the uptake on the delayed imaging.      US-EXTREMITY NON VASCULAR UNILATERAL LEFT   Final Result      Diffuse edema which could indicate cellulitis. No abscess visualized.      DX-ANKLE 3+ VIEWS LEFT   Final Result   Addendum 1 of 1   Additional clinical history: Infection   In light of the above clinical history there is some irregularity in the    lateral malleolus cortex. Osteomyelitis is not excluded in this region.    Further assessment could be performed with bone scan.      Findings were discussed with DANIEL RUIZ II on 12/1/2019  10:04 PM.      Final           Assessment/Plan  * Cellulitis of left lower extremity- (present on admission)  Assessment & Plan  Left ankle, in setting of prior hardware for complicated fracture.  Concern by ultrasound for underlying osteomyelitis.    -bone scan pending  -ortho following  -hold abx's as clinically stable at this time, consider ID consultation depending on Bone scan results    Rheumatoid arthritis (HCC)- (present on admission)  Assessment & Plan  On methotrexate/plaquenil regimen and PRN NSAID therapy.  Stable.   -hold outpatient therapy at this time    Essential hypertension- (present on admission)  Assessment & Plan  Controlled with current medication regimen which will be continued.  Monitor.      Other forms of systemic lupus erythematosus (HCC)- (present on admission)  Assessment & Plan  On methotrexate therapy.  Monitor.        VTE prophylaxis: SCD's

## 2019-12-02 NOTE — ED TRIAGE NOTES
"Chief Complaint   Patient presents with   • Ankle Pain     reports L ankle fracture in 4/2019 - suden onset pain today with L lateral \"lump\", CMS+.     Patient to triage via ambulation, with a limp, patient A&O x4.    Patient reports previous surgeon - Patricia from [facility].      Explained wait time and triage process to pt. Pt placed back in lobby, told to notify ED tech or triage RN of any changes, verbalized understanding.    "

## 2019-12-02 NOTE — DISCHARGE PLANNING
Anticipated Discharge Disposition: home no needs    Action: Met with pt at bedside. Pt confirmed information on facesheet. Per pt PTA she did nor use DME, reports no drug/etoh use and was independent of all IADL/ADLs. Pt states she lives with her son, daughter and father in apartment in [place]. Verified pts workers comp was being billed per facesheet. Pt pending consultation from orthopedic for further planning.     Barriers to Discharge: medical clearance    Plan: continue to follow pt for DC needs      Length of Stay: 1    Care Transition Team Assessment    Information Source  Orientation : Oriented x 4  Information Given By: Patient  Informant's Name: Annalise  Who is responsible for making decisions for patient? : Patient    Readmission Evaluation  Is this a readmission?: No    Elopement Risk  Legal Hold: No  Ambulatory or Self Mobile in Wheelchair: Yes  Disoriented: No  Psychiatric Symptoms: None  History of Wandering: No  Elopement this Admit: No  Vocalizing Wanting to Leave: No  Displays Behaviors, Body Language Wanting to Leave: No-Not at Risk for Elopement  Elopement Risk: Not at Risk for Elopement    Interdisciplinary Discharge Planning  Patient or legal guardian wants to designate a caregiver (see row info): No    Discharge Preparedness  What is your plan after discharge?: Skilled nursing facility  What are your discharge supports?: Child  Prior Functional Level: Ambulatory  Difficulity with ADLs: None  Difficulity with IADLs: None    Functional Assesment  Prior Functional Level: Ambulatory    Finances  Financial Barriers to Discharge: No  Prescription Coverage: Yes    Vision / Hearing Impairment  Vision Impairment : Yes  Right Eye Vision: Impaired, Wears Glasses  Left Eye Vision: Impaired, Wears Glasses  Hearing Impairment : No         Advance Directive  Advance Directive?: None  Advance Directive offered?: AD Booklet refused    Domestic Abuse  Have you ever been the victim of abuse or violence?:  No  Physical Abuse or Sexual Abuse: No  Verbal Abuse or Emotional Abuse: No  Possible Abuse Reported to:: Not Applicable    Psychological Assessment  History of Substance Abuse: None  History of Psychiatric Problems: No  Non-compliant with Treatment: No  Newly Diagnosed Illness: No    Discharge Risks or Barriers  Discharge risks or barriers?: Complex medical needs  Patient risk factors: Complex medical needs    Anticipated Discharge Information  Anticipated discharge disposition: Home  Discharge Address: [address] [place] [address] [address] [place] [address]  Discharge Contact Phone Number: [phone], daughterKatlin

## 2019-12-03 LAB
ANION GAP SERPL CALC-SCNC: 9 MMOL/L (ref 0–11.9)
BASOPHILS # BLD AUTO: 0 % (ref 0–1.8)
BASOPHILS # BLD: 0 K/UL (ref 0–0.12)
BUN SERPL-MCNC: 12 MG/DL (ref 8–22)
CALCIUM SERPL-MCNC: 8.3 MG/DL (ref 8.5–10.5)
CHLORIDE SERPL-SCNC: 105 MMOL/L (ref 96–112)
CO2 SERPL-SCNC: 22 MMOL/L (ref 20–33)
CREAT SERPL-MCNC: 0.7 MG/DL (ref 0.5–1.4)
EOSINOPHIL # BLD AUTO: 0.17 K/UL (ref 0–0.51)
EOSINOPHIL NFR BLD: 3.5 % (ref 0–6.9)
ERYTHROCYTE [DISTWIDTH] IN BLOOD BY AUTOMATED COUNT: 51.2 FL (ref 35.9–50)
GLUCOSE SERPL-MCNC: 87 MG/DL (ref 65–99)
HCT VFR BLD AUTO: 35.5 % (ref 37–47)
HGB BLD-MCNC: 11 G/DL (ref 12–16)
LYMPHOCYTES # BLD AUTO: 1.15 K/UL (ref 1–4.8)
LYMPHOCYTES NFR BLD: 23.9 % (ref 22–41)
MANUAL DIFF BLD: NORMAL
MCH RBC QN AUTO: 25.7 PG (ref 27–33)
MCHC RBC AUTO-ENTMCNC: 31 G/DL (ref 33.6–35)
MCV RBC AUTO: 82.9 FL (ref 81.4–97.8)
MONOCYTES # BLD AUTO: 0.21 K/UL (ref 0–0.85)
MONOCYTES NFR BLD AUTO: 4.4 % (ref 0–13.4)
MORPHOLOGY BLD-IMP: NORMAL
NEUTROPHILS # BLD AUTO: 3.27 K/UL (ref 2–7.15)
NEUTROPHILS NFR BLD: 68.2 % (ref 44–72)
NRBC # BLD AUTO: 0 K/UL
NRBC BLD-RTO: 0 /100 WBC
PLATELET # BLD AUTO: 231 K/UL (ref 164–446)
PLATELET BLD QL SMEAR: NORMAL
PMV BLD AUTO: 9.2 FL (ref 9–12.9)
POTASSIUM SERPL-SCNC: 3.9 MMOL/L (ref 3.6–5.5)
RBC # BLD AUTO: 4.28 M/UL (ref 4.2–5.4)
RBC BLD AUTO: NORMAL
SODIUM SERPL-SCNC: 136 MMOL/L (ref 135–145)
WBC # BLD AUTO: 4.8 K/UL (ref 4.8–10.8)

## 2019-12-03 PROCEDURE — 700102 HCHG RX REV CODE 250 W/ 637 OVERRIDE(OP): Performed by: HOSPITALIST

## 2019-12-03 PROCEDURE — 85027 COMPLETE CBC AUTOMATED: CPT

## 2019-12-03 PROCEDURE — 770006 HCHG ROOM/CARE - MED/SURG/GYN SEMI*

## 2019-12-03 PROCEDURE — A9270 NON-COVERED ITEM OR SERVICE: HCPCS | Performed by: HOSPITALIST

## 2019-12-03 PROCEDURE — 700111 HCHG RX REV CODE 636 W/ 250 OVERRIDE (IP): Performed by: HOSPITALIST

## 2019-12-03 PROCEDURE — 36415 COLL VENOUS BLD VENIPUNCTURE: CPT

## 2019-12-03 PROCEDURE — 99233 SBSQ HOSP IP/OBS HIGH 50: CPT | Performed by: HOSPITALIST

## 2019-12-03 PROCEDURE — 80048 BASIC METABOLIC PNL TOTAL CA: CPT

## 2019-12-03 PROCEDURE — 85007 BL SMEAR W/DIFF WBC COUNT: CPT

## 2019-12-03 PROCEDURE — 99255 IP/OBS CONSLTJ NEW/EST HI 80: CPT | Performed by: INTERNAL MEDICINE

## 2019-12-03 RX ADMIN — POLYETHYLENE GLYCOL 3350 1 PACKET: 17 POWDER, FOR SOLUTION ORAL at 17:56

## 2019-12-03 RX ADMIN — HYDROCHLOROTHIAZIDE 25 MG: 25 TABLET ORAL at 05:59

## 2019-12-03 RX ADMIN — KETOROLAC TROMETHAMINE 30 MG: 30 INJECTION, SOLUTION INTRAMUSCULAR at 20:38

## 2019-12-03 RX ADMIN — SENNOSIDES AND DOCUSATE SODIUM 2 TABLET: 8.6; 5 TABLET ORAL at 17:56

## 2019-12-03 RX ADMIN — LISINOPRIL 10 MG: 10 TABLET ORAL at 05:59

## 2019-12-03 RX ADMIN — OXYCODONE HYDROCHLORIDE 5 MG: 5 TABLET ORAL at 20:38

## 2019-12-03 RX ADMIN — SENNOSIDES AND DOCUSATE SODIUM 2 TABLET: 8.6; 5 TABLET ORAL at 05:59

## 2019-12-03 RX ADMIN — METHOTREXATE 10 MG: 2.5 TABLET ORAL at 11:56

## 2019-12-03 ASSESSMENT — ENCOUNTER SYMPTOMS
DIARRHEA: 0
MYALGIAS: 1
SHORTNESS OF BREATH: 0
DIAPHORESIS: 0
NAUSEA: 0
HEADACHES: 0
WEIGHT LOSS: 0
FEVER: 0
CHILLS: 0
PALPITATIONS: 0
COUGH: 0
DIZZINESS: 0
VOMITING: 0
ABDOMINAL PAIN: 0

## 2019-12-03 NOTE — PROGRESS NOTES
"Hospital Medicine Daily Progress Note    Date of Service  12/3/2019    Chief Complaint  44 y.o. female admitted 12/1/2019 with cellulitis of the left ankle with hardware present     Hospital Course    \"Annalise Woodard is a 44 y.o. female with history of prior ankle fracture status post surgical repair with hardware, systemic lupus erythematosus which is controlled, and essential hypertension on medical regimen, was in her usual state of health which includes regular pain in her ankle which occurs at the end of the day and is improved by awakening, noted something different on the day of admission.  She awoke with ankle pain, and a swelling at the surgical site, which seemed to increase throughout the day instead of improving.  She subsequently presented to the emergency department for further evaluation.  She has no other complaints, including chest pain, shortness of breath, abdominal pain, nausea vomiting, diarrhea or constipation.  No reported fever or chills.\"- Copied from H&P by Dr. Merlos.       Interval Problem Update  L-ankle pain and swelling decreased from yesterday. Full ankle ROM. Denies N/V, diarrhea, chills or diaphoresis.  Plan for I&D tomorrow by Ortho     Consultants/Specialty  Ortho surgery   Infectious Disease     Code Status  Full     Disposition  Continue inpatient pending I&D and post-op cultures    Review of Systems  Review of Systems   Constitutional: Negative for chills, diaphoresis, fever, malaise/fatigue and weight loss.   Respiratory: Negative for cough and shortness of breath.    Cardiovascular: Negative for chest pain and palpitations.   Gastrointestinal: Negative for abdominal pain, nausea and vomiting.   Musculoskeletal: Positive for joint pain and myalgias.   All other systems reviewed and are negative.       Physical Exam  Temp:  [36 °C (96.8 °F)-37.4 °C (99.3 °F)] 36 °C (96.8 °F)  Pulse:  [72-87] 72  Resp:  [16-17] 16  BP: (127-147)/() 127/70  SpO2:  [92 %-98 %] 92 " %    Physical Exam  Vitals signs and nursing note reviewed.   Constitutional:       General: She is not in acute distress.     Appearance: She is well-developed.   HENT:      Head: Normocephalic and atraumatic.      Mouth/Throat:      Pharynx: No oropharyngeal exudate.   Eyes:      General: No scleral icterus.     Pupils: Pupils are equal, round, and reactive to light.   Neck:      Musculoskeletal: Normal range of motion and neck supple.      Thyroid: No thyromegaly.   Cardiovascular:      Rate and Rhythm: Normal rate and regular rhythm.      Heart sounds: Normal heart sounds. No murmur.   Pulmonary:      Effort: Pulmonary effort is normal. No respiratory distress.      Breath sounds: Normal breath sounds. No wheezing.   Abdominal:      General: Bowel sounds are normal. There is no distension.      Palpations: Abdomen is soft.      Tenderness: There is no tenderness.   Musculoskeletal: Normal range of motion.         General: Swelling, tenderness and deformity present.      Left lower leg: Edema present.      Comments: LLE, proximal part of the incision with protrusion, no apparent fluid/secretions can be appreciated at this time   Skin:     General: Skin is warm and dry.      Findings: No rash.   Neurological:      Mental Status: She is alert and oriented to person, place, and time.      Cranial Nerves: No cranial nerve deficit.           Fluids    Intake/Output Summary (Last 24 hours) at 12/3/2019 1346  Last data filed at 12/3/2019 0900  Gross per 24 hour   Intake 600 ml   Output --   Net 600 ml       Laboratory  Recent Labs     12/01/19 2025 12/02/19  0353 12/03/19  0523   WBC 7.7 6.2 4.8   RBC 4.36 4.11* 4.28   HEMOGLOBIN 11.3* 10.5* 11.0*   HEMATOCRIT 36.1* 33.9* 35.5*   MCV 82.8 82.5 82.9   MCH 25.9* 25.5* 25.7*   MCHC 31.3* 31.0* 31.0*   RDW 50.7* 50.8* 51.2*   PLATELETCT 265 237 231   MPV 9.6 9.2 9.2     Recent Labs     12/01/19 2025 12/02/19  0353 12/03/19  0523   SODIUM 138 137 136   POTASSIUM 3.6 3.7  3.9   CHLORIDE 104 106 105   CO2 26 22 22   GLUCOSE 107* 96 87   BUN 15 13 12   CREATININE 0.82 0.66 0.70   CALCIUM 9.1 8.2* 8.3*                   Imaging  NM-BONE SCAN(LIMITED)/FLOW   Final Result      1.  There is increased activity on all 3 phases in the left ankle/distal tibial region .   2.  While this could represent osteomyelitis, the fact that there is fracture in this region based on x-rays, this could also simply represent cellulitis with bony remodeling causing the uptake on the delayed imaging.      US-EXTREMITY NON VASCULAR UNILATERAL LEFT   Final Result      Diffuse edema which could indicate cellulitis. No abscess visualized.      DX-ANKLE 3+ VIEWS LEFT   Final Result   Addendum 1 of 1   Additional clinical history: Infection   In light of the above clinical history there is some irregularity in the    lateral malleolus cortex. Osteomyelitis is not excluded in this region.    Further assessment could be performed with bone scan.      Findings were discussed with DANIEL RUIZ II on 12/1/2019 10:04 PM.      Final           Assessment/Plan  * Cellulitis of left lower extremity- (present on admission)  Assessment & Plan  Left ankle, in setting of prior hardware for complicated fracture.  Concern by ultrasound for underlying osteomyelitis.    - bone scan indeterminate   - ortho and ID following  - holding abx's as clinically stable at this time, will start abx therapy per ID recommendations     Rheumatoid arthritis (HCC)- (present on admission)  Assessment & Plan  - On methotrexate/plaquenil regimen and PRN NSAID therapy.  Stable.       Essential hypertension- (present on admission)  Assessment & Plan  - Controlled with current medication regimen which will be continued.  Monitor.      Other forms of systemic lupus erythematosus (HCC)- (present on admission)  Assessment & Plan  - On methotrexate therapy.  Monitor.        VTE prophylaxis: SCD's, holding anticoagulation in anticipation for I&D  tomorrow    I have performed a physical exam and reviewed and updated ROS and Plan today (12/3/2019). In review of yesterday's note (12/2/2019), there are no changes except as documented above.

## 2019-12-03 NOTE — CARE PLAN
Problem: Communication  Goal: The ability to communicate needs accurately and effectively will improve  Intervention: Educate patient and significant other/support system about the plan of care, procedures, treatments, medications and allow for questions  Note:   Pt updated on POC. All questions and concerns addressed at this time.      Problem: Safety  Goal: Will remain free from injury  Intervention: Educate patient and significant other/support system about adaptive mobility strategies and safe transfers  Note:   Pt educated to call staff if needing assistance. Pt is up by self. Pt has a steady gait. Bed locked in lowest position. Call light within reach.

## 2019-12-03 NOTE — CARE PLAN
Problem: Communication  Goal: The ability to communicate needs accurately and effectively will improve  Outcome: PROGRESSING AS EXPECTED   Participation in POC  Problem: Safety  Goal: Will remain free from injury  Outcome: PROGRESSING AS EXPECTED   Fall precautions in place

## 2019-12-03 NOTE — CARE PLAN
Problem: Infection  Goal: Will remain free from infection  Outcome: PROGRESSING AS EXPECTED  Potential ID consult to r/o osteomyelitis    Problem: Pain Management  Goal: Pain level will decrease to patient's comfort goal  Outcome: PROGRESSING AS EXPECTED   Denies pain, but c/o stiffness. Pt's home methotrexate ordered

## 2019-12-03 NOTE — PROGRESS NOTES
Bedside report received.  Assessment complete.  A&O x 4. Patient calls appropriately.  Patient up by self.    Patient has 0/10 pain. Pt declines intervention at this time.   Denies N&V. Tolerating regular diet.  Old surgical scar, swelling noted to LLE ankle.  + void, + flatus, + BM.  Patient denies SOB.        Review plan with of care with patient. Call light and personal belongings with in reach. Hourly rounding in place. All needs met at this time.

## 2019-12-03 NOTE — PROGRESS NOTES
Received bedside report from NUVIA Harrison  Pt is AAOx4  HAMILTON  VSS  Denies pain. C/O stiffness of joints. Methotrexate ordered  Denies SOB  -N/V; tolerating regular diet  +BS +Flatus. Last BM 12/1  -N/T  Pt has swelling to LLE; old surgical incision  Pt up self w/ steady gait  POC discussed  Bed locked and in the lowest position  Call light w/in reach

## 2019-12-03 NOTE — PROGRESS NOTES
Assumed care at 0700    Received report from NOC shift RN.    Reviewed recent lab results, notes, orders, and MAR  POC discussed and updated on care board  Bed is in the lowest and locked position, call light within reach       Patient A&Ox4  RA  Denies pain  Tolerating diet  Up self  Patient's plan is to have bone scan today.

## 2019-12-03 NOTE — CONSULTS
"INFECTIOUS DISEASES INPATIENT CONSULT NOTE     Date of Service: 12/3/2019    Consult Requested By: KALLIE Navarro    Reason for Consultation: Left ankle cellulitis    History of Present Illness:   Annalise Woodard is a 44 y.o. woman with a history of rheumatoid arthritis and SLE on methotrexate and Plaquenil, hypertension and a history of a left ankle fracture in April 2019 status post ORIF on 4/8/2019 by Dr. Gildardo Gomez followed by 1 screw removal in August 2019 admitted 12/1/2019 for acute on chronic swelling, pain and erythema.  Patient was in her usual state of health until approximately 2 weeks ago when she was out of town and admits to more walking than usual.  Typically at the end of the day after being on her feet, she experiences more left ankle pain and swelling.  Approximately 2 weeks prior to admission, she followed up with her surgeon at the [facility] secondary to worsening swelling.  X-rays at that time were reportedly normal.  On 12/1, she woke up and noticed a tender lump on the lateral aspect of her ankle surrounded by erythema.  She denied any associated fevers or chills.  She has been able to flex and extend around her ankle joint.  Later that day, she presented to the urgent care center who recommended she go to the ED for further evaluation and management due to concerns for infection.  On presentation, she was afebrile without any leukocytosis.  X-ray showed hardware in place.  Bone scan was indeterminant and showed increased activity of all 3 phases in the left ankle/distal tibial region.  She received a dose of ampicillin-sulbactam and vancomycin however these antibiotics were not continued.  At this time, she states that the erythema and \"lump\" have improved.  ESR is 36.  She was evaluated by orthopedic surgery with plans for an I&D of the ankle and hardware removal tomorrow.  Infectious disease service consulted for further recommendations and management.      Review of " Systems   Constitutional: Negative for chills and fever.   Respiratory: Negative for cough and shortness of breath.    Gastrointestinal: Negative for abdominal pain, diarrhea, nausea and vomiting.   Genitourinary: Negative for dysuria.   Musculoskeletal: Positive for joint pain.        Arthralgic pain in hand secondary to rheumatoid arthritis   Neurological: Negative for dizziness and headaches.   All other systems reviewed and are negative.      PMH:   Past Medical History:   Diagnosis Date   • ASTHMA    • Hypertension    • Lupus (HCC)    • Rheumatoid arthritis (HCC)        PSH:  Past Surgical History:   Procedure Laterality Date   • ANKLE ORIF Left 4/8/2019    Procedure: ORIF, ANKLE;  Surgeon: Gildardo Padron M.D.;  Location: SURGERY [facility];  Service: Orthopedics       FAMILY HX:  Family History   Problem Relation Age of Onset   • Heart Attack Mother        SOCIAL HX:  Social History     Socioeconomic History   • Marital status: Single     Spouse name: Not on file   • Number of children: Not on file   • Years of education: Not on file   • Highest education level: Not on file   Occupational History   • Not on file   Social Needs   • Financial resource strain: Not on file   • Food insecurity:     Worry: Not on file     Inability: Not on file   • Transportation needs:     Medical: Not on file     Non-medical: Not on file   Tobacco Use   • Smoking status: Never Smoker   • Smokeless tobacco: Never Used   Substance and Sexual Activity   • Alcohol use: Yes     Frequency: 2-4 times a month     Drinks per session: 1 or 2     Binge frequency: Never   • Drug use: No   • Sexual activity: Not on file   Lifestyle   • Physical activity:     Days per week: Not on file     Minutes per session: Not on file   • Stress: Not on file   Relationships   • Social connections:     Talks on phone: Not on file     Gets together: Not on file     Attends Voodoo service: Not on file     Active member of club or organization:  Not on file     Attends meetings of clubs or organizations: Not on file     Relationship status: Not on file   • Intimate partner violence:     Fear of current or ex partner: Not on file     Emotionally abused: Not on file     Physically abused: Not on file     Forced sexual activity: Not on file   Other Topics Concern   • Not on file   Social History Narrative   • Not on file     Social History     Tobacco Use   Smoking Status Never Smoker   Smokeless Tobacco Never Used     Social History     Substance and Sexual Activity   Alcohol Use Yes   • Frequency: 2-4 times a month   • Drinks per session: 1 or 2   • Binge frequency: Never   Denies any IV drug use or methamphetamine use    Allergies/Intolerances:  No Known Allergies    History reviewed with the patient     Other Current Medications:    Current Facility-Administered Medications:   •  [START ON 12/9/2019] methotrexate tablet 10 mg, 10 mg, Oral, Q MONDAY, MAGUE YorkRMARY ANN  •  lisinopril (PRINIVIL) 10 MG tablet 10 mg, 10 mg, Oral, Q DAY, Eris Merlos M.D., 10 mg at 12/03/19 0559  •  hydroCHLOROthiazide (HYDRODIURIL) tablet 25 mg, 25 mg, Oral, DAILY, Eris Merlos M.D., 25 mg at 12/03/19 0559  •  acetaminophen (TYLENOL) tablet 650 mg, 650 mg, Oral, Q6HRS PRN, Eris Merlos M.D.  •  Notify provider if pain remains uncontrolled, , , CONTINUOUS **AND** Use the numeric rating scale (NRS-11) on regular floors and Critical-Care Pain Observation Tool (CPOT) on ICUs/Trauma to assess pain, , , CONTINUOUS **AND** Pulse Ox (Oximetry), , , CONTINUOUS **AND** Pharmacy Consult Request ...Pain Management Review 1 Each, 1 Each, Other, PHARMACY TO DOSE **AND** If patient difficult to arouse and/or has respiratory depression, stop any opiates that are currently infusing and call a Rapid Response., , , CONTINUOUS **AND** oxyCODONE immediate-release (ROXICODONE) tablet 2.5 mg, 2.5 mg, Oral, Q3HRS PRN **AND** oxyCODONE immediate-release (ROXICODONE) tablet 5 mg, 5  "mg, Oral, Q3HRS PRN, 5 mg at 19 1821 **AND** morphine (pf) 4 MG/ML injection 2 mg, 2 mg, Intravenous, Q3HRS PRN, Eris Merlos M.D.  •  cloNIDine (CATAPRES) tablet 0.1 mg, 0.1 mg, Oral, Q6HRS PRN, Eris Merlos M.D.  •  ondansetron (ZOFRAN) syringe/vial injection 4 mg, 4 mg, Intravenous, Q4HRS PRN, Eris Merlos M.D.  •  ondansetron (ZOFRAN ODT) dispertab 4 mg, 4 mg, Oral, Q4HRS PRN, Eris Merlos M.D.  •  promethazine (PHENERGAN) tablet 12.5-25 mg, 12.5-25 mg, Oral, Q4HRS PRN, Eris Merlos M.D.  •  promethazine (PHENERGAN) suppository 12.5-25 mg, 12.5-25 mg, Rectal, Q4HRS PRN, Eris Merlos M.D.  •  prochlorperazine (COMPAZINE) injection 5-10 mg, 5-10 mg, Intravenous, Q4HRS PRN, Eris Merlos M.D.  •  senna-docusate (PERICOLACE or SENOKOT S) 8.6-50 MG per tablet 2 Tab, 2 Tab, Oral, BID, 2 Tab at 19 0559 **AND** polyethylene glycol/lytes (MIRALAX) PACKET 1 Packet, 1 Packet, Oral, QDAY PRN **AND** magnesium hydroxide (MILK OF MAGNESIA) suspension 30 mL, 30 mL, Oral, QDAY PRN **AND** bisacodyl (DULCOLAX) suppository 10 mg, 10 mg, Rectal, QDAY PRN, Eris Merlos M.D.  •  enoxaparin (LOVENOX) inj 40 mg, 40 mg, Subcutaneous, DAILY, Eris Merlos M.D., 40 mg at 19 0536  •  ketorolac (TORADOL) injection 30 mg, 30 mg, Intravenous, Q6HRS PRN, Eris Merlos M.D., 30 mg at 19 1821  [unfilled]    Most Recent Vital Signs:  /70   Pulse 72   Temp 36 °C (96.8 °F) (Temporal)   Resp 16   Ht 1.715 m (5' 7.5\")   Wt 110.1 kg (242 lb 11.6 oz)   LMP 2019 (Approximate)   SpO2 92%   Breastfeeding? No   BMI 37.46 kg/m²   Temp  Av.9 °C (98.4 °F)  Min: 36 °C (96.8 °F)  Max: 37.4 °C (99.3 °F)    Physical Exam:  General: well nourished, no diaphoresis, well-appearing, no acute distress  HEENT: sclera anicteric, PERRL, extraocular muscles intact, mucous membranes moist, oropharynx clear and moist, no oral lesions or exudate  Neck: supple, no lymphadenopathy  Chest: " CTAB, no rales, rhonchi or wheezes, normal work of breathing.  Cardiac: regular rate and rhythm, normal S1 S2, no murmurs, rubs or gallops  Abdomen: + bowel sounds, soft, non-tender, non-distended, no hepatosplenomegaly  Extremities: WWP, 2+ pedal pulses.  Surgical site on the lateral aspect of the left ankle and distal tib is well-healed.  There are no open lesions.  There is a palpable lump on the distal tib region that is tender to palpation but there is no erythema.  Skin: warm and dry, no rashes or worrisome lesions  Neuro: Alert and oriented times 3, non-focal exam, speech fluent, full range of motion to bilateral upper and lower extremities  Psych: normal mood and behavior, pleasant; memory intact, normal judgement    Pertinent Lab Results:  Recent Labs     12/01/19 2025 12/02/19 0353 12/03/19 0523   WBC 7.7 6.2 4.8      Recent Labs     12/01/19 2025 12/02/19 0353 12/03/19 0523   HEMOGLOBIN 11.3* 10.5* 11.0*   HEMATOCRIT 36.1* 33.9* 35.5*   MCV 82.8 82.5 82.9   MCH 25.9* 25.5* 25.7*   PLATELETCT 265 237 231         Recent Labs     12/01/19 2025 12/02/19 0353 12/03/19 0523   SODIUM 138 137 136   POTASSIUM 3.6 3.7 3.9   CHLORIDE 104 106 105   CO2 26 22 22   CREATININE 0.82 0.66 0.70        Recent Labs     12/01/19 2025   ALBUMIN 4.0        Pertinent Micro:  Results     ** No results found for the last 168 hours. **        No results found for: BLOODCULTU, BLDCULT, BCHOLD     Studies:  Dx-ankle 3+ Views Left    Addendum Date: 12/1/2019    Additional clinical history: Infection In light of the above clinical history there is some irregularity in the lateral malleolus cortex. Osteomyelitis is not excluded in this region. Further assessment could be performed with bone scan. Findings were discussed with DANIEL RUIZ II on 12/1/2019 10:04 PM.    Result Date: 12/1/2019 12/1/2019 8:13 PM HISTORY/REASON FOR EXAM:  Pain/Deformity Following Trauma; prior left ankle fracture. TECHNIQUE/EXAM DESCRIPTION  AND NUMBER OF VIEWS:  3 views of the  LEFT ankle. COMPARISON: 4/8/2019 FINDINGS: MINERALIZATION: Mineralization is unremarkable for age. INJURY: There has been interval open reduction and internal fixation of the medial and lateral malleolar fractures. There is curvilinear deformity of the underlying bony structures. There is indistinctly visualized lucency extending through the posterior  malleolus. This is similar to the prior study and partially obscured by overlying hardware. There is no evidence of hardware loosening or failure. JOINTS: No erosive arthropathy is evident.  Ankle mortise is symmetric. Distal tibial fibular syndesmosis appears widened.     1.  Interval open reduction and internal fixation of lateral and medial malleoli fractures 2.  Indistinctly visualized posterior malleolus fracture, likely indicating some amount of incomplete bony fusion of the previously demonstrated posterior malleolar fracture site, though interval re-injury is not excluded 3.  Widened distal tibiofibular syndesmosis, similar to prior    Nm-bone Scan(limited)/flow    Result Date: 12/2/2019 12/2/2019 1:08 PM HISTORY/REASON FOR EXAM:  Foot pain, initial exam; Left ankle cellulitis with concern for osteomyelitis TECHNIQUE/EXAM DESCRIPTION AND NUMBER OF VIEWS:  26.2 mCi Tc 99m-MDP was administered intravenously followed by three-phase imaging of the ankles and feet. COMPARISON:  X-ray 12/1/2019 FINDINGS:     The blood pool images demonstrate increased flow to the left ankle. The blood pool images demonstrate increased activity in the left ankle. The delayed images demonstrate increased activity in the left ankle the region of the distal tibia.     1.  There is increased activity on all 3 phases in the left ankle/distal tibial region . 2.  While this could represent osteomyelitis, the fact that there is fracture in this region based on x-rays, this could also simply represent cellulitis with bony remodeling causing the uptake on  the delayed imaging.    Us-extremity Non Vascular Unilateral Left    Result Date: 12/1/2019 12/1/2019 9:29 PM HISTORY/REASON FOR EXAM:  Leukopenia, swelling over distal lateral LEFT leg at site of prior OriF TECHNIQUE/EXAM DESCRIPTION AND NUMBER OF VIEWS: The distal LEFT lateral leg was scanned with grayscale. Selected images were submitted for radiologist review. COMPARISON: None FINDINGS: There is diffuse edema in the soft tissues of the region of interest. No discrete fluid collection is seen.     Diffuse edema which could indicate cellulitis. No abscess visualized.      IMPRESSION:   1.  Left ankle cellulitis    2.  Left ankle hardware infection  3.  Rheumatoid arthritis  4.  SLE      PLAN:   Annalise Woodard is a 44 y.o. woman with a history of rheumatoid arthritis and SLE on Plaquenil and methotrexate, and a history of a left ankle fracture in April status post ORIF followed by one screw removal in August 2019 admitted for left ankle swelling, erythema and worsening pain.  Bone scan was indeterminant.  According to the patient, the erythema on her left ankle and lump have improved.  She likely had cellulitis given her history and clinical presentation however there is concern for underlying hardware infection.  She is otherwise clinically stable without any fevers or leukocytosis so okay to continue holding off on antibiotics.  She is scheduled to have surgery tomorrow with an I&D and possible hardware removal.  Will follow operative cultures.  Anticipate the patient will need a PICC line for 6 weeks of IV antibiotics for hardware infection.  Further recommendations per hospital course and culture results.    Plan of care discussed with ALLIE ARREGUIN.. Will continue to follow    Marleny Moss M.D.      Please note that this dictation was created using voice recognition software. I have worked with technical experts from [employer] to optimize the interface.  I have made every reasonable  attempt to correct obvious errors, but there may be errors of grammar and possibly content that I did not discover before finalizing the note.

## 2019-12-04 ENCOUNTER — ANESTHESIA EVENT (OUTPATIENT)
Dept: SURGERY | Facility: MEDICAL CENTER | Age: 44
DRG: 493 | End: 2019-12-04
Payer: COMMERCIAL

## 2019-12-04 ENCOUNTER — ANESTHESIA (OUTPATIENT)
Dept: SURGERY | Facility: MEDICAL CENTER | Age: 44
DRG: 493 | End: 2019-12-04
Payer: COMMERCIAL

## 2019-12-04 ENCOUNTER — APPOINTMENT (OUTPATIENT)
Dept: RADIOLOGY | Facility: MEDICAL CENTER | Age: 44
DRG: 493 | End: 2019-12-04
Attending: ORTHOPAEDIC SURGERY
Payer: COMMERCIAL

## 2019-12-04 LAB
ANION GAP SERPL CALC-SCNC: 7 MMOL/L (ref 0–11.9)
BUN SERPL-MCNC: 14 MG/DL (ref 8–22)
CALCIUM SERPL-MCNC: 8.6 MG/DL (ref 8.5–10.5)
CHLORIDE SERPL-SCNC: 105 MMOL/L (ref 96–112)
CO2 SERPL-SCNC: 26 MMOL/L (ref 20–33)
CREAT SERPL-MCNC: 0.84 MG/DL (ref 0.5–1.4)
ERYTHROCYTE [DISTWIDTH] IN BLOOD BY AUTOMATED COUNT: 49.5 FL (ref 35.9–50)
GLUCOSE SERPL-MCNC: 96 MG/DL (ref 65–99)
GRAM STN SPEC: NORMAL
HCG SERPL QL: NEGATIVE
HCT VFR BLD AUTO: 35 % (ref 37–47)
HGB BLD-MCNC: 11 G/DL (ref 12–16)
MCH RBC QN AUTO: 25.9 PG (ref 27–33)
MCHC RBC AUTO-ENTMCNC: 31.4 G/DL (ref 33.6–35)
MCV RBC AUTO: 82.4 FL (ref 81.4–97.8)
PLATELET # BLD AUTO: 248 K/UL (ref 164–446)
PMV BLD AUTO: 8.9 FL (ref 9–12.9)
POTASSIUM SERPL-SCNC: 4.1 MMOL/L (ref 3.6–5.5)
RBC # BLD AUTO: 4.25 M/UL (ref 4.2–5.4)
SIGNIFICANT IND 70042: NORMAL
SITE SITE: NORMAL
SODIUM SERPL-SCNC: 138 MMOL/L (ref 135–145)
SOURCE SOURCE: NORMAL
WBC # BLD AUTO: 5.1 K/UL (ref 4.8–10.8)

## 2019-12-04 PROCEDURE — 700102 HCHG RX REV CODE 250 W/ 637 OVERRIDE(OP): Performed by: HOSPITALIST

## 2019-12-04 PROCEDURE — 500881 HCHG PACK, EXTREMITY: Performed by: ORTHOPAEDIC SURGERY

## 2019-12-04 PROCEDURE — 160028 HCHG SURGERY MINUTES - 1ST 30 MINS LEVEL 3: Performed by: ORTHOPAEDIC SURGERY

## 2019-12-04 PROCEDURE — 700105 HCHG RX REV CODE 258: Performed by: HOSPITALIST

## 2019-12-04 PROCEDURE — 700105 HCHG RX REV CODE 258: Performed by: ANESTHESIOLOGY

## 2019-12-04 PROCEDURE — 501838 HCHG SUTURE GENERAL: Performed by: ORTHOPAEDIC SURGERY

## 2019-12-04 PROCEDURE — 87205 SMEAR GRAM STAIN: CPT

## 2019-12-04 PROCEDURE — 501445 HCHG STAPLER, SKIN DISP: Performed by: ORTHOPAEDIC SURGERY

## 2019-12-04 PROCEDURE — 80048 BASIC METABOLIC PNL TOTAL CA: CPT

## 2019-12-04 PROCEDURE — 99233 SBSQ HOSP IP/OBS HIGH 50: CPT | Performed by: INTERNAL MEDICINE

## 2019-12-04 PROCEDURE — 87070 CULTURE OTHR SPECIMN AEROBIC: CPT

## 2019-12-04 PROCEDURE — 160002 HCHG RECOVERY MINUTES (STAT): Performed by: ORTHOPAEDIC SURGERY

## 2019-12-04 PROCEDURE — 99232 SBSQ HOSP IP/OBS MODERATE 35: CPT | Performed by: HOSPITALIST

## 2019-12-04 PROCEDURE — A6222 GAUZE <=16 IN NO W/SAL W/O B: HCPCS | Performed by: ORTHOPAEDIC SURGERY

## 2019-12-04 PROCEDURE — 160039 HCHG SURGERY MINUTES - EA ADDL 1 MIN LEVEL 3: Performed by: ORTHOPAEDIC SURGERY

## 2019-12-04 PROCEDURE — A9270 NON-COVERED ITEM OR SERVICE: HCPCS | Performed by: HOSPITALIST

## 2019-12-04 PROCEDURE — 770006 HCHG ROOM/CARE - MED/SURG/GYN SEMI*

## 2019-12-04 PROCEDURE — 700105 HCHG RX REV CODE 258

## 2019-12-04 PROCEDURE — 160048 HCHG OR STATISTICAL LEVEL 1-5: Performed by: ORTHOPAEDIC SURGERY

## 2019-12-04 PROCEDURE — 700111 HCHG RX REV CODE 636 W/ 250 OVERRIDE (IP): Performed by: NURSE PRACTITIONER

## 2019-12-04 PROCEDURE — 0QBK0ZZ EXCISION OF LEFT FIBULA, OPEN APPROACH: ICD-10-PCS | Performed by: ORTHOPAEDIC SURGERY

## 2019-12-04 PROCEDURE — 160035 HCHG PACU - 1ST 60 MINS PHASE I: Performed by: ORTHOPAEDIC SURGERY

## 2019-12-04 PROCEDURE — 160009 HCHG ANES TIME/MIN: Performed by: ORTHOPAEDIC SURGERY

## 2019-12-04 PROCEDURE — 0SPG04Z REMOVAL OF INTERNAL FIXATION DEVICE FROM LEFT ANKLE JOINT, OPEN APPROACH: ICD-10-PCS | Performed by: ORTHOPAEDIC SURGERY

## 2019-12-04 PROCEDURE — 700111 HCHG RX REV CODE 636 W/ 250 OVERRIDE (IP): Performed by: ANESTHESIOLOGY

## 2019-12-04 PROCEDURE — 700101 HCHG RX REV CODE 250: Performed by: ANESTHESIOLOGY

## 2019-12-04 PROCEDURE — 85027 COMPLETE CBC AUTOMATED: CPT

## 2019-12-04 PROCEDURE — 36415 COLL VENOUS BLD VENIPUNCTURE: CPT

## 2019-12-04 PROCEDURE — 700111 HCHG RX REV CODE 636 W/ 250 OVERRIDE (IP): Performed by: HOSPITALIST

## 2019-12-04 PROCEDURE — 84703 CHORIONIC GONADOTROPIN ASSAY: CPT

## 2019-12-04 PROCEDURE — 87075 CULTR BACTERIA EXCEPT BLOOD: CPT

## 2019-12-04 PROCEDURE — 700105 HCHG RX REV CODE 258: Performed by: NURSE PRACTITIONER

## 2019-12-04 PROCEDURE — 160036 HCHG PACU - EA ADDL 30 MINS PHASE I: Performed by: ORTHOPAEDIC SURGERY

## 2019-12-04 RX ORDER — ONDANSETRON 2 MG/ML
4 INJECTION INTRAMUSCULAR; INTRAVENOUS
Status: DISCONTINUED | OUTPATIENT
Start: 2019-12-04 | End: 2019-12-04 | Stop reason: HOSPADM

## 2019-12-04 RX ORDER — HYDROMORPHONE HYDROCHLORIDE 1 MG/ML
0.2 INJECTION, SOLUTION INTRAMUSCULAR; INTRAVENOUS; SUBCUTANEOUS
Status: DISCONTINUED | OUTPATIENT
Start: 2019-12-04 | End: 2019-12-04 | Stop reason: HOSPADM

## 2019-12-04 RX ORDER — HYDROCODONE BITARTRATE AND ACETAMINOPHEN 5; 325 MG/1; MG/1
1-2 TABLET ORAL EVERY 4 HOURS PRN
Qty: 30 TAB | Refills: 0 | Status: SHIPPED | OUTPATIENT
Start: 2019-12-04 | End: 2019-12-11

## 2019-12-04 RX ORDER — ONDANSETRON 2 MG/ML
INJECTION INTRAMUSCULAR; INTRAVENOUS PRN
Status: DISCONTINUED | OUTPATIENT
Start: 2019-12-04 | End: 2019-12-04 | Stop reason: SURG

## 2019-12-04 RX ORDER — HYDROMORPHONE HYDROCHLORIDE 1 MG/ML
0.4 INJECTION, SOLUTION INTRAMUSCULAR; INTRAVENOUS; SUBCUTANEOUS
Status: DISCONTINUED | OUTPATIENT
Start: 2019-12-04 | End: 2019-12-04 | Stop reason: HOSPADM

## 2019-12-04 RX ORDER — OXYCODONE HYDROCHLORIDE AND ACETAMINOPHEN 5; 325 MG/1; MG/1
2 TABLET ORAL
Status: DISCONTINUED | OUTPATIENT
Start: 2019-12-04 | End: 2019-12-04 | Stop reason: HOSPADM

## 2019-12-04 RX ORDER — HYDRALAZINE HYDROCHLORIDE 20 MG/ML
5 INJECTION INTRAMUSCULAR; INTRAVENOUS
Status: DISCONTINUED | OUTPATIENT
Start: 2019-12-04 | End: 2019-12-04 | Stop reason: HOSPADM

## 2019-12-04 RX ORDER — SODIUM CHLORIDE, SODIUM LACTATE, POTASSIUM CHLORIDE, CALCIUM CHLORIDE 600; 310; 30; 20 MG/100ML; MG/100ML; MG/100ML; MG/100ML
INJECTION, SOLUTION INTRAVENOUS CONTINUOUS
Status: DISCONTINUED | OUTPATIENT
Start: 2019-12-04 | End: 2019-12-04 | Stop reason: HOSPADM

## 2019-12-04 RX ORDER — CEFAZOLIN SODIUM 1 G/3ML
INJECTION, POWDER, FOR SOLUTION INTRAMUSCULAR; INTRAVENOUS PRN
Status: DISCONTINUED | OUTPATIENT
Start: 2019-12-04 | End: 2019-12-04 | Stop reason: SURG

## 2019-12-04 RX ORDER — DEXAMETHASONE SODIUM PHOSPHATE 4 MG/ML
INJECTION, SOLUTION INTRA-ARTICULAR; INTRALESIONAL; INTRAMUSCULAR; INTRAVENOUS; SOFT TISSUE PRN
Status: DISCONTINUED | OUTPATIENT
Start: 2019-12-04 | End: 2019-12-04 | Stop reason: SURG

## 2019-12-04 RX ORDER — DIPHENHYDRAMINE HYDROCHLORIDE 50 MG/ML
12.5 INJECTION INTRAMUSCULAR; INTRAVENOUS
Status: DISCONTINUED | OUTPATIENT
Start: 2019-12-04 | End: 2019-12-04 | Stop reason: HOSPADM

## 2019-12-04 RX ORDER — LABETALOL HYDROCHLORIDE 5 MG/ML
5 INJECTION, SOLUTION INTRAVENOUS
Status: DISCONTINUED | OUTPATIENT
Start: 2019-12-04 | End: 2019-12-04 | Stop reason: HOSPADM

## 2019-12-04 RX ORDER — HYDROMORPHONE HYDROCHLORIDE 1 MG/ML
0.1 INJECTION, SOLUTION INTRAMUSCULAR; INTRAVENOUS; SUBCUTANEOUS
Status: DISCONTINUED | OUTPATIENT
Start: 2019-12-04 | End: 2019-12-04 | Stop reason: HOSPADM

## 2019-12-04 RX ORDER — HALOPERIDOL 5 MG/ML
1 INJECTION INTRAMUSCULAR
Status: DISCONTINUED | OUTPATIENT
Start: 2019-12-04 | End: 2019-12-04 | Stop reason: HOSPADM

## 2019-12-04 RX ORDER — OXYCODONE HYDROCHLORIDE AND ACETAMINOPHEN 5; 325 MG/1; MG/1
1 TABLET ORAL
Status: DISCONTINUED | OUTPATIENT
Start: 2019-12-04 | End: 2019-12-04 | Stop reason: HOSPADM

## 2019-12-04 RX ORDER — SODIUM CHLORIDE 9 MG/ML
INJECTION, SOLUTION INTRAVENOUS
Status: COMPLETED
Start: 2019-12-04 | End: 2019-12-04

## 2019-12-04 RX ORDER — SODIUM CHLORIDE, SODIUM LACTATE, POTASSIUM CHLORIDE, CALCIUM CHLORIDE 600; 310; 30; 20 MG/100ML; MG/100ML; MG/100ML; MG/100ML
INJECTION, SOLUTION INTRAVENOUS
Status: DISCONTINUED | OUTPATIENT
Start: 2019-12-04 | End: 2019-12-04 | Stop reason: SURG

## 2019-12-04 RX ORDER — MEPERIDINE HYDROCHLORIDE 25 MG/ML
6.25 INJECTION INTRAMUSCULAR; INTRAVENOUS; SUBCUTANEOUS
Status: DISCONTINUED | OUTPATIENT
Start: 2019-12-04 | End: 2019-12-04 | Stop reason: HOSPADM

## 2019-12-04 RX ORDER — MIDAZOLAM HYDROCHLORIDE 1 MG/ML
INJECTION INTRAMUSCULAR; INTRAVENOUS PRN
Status: DISCONTINUED | OUTPATIENT
Start: 2019-12-04 | End: 2019-12-04 | Stop reason: SURG

## 2019-12-04 RX ORDER — LIDOCAINE HYDROCHLORIDE 20 MG/ML
INJECTION, SOLUTION EPIDURAL; INFILTRATION; INTRACAUDAL; PERINEURAL PRN
Status: DISCONTINUED | OUTPATIENT
Start: 2019-12-04 | End: 2019-12-04 | Stop reason: SURG

## 2019-12-04 RX ORDER — KETOROLAC TROMETHAMINE 30 MG/ML
INJECTION, SOLUTION INTRAMUSCULAR; INTRAVENOUS PRN
Status: DISCONTINUED | OUTPATIENT
Start: 2019-12-04 | End: 2019-12-04 | Stop reason: SURG

## 2019-12-04 RX ADMIN — POLYETHYLENE GLYCOL 3350 1 PACKET: 17 POWDER, FOR SOLUTION ORAL at 16:59

## 2019-12-04 RX ADMIN — LIDOCAINE HYDROCHLORIDE 100 MG: 20 INJECTION, SOLUTION EPIDURAL; INFILTRATION; INTRACAUDAL at 08:12

## 2019-12-04 RX ADMIN — PROPOFOL 200 MG: 10 INJECTION, EMULSION INTRAVENOUS at 08:13

## 2019-12-04 RX ADMIN — CEFAZOLIN 2 G: 330 INJECTION, POWDER, FOR SOLUTION INTRAMUSCULAR; INTRAVENOUS at 08:27

## 2019-12-04 RX ADMIN — SODIUM CHLORIDE, POTASSIUM CHLORIDE, SODIUM LACTATE AND CALCIUM CHLORIDE: 600; 310; 30; 20 INJECTION, SOLUTION INTRAVENOUS at 08:09

## 2019-12-04 RX ADMIN — FENTANYL CITRATE 100 MCG: 50 INJECTION, SOLUTION INTRAMUSCULAR; INTRAVENOUS at 08:12

## 2019-12-04 RX ADMIN — CEFTRIAXONE SODIUM 2 G: 2 INJECTION, POWDER, FOR SOLUTION INTRAMUSCULAR; INTRAVENOUS at 12:33

## 2019-12-04 RX ADMIN — MIDAZOLAM 2 MG: 1 INJECTION INTRAMUSCULAR; INTRAVENOUS at 08:10

## 2019-12-04 RX ADMIN — ENOXAPARIN SODIUM 40 MG: 100 INJECTION SUBCUTANEOUS at 16:59

## 2019-12-04 RX ADMIN — SODIUM CHLORIDE 500 ML: 9 INJECTION, SOLUTION INTRAVENOUS at 14:00

## 2019-12-04 RX ADMIN — SENNOSIDES AND DOCUSATE SODIUM 2 TABLET: 8.6; 5 TABLET ORAL at 16:59

## 2019-12-04 RX ADMIN — DEXAMETHASONE SODIUM PHOSPHATE 4 MG: 4 INJECTION, SOLUTION INTRA-ARTICULAR; INTRALESIONAL; INTRAMUSCULAR; INTRAVENOUS; SOFT TISSUE at 08:18

## 2019-12-04 RX ADMIN — KETOROLAC TROMETHAMINE 30 MG: 30 INJECTION, SOLUTION INTRAMUSCULAR at 08:37

## 2019-12-04 RX ADMIN — VANCOMYCIN HYDROCHLORIDE 2800 MG: 500 INJECTION, POWDER, LYOPHILIZED, FOR SOLUTION INTRAVENOUS at 13:59

## 2019-12-04 RX ADMIN — HYDROCHLOROTHIAZIDE 25 MG: 25 TABLET ORAL at 10:56

## 2019-12-04 RX ADMIN — LISINOPRIL 10 MG: 10 TABLET ORAL at 10:56

## 2019-12-04 RX ADMIN — OXYCODONE HYDROCHLORIDE 5 MG: 5 TABLET ORAL at 20:06

## 2019-12-04 RX ADMIN — ONDANSETRON 4 MG: 2 INJECTION INTRAMUSCULAR; INTRAVENOUS at 08:37

## 2019-12-04 ASSESSMENT — ENCOUNTER SYMPTOMS
CHILLS: 0
ABDOMINAL PAIN: 0
WEIGHT LOSS: 0
DIAPHORESIS: 0
FEVER: 0
HEADACHES: 0
VOMITING: 0
SHORTNESS OF BREATH: 0
PALPITATIONS: 0
DIARRHEA: 0
COUGH: 0
NAUSEA: 0
DIZZINESS: 0

## 2019-12-04 NOTE — ANESTHESIA POSTPROCEDURE EVALUATION
Patient: Annalise Woodard    Procedure Summary     Date:  12/04/19 Room / Location:  [place] [address] / SURGERY [facility]    Anesthesia Start:  0809 Anesthesia Stop:  0849    Procedures:       HARDWARE REMOVAL ORTHO- ANKLE (Left Ankle)      IRRIGATION AND DEBRIDEMENT, WOUND (Left Ankle) Diagnosis:  (left ankle infection PENDING PATHOLOGY)    Surgeon:  Kali Pitts M.D. Responsible Provider:  Ihsan Benjamin M.D.    Anesthesia Type:  general ASA Status:  2 - Emergent          Final Anesthesia Type: general  Last vitals  BP   Blood Pressure: 136/88    Temp   36.4 °C (97.6 °F)    Pulse   Pulse: 78   Resp   16    SpO2   91 %      Anesthesia Post Evaluation    Patient location during evaluation: PACU  Patient participation: complete - patient participated  Level of consciousness: awake and alert    Airway patency: patent  Anesthetic complications: no  Cardiovascular status: hemodynamically stable  Respiratory status: acceptable  Hydration status: euvolemic    PONV: none           Nurse Pain Score: 0 (NPRS)

## 2019-12-04 NOTE — OP REPORT
DATE OF SERVICE:  12/04/2019    PREOPERATIVE DIAGNOSIS:  Infected left distal fibula, status post open   reduction and internal fixation ankle.    POSTOPERATIVE DIAGNOSIS:  Infected left distal fibula, status post open   reduction and internal fixation ankle.    PROCEDURE:  Hardware removal, left ankle and irrigation and debridement, skin,   subcutaneous tissue, muscle, and bone.    SURGEON:  Kali Barkley MD    ASSISTANT:  Vinnie Huitron PA-C    ESTIMATED BLOOD LOSS:  Minimal.    INDICATIONS:  This is a 44-year-old female who is 8 months status post ORIF of   the left ankle fracture who has had some recurrent cellulitis and pain and a   bone scan showing increased uptake in the distal fibula.  Risks and benefits   of hardware removal and irrigation and debridement were discussed, which   include but not limited to bleeding, infection, neurovascular damage, pain,   stiffness, malunion, nonunion, DVT, PE, MI, stroke, and death.  They   understand all these risks and wished to proceed.    DESCRIPTION OF PROCEDURE:  Patient was sedated with LMA anesthesia and   administered preoperative antibiotics.  Left leg was prepped in the usual   sterile fashion.  Previous incision was reopened.  Hardware was removed   without difficulty.  The wound was debrided of skin, subcutaneous tissue,   underlying muscle, and bone in excisional fashion with a knife and rongeur.    This was sent for culture and sensitivity.  Wounds were then irrigated, closed   in layers.  Sterile dressings were applied.  Patient tolerated the procedure   well.      POSTOPERATIVE PLAN:  Patient will be weightbear as tolerated, admitted for   perioperative antibiotics, DVT prophylaxis, infectious disease consultation   while awaiting definitive cultures.       ____________________________________     KALI BARKLEY MD    [facility] / NTS    DD:  12/04/2019 08:43:04  DT:  12/04/2019 09:12:21    D#:  [id number]  Job#:  [id number]

## 2019-12-04 NOTE — PROGRESS NOTES
"I have seen and examined the patient, all new labs and imaging studies were reviewed.  Case was discussed in detail with the APN and I agree with the assessment and plan as well as physical exam findings with the following additions:    S/p OR hardware removal per surgery, pain control  Needs long term IV abx- ID on board  Pending OR culture results  PICC ordered          Hospital Medicine Daily Progress Note    Date of Service  12/4/2019    Chief Complaint  44 y.o. female admitted 12/1/2019 with cellulitis of the left ankle with hardware present     Hospital Course    \"Annalise Woodard is a 44 y.o. female with history of prior ankle fracture status post surgical repair with hardware, systemic lupus erythematosus which is controlled, and essential hypertension on medical regimen, was in her usual state of health which includes regular pain in her ankle which occurs at the end of the day and is improved by awakening, noted something different on the day of admission.  She awoke with ankle pain, and a swelling at the surgical site, which seemed to increase throughout the day instead of improving.  She subsequently presented to the emergency department for further evaluation.  She has no other complaints, including chest pain, shortness of breath, abdominal pain, nausea vomiting, diarrhea or constipation.  No reported fever or chills.\"- Copied from H&P by Dr. Merlos.       Interval Problem Update  Status post I&D and hardware removal by Dr. Pitts. Dressing to L-ankle c/d/i. Reports pain well controlled. PICC ordered for long-term abx     Consultants/Specialty  Ortho surgery   Infectious Disease     Code Status  Full     Disposition  Continue inpatient pending culture results, will need outpatient IV abx on discharge.      Review of Systems  Review of Systems   Constitutional: Negative for chills, diaphoresis, fever, malaise/fatigue and weight loss.   Respiratory: Negative for cough and shortness of breath.  "   Cardiovascular: Negative for chest pain and palpitations.   Gastrointestinal: Negative for abdominal pain, nausea and vomiting.   Musculoskeletal: Positive for joint pain.   All other systems reviewed and are negative.       Physical Exam  Temp:  [36.3 °C (97.3 °F)-36.8 °C (98.3 °F)] 36.8 °C (98.3 °F)  Pulse:  [66-99] 99  Resp:  [12-23] 16  BP: (110-146)/() 124/95  SpO2:  [91 %-100 %] 93 %    Physical Exam  Vitals signs and nursing note reviewed.   Constitutional:       General: She is not in acute distress.     Appearance: She is well-developed.   HENT:      Head: Normocephalic and atraumatic.      Mouth/Throat:      Pharynx: No oropharyngeal exudate.   Eyes:      General: No scleral icterus.  Neck:      Musculoskeletal: Normal range of motion and neck supple.      Thyroid: No thyromegaly.   Cardiovascular:      Rate and Rhythm: Normal rate and regular rhythm.      Heart sounds: Normal heart sounds. No murmur.   Pulmonary:      Effort: Pulmonary effort is normal. No respiratory distress.      Breath sounds: Normal breath sounds. No wheezing.   Abdominal:      General: Bowel sounds are normal. There is no distension.      Palpations: Abdomen is soft.      Tenderness: There is no tenderness.   Musculoskeletal:         General: Tenderness present. No swelling.      Comments: LLE covered with dressing, c/d/i .   Skin:     General: Skin is warm and dry.      Findings: No rash.   Neurological:      Mental Status: She is alert and oriented to person, place, and time.      Cranial Nerves: No cranial nerve deficit.           Fluids    Intake/Output Summary (Last 24 hours) at 12/4/2019 1519  Last data filed at 12/4/2019 1359  Gross per 24 hour   Intake 1460 ml   Output 10 ml   Net 1450 ml       Laboratory  Recent Labs     12/02/19  0353 12/03/19  0523 12/04/19  0406   WBC 6.2 4.8 5.1   RBC 4.11* 4.28 4.25   HEMOGLOBIN 10.5* 11.0* 11.0*   HEMATOCRIT 33.9* 35.5* 35.0*   MCV 82.5 82.9 82.4   MCH 25.5* 25.7* 25.9*   MCHC  31.0* 31.0* 31.4*   RDW 50.8* 51.2* 49.5   PLATELETCT 237 231 248   MPV 9.2 9.2 8.9*     Recent Labs     12/02/19  0353 12/03/19  0523 12/04/19  0406   SODIUM 137 136 138   POTASSIUM 3.7 3.9 4.1   CHLORIDE 106 105 105   CO2 22 22 26   GLUCOSE 96 87 96   BUN 13 12 14   CREATININE 0.66 0.70 0.84   CALCIUM 8.2* 8.3* 8.6                   Imaging  NM-BONE SCAN(LIMITED)/FLOW   Final Result      1.  There is increased activity on all 3 phases in the left ankle/distal tibial region .   2.  While this could represent osteomyelitis, the fact that there is fracture in this region based on x-rays, this could also simply represent cellulitis with bony remodeling causing the uptake on the delayed imaging.      US-EXTREMITY NON VASCULAR UNILATERAL LEFT   Final Result      Diffuse edema which could indicate cellulitis. No abscess visualized.      DX-ANKLE 3+ VIEWS LEFT   Final Result   Addendum 1 of 1   Additional clinical history: Infection   In light of the above clinical history there is some irregularity in the    lateral malleolus cortex. Osteomyelitis is not excluded in this region.    Further assessment could be performed with bone scan.      Findings were discussed with DANIEL RUIZ II on 12/1/2019 10:04 PM.      Final      IR-PICC LINE PLACEMENT W/ GUIDANCE > AGE 5    (Results Pending)   Dictation #1  MRN:[id number]  CSN:[id number]       Assessment/Plan  * Cellulitis of left lower extremity- (present on admission)  Assessment & Plan  - s/p I&D and hardware removal on 12/04/2019  - Patient started on ceftriaxone and vancomycin per ID recommendations  - Postop cultures pending  - Will most likely need 6 weeks of IV antibiotics, PICC line order placed  - Continue wound care  - PT ordered.     Rheumatoid arthritis (HCC)- (present on admission)  Assessment & Plan  - On methotrexate/plaquenil regimen and PRN NSAID therapy.  Stable.       Essential hypertension- (present on admission)  Assessment & Plan  - Controlled with  current medication regimen which will be continued.  Monitor.      Other forms of systemic lupus erythematosus (HCC)- (present on admission)  Assessment & Plan  - On methotrexate therapy.  Monitor.        VTE prophylaxis: ANDREW's, lovenox     I have performed a physical exam and reviewed and updated ROS and Plan today (12/4/2019). In review of yesterday's note (12/3/2019), there are no changes except as documented above.

## 2019-12-04 NOTE — ANESTHESIA PREPROCEDURE EVALUATION
Left ankle infection s/p ORIF 4/2019    Relevant Problems   PULMONARY   (+) Mild intermittent asthma without complication      CARDIAC   (+) Essential hypertension      Other   (+) Rheumatoid arthritis (HCC)   BMI 37    Physical Exam    Airway   Mallampati: II  TM distance: >3 FB  Neck ROM: full       Cardiovascular - normal exam  Rhythm: regular  Rate: normal  (-) murmur     Dental - normal exam         Pulmonary - normal exam  Breath sounds clear to auscultation     Abdominal    Neurological - normal exam                 Anesthesia Plan    ASA 2- EMERGENT (left ankle infection)       Plan - general       Airway plan will be LMA        Induction: intravenous    Postoperative Plan: Postoperative administration of opioids is intended.    Pertinent diagnostic labs and testing reviewed    Informed Consent:    Anesthetic plan and risks discussed with patient.    Use of blood products discussed with: patient whom consented to blood products.

## 2019-12-04 NOTE — PROGRESS NOTES
Infectious Disease Progress Note    Author: Marleny Moss M.D. Date & Time of service: 2019  1:57 PM    Chief Complaint:  FU for left ankle Hardware infection    Interval History:  44 y.o. woman with a history of rheumatoid arthritis and SLE on Plaquenil and methotrexate, and a history of a left ankle fracture in April status post ORIF followed by one screw removal in 2019 admitted for left ankle swelling, erythema and worsening pain.  Bone scan was indeterminant. S/p HW removal on  AF WBC 5.1 tired. S/p HWR this morning. Post op pain controlled.     Labs Reviewed.    Review of Systems:  Review of Systems   Constitutional: Negative for chills and fever.   Respiratory: Negative for cough and shortness of breath.    Gastrointestinal: Negative for abdominal pain, diarrhea, nausea and vomiting.   Genitourinary: Negative for dysuria.   Musculoskeletal: Positive for joint pain.   Neurological: Negative for dizziness and headaches.   All other systems reviewed and are negative.      Hemodynamics:  Temp (24hrs), Av.6 °C (97.9 °F), Min:36.3 °C (97.3 °F), Max:36.8 °C (98.3 °F)  Temperature: 36.8 °C (98.3 °F)  Pulse  Av.4  Min: 66  Max: 99   Blood Pressure: 124/95       Physical Exam:  Physical Exam  Constitutional:       Appearance: Normal appearance.   HENT:      Head: Normocephalic and atraumatic.      Nose: Nose normal.      Mouth/Throat:      Mouth: Mucous membranes are moist.      Pharynx: No oropharyngeal exudate.   Eyes:      Extraocular Movements: Extraocular movements intact.      Conjunctiva/sclera: Conjunctivae normal.      Pupils: Pupils are equal, round, and reactive to light.   Neck:      Musculoskeletal: Normal range of motion and neck supple.   Cardiovascular:      Rate and Rhythm: Normal rate and regular rhythm.      Heart sounds: No murmur.   Pulmonary:      Effort: Pulmonary effort is normal.      Breath sounds: Normal breath sounds.   Abdominal:      Palpations:  Abdomen is soft.      Tenderness: There is no tenderness.   Musculoskeletal:      Comments: Left ankle in primary surgical dressing   Skin:     General: Skin is warm and dry.   Neurological:      General: No focal deficit present.      Mental Status: She is alert and oriented to person, place, and time.   Psychiatric:         Mood and Affect: Mood normal.         Behavior: Behavior normal.         Meds:    Current Facility-Administered Medications:   •  MD Alert...Vancomycin per Pharmacy  •  cefTRIAXone (ROCEPHIN) IV  •  vancomycin  •  [START ON 12/5/2019] vancomycin  •  NS  •  [START ON 12/9/2019] methotrexate  •  lisinopril  •  hydroCHLOROthiazide  •  acetaminophen  •  Notify provider if pain remains uncontrolled **AND** Use the numeric rating scale (NRS-11) on regular floors and Critical-Care Pain Observation Tool (CPOT) on ICUs/Trauma to assess pain **AND** Pulse Ox (Oximetry) **AND** Pharmacy Consult Request **AND** If patient difficult to arouse and/or has respiratory depression, stop any opiates that are currently infusing and call a Rapid Response. **AND** oxyCODONE immediate-release **AND** oxyCODONE immediate-release **AND** morphine injection  •  cloNIDine  •  ondansetron  •  ondansetron  •  promethazine  •  promethazine  •  prochlorperazine  •  senna-docusate **AND** polyethylene glycol/lytes **AND** magnesium hydroxide **AND** bisacodyl  •  ketorolac    Labs:  Recent Labs     12/01/19 2025 12/02/19  0353 12/03/19  0523 12/04/19  0406   WBC 7.7 6.2 4.8 5.1   RBC 4.36 4.11* 4.28 4.25   HEMOGLOBIN 11.3* 10.5* 11.0* 11.0*   HEMATOCRIT 36.1* 33.9* 35.5* 35.0*   MCV 82.8 82.5 82.9 82.4   MCH 25.9* 25.5* 25.7* 25.9*   RDW 50.7* 50.8* 51.2* 49.5   PLATELETCT 265 237 231 248   MPV 9.6 9.2 9.2 8.9*   NEUTSPOLYS 60.00 65.60 68.20  --    LYMPHOCYTES 31.20 25.20 23.90  --    MONOCYTES 6.30 6.10 4.40  --    EOSINOPHILS 2.10 2.60 3.50  --    BASOPHILS 0.10 0.20 0.00  --    RBCMORPHOLO  --   --  Normal  --      Recent  Labs     12/02/19  0353 12/03/19  0523 12/04/19  0406   SODIUM 137 136 138   POTASSIUM 3.7 3.9 4.1   CHLORIDE 106 105 105   CO2 22 22 26   GLUCOSE 96 87 96   BUN 13 12 14     Recent Labs     12/01/19 2025 12/02/19  0353 12/03/19  0523 12/04/19  0406   ALBUMIN 4.0  --   --   --    TBILIRUBIN 0.3  --   --   --    ALKPHOSPHAT 64  --   --   --    TOTPROTEIN 7.2  --   --   --    ALTSGPT 9  --   --   --    ASTSGOT 14  --   --   --    CREATININE 0.82 0.66 0.70 0.84       Imaging:  Dx-ankle 3+ Views Left    Addendum Date: 12/1/2019    Additional clinical history: Infection In light of the above clinical history there is some irregularity in the lateral malleolus cortex. Osteomyelitis is not excluded in this region. Further assessment could be performed with bone scan. Findings were discussed with DANIEL RUIZ II on 12/1/2019 10:04 PM.    Result Date: 12/1/2019 12/1/2019 8:13 PM HISTORY/REASON FOR EXAM:  Pain/Deformity Following Trauma; prior left ankle fracture. TECHNIQUE/EXAM DESCRIPTION AND NUMBER OF VIEWS:  3 views of the  LEFT ankle. COMPARISON: 4/8/2019 FINDINGS: MINERALIZATION: Mineralization is unremarkable for age. INJURY: There has been interval open reduction and internal fixation of the medial and lateral malleolar fractures. There is curvilinear deformity of the underlying bony structures. There is indistinctly visualized lucency extending through the posterior  malleolus. This is similar to the prior study and partially obscured by overlying hardware. There is no evidence of hardware loosening or failure. JOINTS: No erosive arthropathy is evident.  Ankle mortise is symmetric. Distal tibial fibular syndesmosis appears widened.     1.  Interval open reduction and internal fixation of lateral and medial malleoli fractures 2.  Indistinctly visualized posterior malleolus fracture, likely indicating some amount of incomplete bony fusion of the previously demonstrated posterior malleolar fracture site, though  interval re-injury is not excluded 3.  Widened distal tibiofibular syndesmosis, similar to prior    Nm-bone Scan(limited)/flow    Result Date: 12/2/2019 12/2/2019 1:08 PM HISTORY/REASON FOR EXAM:  Foot pain, initial exam; Left ankle cellulitis with concern for osteomyelitis TECHNIQUE/EXAM DESCRIPTION AND NUMBER OF VIEWS:  26.2 mCi Tc 99m-MDP was administered intravenously followed by three-phase imaging of the ankles and feet. COMPARISON:  X-ray 12/1/2019 FINDINGS:     The blood pool images demonstrate increased flow to the left ankle. The blood pool images demonstrate increased activity in the left ankle. The delayed images demonstrate increased activity in the left ankle the region of the distal tibia.     1.  There is increased activity on all 3 phases in the left ankle/distal tibial region . 2.  While this could represent osteomyelitis, the fact that there is fracture in this region based on x-rays, this could also simply represent cellulitis with bony remodeling causing the uptake on the delayed imaging.    Us-extremity Non Vascular Unilateral Left    Result Date: 12/1/2019 12/1/2019 9:29 PM HISTORY/REASON FOR EXAM:  Leukopenia, swelling over distal lateral LEFT leg at site of prior OriF TECHNIQUE/EXAM DESCRIPTION AND NUMBER OF VIEWS: The distal LEFT lateral leg was scanned with grayscale. Selected images were submitted for radiologist review. COMPARISON: None FINDINGS: There is diffuse edema in the soft tissues of the region of interest. No discrete fluid collection is seen.     Diffuse edema which could indicate cellulitis. No abscess visualized.      Micro:  Results     Procedure Component Value Units Date/Time    Anaerobic Culture [[id number]] Collected:  12/04/19 0827    Order Status:  Completed Specimen:  Other Updated:  12/04/19 0909    CULTURE WOUND W/ GRAM STAIN [[id number]] Collected:  12/04/19 0827    Order Status:  Completed Specimen:  Other Updated:  12/04/19 0909          Assessment:  Active  Hospital Problems    Diagnosis   • *Cellulitis of left lower extremity [L03.116]   • Rheumatoid arthritis (HCC) [M06.9]   • Other forms of systemic lupus erythematosus (HCC) [M32.8]     Plan:  Left ankle hardware infection  Underlying osteomyelitis  S/p hardware removal and I&D down to bone on 12/4/19 by Dr. Pitts  OR cultures pending  Continue vanco and ceftriaxone  Monitor renal function and vanco trough  PICC ordered  Plan for 6 weeks of IV abx for hardware infection and osteomyelitis from 12/4  Stop date 01/15/19    H/o left ankle fracture  S/p ORIF in April  Screw removal in August    Rheumatoid arthritis and SLE  On MTX and plaquenil    Plan of care Discussed with internal medicine/Dr Sahu and RN

## 2019-12-04 NOTE — PROGRESS NOTES
"Pharmacy Kinetics 44 y.o. female on vancomycin day # 1 2019    Currently on Vancomycin 2800 mg iv once  Provider specified end date: TBD    Indication for Treatment: Osteomyelitis    Pertinent history per medical record: Admitted on 2019 for ankle pain. Patient has a history of ORIF on left ankle fracture on 2019 followed by screw removal in 2019. Patient with acute swelling, pain and erythema. Patient went to the OR on 19 with left hardware removal, I&D of skin, subcutaneous tissue, muscle and bone. ID has been consulted and will further recommend duration of therapy and abx selection based of off hospital course and cultures.     Other antibiotics: ceftriaxone 2 g IV q24h    Allergies: Patient has no known allergies.     List concerns for renal function: BMI ~ 37    Pertinent cultures to date:   19: Operative Wound Culture in process    MRSA nares swab if pneumonia is a concern (ordered/positive/negative/n-a): n/a    Recent Labs     19  0353 19  0523 19  0406   WBC 7.7 6.2 4.8 5.1   NEUTSPOLYS 60.00 65.60 68.20  --      Recent Labs     19  0353 19  0523 19  0406   BUN 15 13 12 14   CREATININE 0.82 0.66 0.70 0.84   ALBUMIN 4.0  --   --   --      No results for input(s): VANCOTROUGH, VANCOPEAK, VANCORANDOM in the last 72 hours.    Intake/Output Summary (Last 24 hours) at 2019 1134  Last data filed at 2019 0848  Gross per 24 hour   Intake 1340 ml   Output 10 ml   Net 1330 ml      /105   Pulse 76   Temp 36.3 °C (97.3 °F) (Temporal)   Resp 17   Ht 1.715 m (5' 7.5\")   Wt 110.1 kg (242 lb 11.6 oz)   SpO2 99%  Temp (24hrs), Av.6 °C (97.9 °F), Min:36.3 °C (97.3 °F), Max:36.8 °C (98.3 °F)      A/P   1. Vancomycin dose change: initiate vancomycin 1900 mg IV q12h starting tomorrow at 0000  2. Next vancomycin level: 19 at 1130 prior to third total dose  3. Goal trough: 12-16 mcg/mL  4. Comments: " Vancomycin initiated s/p removal of infected hardware. ID has been consulted and will adjust abxs and duration appropriately based on cultures and operative report. Will initiate vancomycin 17 mg/kg IV q12h starting tomorrow at 0000 per protocol and evalate with a trough tomorrow AM due to some concerns for accumulation based off of weight. Renal function stable, patient afebrile with no leukocytosis. Pharmacy will continue to monitor and de-escalate as appropriate.    Thank you!    Lorena Cordova, PharmD, BCPS

## 2019-12-04 NOTE — CARE PLAN
Problem: Communication  Goal: The ability to communicate needs accurately and effectively will improve  Intervention: Educate patient and significant other/support system about the plan of care, procedures, treatments, medications and allow for questions  Note:   Pt updated on POC. All questions and concerns addressed at this time.      Problem: Safety  Goal: Will remain free from injury  Intervention: Educate patient and significant other/support system about adaptive mobility strategies and safe transfers  Note:   Pt educated to call staff when needing assistance. Bed locked in lowest position. Call light within reach.

## 2019-12-04 NOTE — PROGRESS NOTES
Pt down to preop. Pt still wearing head wrap. Pt requesting to not remove it until she can put on a hair net for surgery.

## 2019-12-04 NOTE — ANESTHESIA QCDR
2019 [facility] Clinical Data Registry (for Quality Improvement)     Postoperative nausea/vomiting risk protocol (Adult = 18 yrs and Pediatric 3-17 yrs)- (430 and 463)  General inhalation anesthetic (NOT TIVA) with PONV risk factors: Yes  Provision of anti-emetic therapy with at least 2 different classes of agents: Yes   Patient DID NOT receive anti-emetic therapy and reason is documented in Medical Record:  N/A    Multimodal Pain Management- (AQI59)  Patient undergoing Elective Surgery (i.e. Outpatient, or ASC, or Prescheduled Surgery prior to Hospital Admission): No  Use of Multimodal Pain Management, two or more drugs and/or interventions, NOT including systemic opioids: N/A  Exception: Documented allergy to multiple classes of analgesics: N/A    PACU assessment of acute postoperative pain prior to Anesthesia Care End- Applies to Patients Age = 18- (ABG7)  Initial PACU pain score is which of the following: < 7/10  Patient unable to report pain score: N/A    Post-anesthetic transfer of care checklist/protocol to PACU/ICU- (426 and 427)  Upon conclusion of case, patient transferred to which of the following locations: PACU/Non-ICU  Use of transfer checklist/protocol: Yes  Exclusion: Service Performed in Patient Hospital Room (and thus did not require transfer): N/A    PACU Reintubation- (AQI31)  General anesthesia requiring endotracheal intubation (ETT) along with subsequent extubation in OR or PACU: No  Required reintubation in the PACU: N/A  Extubation was a planned trial documented in the medical record prior to removal of the original airway device: N/A    Unplanned admission to ICU related to anesthesia service up through end of PACU care- (MD51)  Unplanned admission to ICU (not initially anticipated at anesthesia start time): No

## 2019-12-04 NOTE — ANESTHESIA PROCEDURE NOTES
Airway  Date/Time: 12/4/2019 8:14 AM  Performed by: Ihsan Benjamin M.D.  Authorized by: Ihsan Benjamin M.D.     Location:  OR  Urgency:  Elective  Difficult Airway: No    Indications for Airway Management:  Anesthesia  Spontaneous Ventilation: absent    Sedation Level:  Deep  Preoxygenated: Yes    Mask Difficulty Assessment:  0 - not attempted  Final Airway Type:  Supraglottic airway  Final Supraglottic Airway:  Standard LMA  SGA Size:  4  Number of Attempts at Approach:  1

## 2019-12-04 NOTE — PROGRESS NOTES
Bedside report received.  Assessment complete.  A&O x 4. Patient calls appropriately.  Patient up by self. Pt has a steady gait. Bed alarm off.   Patient has 5/10 pain. Pt being medicated per MAR.  Denies N&V. Tolerating regular diet. Pt NPO at midnight for procedure in AM.   Old surgical scar noted to LLE, swelling/ tenderness to LLE ankle.   + void, - BM.  Patient denies SOB.      Review plan with of care with patient. Call light and personal belongings with in reach. Hourly rounding in place. All needs met at this time.

## 2019-12-04 NOTE — ANESTHESIA TIME REPORT
Anesthesia Start and Stop Event Times     Date Time Event    12/4/2019 0742 Ready for Procedure     0809 Anesthesia Start     0849 Anesthesia Stop        Responsible Staff  12/04/19    Name Role Begin End    Ihsan Benjamin M.D. Anesth 0809 0849        Preop Diagnosis (Free Text):  Pre-op Diagnosis     LEFT ANKLE PAIN        Preop Diagnosis (Codes):    Post op Diagnosis  Left ankle pain      Premium Reason  Non-Premium    Comments:

## 2019-12-04 NOTE — CONSULTS
12/3/2019    Annalise Woodard is a 44 y.o. female who presents after a ORIF ankle  with a recurrent infection and is here for operative management. Patient denies numbness, parasthesias, loss of concousness or other trauma    Past Medical History:   Diagnosis Date   • ASTHMA    • Hypertension    • Lupus (HCC)    • Rheumatoid arthritis (HCC)        Past Surgical History:   Procedure Laterality Date   • ANKLE ORIF Left 4/8/2019    Procedure: ORIF, ANKLE;  Surgeon: Gildardo Padron M.D.;  Location: SURGERY [facility];  Service: Orthopedics       Medications  No current facility-administered medications on file prior to encounter.      Current Outpatient Medications on File Prior to Encounter   Medication Sig Dispense Refill   • lisinopril (PRINIVIL) 10 MG Tab Take 10 mg by mouth every day.     • vitamin D (CHOLECALCIFEROL) 1000 UNIT Tab Take 1,000 Units by mouth every day.     • hydroxychloroquine (PLAQUENIL) 200 MG Tab Take 200 mg by mouth 2 Times a Day.  3   • folic acid (FOLVITE) 1 MG Tab Take 1 mg by mouth every day.     • methotrexate 2.5 MG Tab Take 10 mg by mouth every Monday. On Monday      • hydroCHLOROthiazide (HYDRODIURIL) 25 MG Tab Take 25 mg by mouth every day.         Allergies  Patient has no known allergies.    ROS  Left ankle pain and erythema. All other systems were reviewed and found to be negative    Family History   Problem Relation Age of Onset   • Heart Attack Mother        Social History     Socioeconomic History   • Marital status: Single     Spouse name: Not on file   • Number of children: Not on file   • Years of education: Not on file   • Highest education level: Not on file   Occupational History   • Not on file   Social Needs   • Financial resource strain: Not on file   • Food insecurity:     Worry: Not on file     Inability: Not on file   • Transportation needs:     Medical: Not on file     Non-medical: Not on file   Tobacco Use   • Smoking status: Never Smoker   • Smokeless  "tobacco: Never Used   Substance and Sexual Activity   • Alcohol use: Yes     Frequency: 2-4 times a month     Drinks per session: 1 or 2     Binge frequency: Never   • Drug use: No   • Sexual activity: Not on file   Lifestyle   • Physical activity:     Days per week: Not on file     Minutes per session: Not on file   • Stress: Not on file   Relationships   • Social connections:     Talks on phone: Not on file     Gets together: Not on file     Attends Anabaptist service: Not on file     Active member of club or organization: Not on file     Attends meetings of clubs or organizations: Not on file     Relationship status: Not on file   • Intimate partner violence:     Fear of current or ex partner: Not on file     Emotionally abused: Not on file     Physically abused: Not on file     Forced sexual activity: Not on file   Other Topics Concern   • Not on file   Social History Narrative   • Not on file       Physical Exam  Vitals  /105   Pulse 86   Temp 36.8 °C (98.3 °F) (Temporal)   Resp 16   Ht 1.715 m (5' 7.5\")   Wt 110.1 kg (242 lb 11.6 oz)   SpO2 97%   General: Well Developed, Well Nourished, no acute distress  HEENT: Normocephalic, atraumatic  Eyes: Anicteric, PERRLA, EOMI  Neck: Supple, nontender, no masses  Lungs: CTA, no wheezes or crackles  Heart: RRR, no murmurs, rubs or gallops  Abdomen: Soft, NT, ND  Pelvis: Stable to AP and Lateral Compression  Skin: Intact, no open wounds  Extremities: left ankle erythema and pain  Neuro: NVI  Vascular: 2+DP/PT, Capillary refill <2 seconds    Radiographs:  NM-BONE SCAN(LIMITED)/FLOW   Final Result      1.  There is increased activity on all 3 phases in the left ankle/distal tibial region .   2.  While this could represent osteomyelitis, the fact that there is fracture in this region based on x-rays, this could also simply represent cellulitis with bony remodeling causing the uptake on the delayed imaging.      US-EXTREMITY NON VASCULAR UNILATERAL LEFT   Final " Result      Diffuse edema which could indicate cellulitis. No abscess visualized.      DX-ANKLE 3+ VIEWS LEFT   Final Result   Addendum 1 of 1   Additional clinical history: Infection   In light of the above clinical history there is some irregularity in the    lateral malleolus cortex. Osteomyelitis is not excluded in this region.    Further assessment could be performed with bone scan.      Findings were discussed with DANIEL RUIZ II on 12/1/2019 10:04 PM.      Final          Laboratory Values  Recent Labs     12/01/19 2025 12/02/19 0353 12/03/19  0523   WBC 7.7 6.2 4.8   RBC 4.36 4.11* 4.28   HEMOGLOBIN 11.3* 10.5* 11.0*   HEMATOCRIT 36.1* 33.9* 35.5*   MCV 82.8 82.5 82.9   MCH 25.9* 25.5* 25.7*   MCHC 31.3* 31.0* 31.0*   RDW 50.7* 50.8* 51.2*   PLATELETCT 265 237 231   MPV 9.6 9.2 9.2     Recent Labs     12/01/19 2025 12/02/19 0353 12/03/19 0523   SODIUM 138 137 136   POTASSIUM 3.6 3.7 3.9   CHLORIDE 104 106 105   CO2 26 22 22   GLUCOSE 107* 96 87   BUN 15 13 12             Impression: Left ankle osteomyelitis    Plan:Operative intervention. Risks and benefits of surgery were discussed which include but are not limited to bleeding, infection, neurovascular damage, malunion, nonunion, instability, limb length discrepancy, DVT, PE, MI, Stroke and death. They understand these risks and wish to proceed.

## 2019-12-04 NOTE — PROGRESS NOTES
PACU RN called for report this AM. Report given to Naa.   Denied that pt needed CHG bath before procedure.

## 2019-12-05 ENCOUNTER — APPOINTMENT (OUTPATIENT)
Dept: RADIOLOGY | Facility: MEDICAL CENTER | Age: 44
DRG: 493 | End: 2019-12-05
Attending: INTERNAL MEDICINE
Payer: COMMERCIAL

## 2019-12-05 LAB
ANION GAP SERPL CALC-SCNC: 7 MMOL/L (ref 0–11.9)
BUN SERPL-MCNC: 12 MG/DL (ref 8–22)
CALCIUM SERPL-MCNC: 8.5 MG/DL (ref 8.5–10.5)
CHLORIDE SERPL-SCNC: 105 MMOL/L (ref 96–112)
CO2 SERPL-SCNC: 24 MMOL/L (ref 20–33)
CREAT SERPL-MCNC: 0.85 MG/DL (ref 0.5–1.4)
ERYTHROCYTE [DISTWIDTH] IN BLOOD BY AUTOMATED COUNT: 50.1 FL (ref 35.9–50)
GLUCOSE SERPL-MCNC: 92 MG/DL (ref 65–99)
HCT VFR BLD AUTO: 32.8 % (ref 37–47)
HGB BLD-MCNC: 10.1 G/DL (ref 12–16)
MCH RBC QN AUTO: 25.3 PG (ref 27–33)
MCHC RBC AUTO-ENTMCNC: 30.8 G/DL (ref 33.6–35)
MCV RBC AUTO: 82 FL (ref 81.4–97.8)
PLATELET # BLD AUTO: 255 K/UL (ref 164–446)
PMV BLD AUTO: 8.9 FL (ref 9–12.9)
POTASSIUM SERPL-SCNC: 3.8 MMOL/L (ref 3.6–5.5)
RBC # BLD AUTO: 4 M/UL (ref 4.2–5.4)
SODIUM SERPL-SCNC: 136 MMOL/L (ref 135–145)
VANCOMYCIN SERPL-MCNC: 17.2 UG/ML
WBC # BLD AUTO: 7.4 K/UL (ref 4.8–10.8)

## 2019-12-05 PROCEDURE — 99232 SBSQ HOSP IP/OBS MODERATE 35: CPT | Performed by: HOSPITALIST

## 2019-12-05 PROCEDURE — A9270 NON-COVERED ITEM OR SERVICE: HCPCS | Performed by: HOSPITALIST

## 2019-12-05 PROCEDURE — 700102 HCHG RX REV CODE 250 W/ 637 OVERRIDE(OP): Performed by: HOSPITALIST

## 2019-12-05 PROCEDURE — 80048 BASIC METABOLIC PNL TOTAL CA: CPT

## 2019-12-05 PROCEDURE — 700111 HCHG RX REV CODE 636 W/ 250 OVERRIDE (IP): Performed by: HOSPITALIST

## 2019-12-05 PROCEDURE — 80202 ASSAY OF VANCOMYCIN: CPT

## 2019-12-05 PROCEDURE — 36415 COLL VENOUS BLD VENIPUNCTURE: CPT

## 2019-12-05 PROCEDURE — 36573 INSJ PICC RS&I 5 YR+: CPT

## 2019-12-05 PROCEDURE — B548ZZA ULTRASONOGRAPHY OF SUPERIOR VENA CAVA, GUIDANCE: ICD-10-PCS | Performed by: INTERNAL MEDICINE

## 2019-12-05 PROCEDURE — 700105 HCHG RX REV CODE 258: Performed by: NURSE PRACTITIONER

## 2019-12-05 PROCEDURE — 700111 HCHG RX REV CODE 636 W/ 250 OVERRIDE (IP): Performed by: NURSE PRACTITIONER

## 2019-12-05 PROCEDURE — 97161 PT EVAL LOW COMPLEX 20 MIN: CPT

## 2019-12-05 PROCEDURE — 770006 HCHG ROOM/CARE - MED/SURG/GYN SEMI*

## 2019-12-05 PROCEDURE — 02HV33Z INSERTION OF INFUSION DEVICE INTO SUPERIOR VENA CAVA, PERCUTANEOUS APPROACH: ICD-10-PCS | Performed by: INTERNAL MEDICINE

## 2019-12-05 PROCEDURE — 700105 HCHG RX REV CODE 258: Performed by: HOSPITALIST

## 2019-12-05 PROCEDURE — 99232 SBSQ HOSP IP/OBS MODERATE 35: CPT | Performed by: INTERNAL MEDICINE

## 2019-12-05 PROCEDURE — 85027 COMPLETE CBC AUTOMATED: CPT

## 2019-12-05 RX ADMIN — HYDROCHLOROTHIAZIDE 25 MG: 25 TABLET ORAL at 06:34

## 2019-12-05 RX ADMIN — OXYCODONE HYDROCHLORIDE 2.5 MG: 5 TABLET ORAL at 09:40

## 2019-12-05 RX ADMIN — SENNOSIDES AND DOCUSATE SODIUM 2 TABLET: 8.6; 5 TABLET ORAL at 16:57

## 2019-12-05 RX ADMIN — OXYCODONE HYDROCHLORIDE 2.5 MG: 5 TABLET ORAL at 05:58

## 2019-12-05 RX ADMIN — LISINOPRIL 10 MG: 10 TABLET ORAL at 06:34

## 2019-12-05 RX ADMIN — OXYCODONE HYDROCHLORIDE 5 MG: 5 TABLET ORAL at 16:57

## 2019-12-05 RX ADMIN — ENOXAPARIN SODIUM 40 MG: 100 INJECTION SUBCUTANEOUS at 06:36

## 2019-12-05 RX ADMIN — SENNOSIDES AND DOCUSATE SODIUM 2 TABLET: 8.6; 5 TABLET ORAL at 06:34

## 2019-12-05 RX ADMIN — KETOROLAC TROMETHAMINE 30 MG: 30 INJECTION, SOLUTION INTRAMUSCULAR at 00:28

## 2019-12-05 RX ADMIN — VANCOMYCIN HYDROCHLORIDE 1900 MG: 500 INJECTION, POWDER, LYOPHILIZED, FOR SOLUTION INTRAVENOUS at 12:35

## 2019-12-05 RX ADMIN — KETOROLAC TROMETHAMINE 30 MG: 30 INJECTION, SOLUTION INTRAMUSCULAR at 09:33

## 2019-12-05 RX ADMIN — CEFTRIAXONE SODIUM 2 G: 2 INJECTION, POWDER, FOR SOLUTION INTRAMUSCULAR; INTRAVENOUS at 06:35

## 2019-12-05 RX ADMIN — VANCOMYCIN HYDROCHLORIDE 1900 MG: 500 INJECTION, POWDER, LYOPHILIZED, FOR SOLUTION INTRAVENOUS at 00:28

## 2019-12-05 RX ADMIN — KETOROLAC TROMETHAMINE 30 MG: 30 INJECTION, SOLUTION INTRAMUSCULAR at 21:28

## 2019-12-05 ASSESSMENT — COGNITIVE AND FUNCTIONAL STATUS - GENERAL
MOBILITY SCORE: 24
SUGGESTED CMS G CODE MODIFIER MOBILITY: CH

## 2019-12-05 ASSESSMENT — ENCOUNTER SYMPTOMS
SHORTNESS OF BREATH: 0
DIZZINESS: 0
DIAPHORESIS: 0
FEVER: 0
VOMITING: 0
ABDOMINAL PAIN: 0
COUGH: 0
DIARRHEA: 0
PALPITATIONS: 0
CHILLS: 0
MYALGIAS: 1
NAUSEA: 0
HEADACHES: 0
WEIGHT LOSS: 0

## 2019-12-05 ASSESSMENT — GAIT ASSESSMENTS
DEVIATION: ANTALGIC;DECREASED BASE OF SUPPORT
GAIT LEVEL OF ASSIST: SUPERVISED
DISTANCE (FEET): 200
ASSISTIVE DEVICE: FRONT WHEEL WALKER

## 2019-12-05 NOTE — PROGRESS NOTES
Pt arrived back up to unit   Pt is AAOx4  HA[place]  VSS  Denies pain. C/O stiffness of joints.  -N/T   LLE dressing w/ ace wrap in place. Leg elevated  Toes warm to touch with good capillary refill.   Denies SOB  -N/V; tolerating regular diet  +BS +Flatus. Last BM 12/1  Pt up self w/ steady gait  POC discussed  Bed locked and in the lowest position  Call light w/in reach

## 2019-12-05 NOTE — PROGRESS NOTES
"Hospital Medicine Daily Progress Note    Date of Service  12/5/2019    Chief Complaint  44 y.o. female admitted 12/1/2019 with cellulitis of the left ankle with hardware present     Hospital Course    \"Annalise Woodard is a 44 y.o. female with history of prior ankle fracture status post surgical repair with hardware, systemic lupus erythematosus which is controlled, and essential hypertension on medical regimen, was in her usual state of health which includes regular pain in her ankle which occurs at the end of the day and is improved by awakening, noted something different on the day of admission.  She awoke with ankle pain, and a swelling at the surgical site, which seemed to increase throughout the day instead of improving.  She subsequently presented to the emergency department for further evaluation.  She has no other complaints, including chest pain, shortness of breath, abdominal pain, nausea vomiting, diarrhea or constipation.  No reported fever or chills.\"- Copied from H&P by Dr. Merlos.       Interval Problem Update  No acute changes overnight.  All vital signs are stable. Patient reports aching incisional pain this morning which was relieved after as needed medication.  Left foot swelling increased compared to yesterday, but incision is without signs of infection.  Dressing clean dry and intact.     Consultants/Specialty  Ortho surgery   Infectious Disease     Code Status  Full     Disposition  Continue inpatient pending post-op cultures and [facility]     Review of Systems  Review of Systems   Constitutional: Negative for chills, diaphoresis, fever, malaise/fatigue and weight loss.   Respiratory: Negative for cough and shortness of breath.    Cardiovascular: Negative for chest pain and palpitations.   Gastrointestinal: Negative for abdominal pain, nausea and vomiting.   Musculoskeletal: Positive for joint pain and myalgias.   All other systems reviewed and are negative.       Physical Exam  Temp:  [36.7 °C (98 " °F)-37.2 °C (98.9 °F)] 37 °C (98.6 °F)  Pulse:  [85-95] 85  Resp:  [16-18] 17  BP: (114-148)/() 135/92  SpO2:  [94 %-97 %] 97 %    Physical Exam  Vitals signs and nursing note reviewed.   Constitutional:       General: She is not in acute distress.     Appearance: She is well-developed.   HENT:      Head: Normocephalic and atraumatic.      Mouth/Throat:      Pharynx: No oropharyngeal exudate.   Eyes:      General: No scleral icterus.     Pupils: Pupils are equal, round, and reactive to light.   Neck:      Musculoskeletal: Normal range of motion and neck supple.      Thyroid: No thyromegaly.   Cardiovascular:      Rate and Rhythm: Normal rate and regular rhythm.      Heart sounds: Normal heart sounds. No murmur.   Pulmonary:      Effort: Pulmonary effort is normal. No respiratory distress.      Breath sounds: Normal breath sounds. No wheezing.   Abdominal:      General: Bowel sounds are normal. There is no distension.      Palpations: Abdomen is soft.      Tenderness: There is no tenderness.   Musculoskeletal: Normal range of motion.         General: Swelling (Left foot ) and tenderness present.   Skin:     General: Skin is warm and dry.      Findings: No rash.      Comments: Dressing to left ankle incision.  Covered with Ace bandage.  Dressing is clean dry and intact.  Minimal serosanguineous drainage.   Neurological:      Mental Status: She is alert and oriented to person, place, and time.      Cranial Nerves: No cranial nerve deficit.           Fluids    Intake/Output Summary (Last 24 hours) at 12/5/2019 1352  Last data filed at 12/5/2019 1300  Gross per 24 hour   Intake 2170 ml   Output 0 ml   Net 2170 ml       Laboratory  Recent Labs     12/03/19  0523 12/04/19  0406 12/05/19  0419   WBC 4.8 5.1 7.4   RBC 4.28 4.25 4.00*   HEMOGLOBIN 11.0* 11.0* 10.1*   HEMATOCRIT 35.5* 35.0* 32.8*   MCV 82.9 82.4 82.0   MCH 25.7* 25.9* 25.3*   MCHC 31.0* 31.4* 30.8*   RDW 51.2* 49.5 50.1*   PLATELETCT 231 248 255   MPV  9.2 8.9* 8.9*     Recent Labs     12/03/19  0523 12/04/19  0406 12/05/19  1152   SODIUM 136 138 136   POTASSIUM 3.9 4.1 3.8   CHLORIDE 105 105 105   CO2 22 26 24   GLUCOSE 87 96 92   BUN 12 14 12   CREATININE 0.70 0.84 0.85   CALCIUM 8.3* 8.6 8.5                   Imaging  IR-PICC LINE PLACEMENT W/ GUIDANCE > AGE 5   Final Result                  Ultrasound-guided PICC placement performed by qualified nursing staff as    above.          NM-BONE SCAN(LIMITED)/FLOW   Final Result      1.  There is increased activity on all 3 phases in the left ankle/distal tibial region .   2.  While this could represent osteomyelitis, the fact that there is fracture in this region based on x-rays, this could also simply represent cellulitis with bony remodeling causing the uptake on the delayed imaging.      US-EXTREMITY NON VASCULAR UNILATERAL LEFT   Final Result      Diffuse edema which could indicate cellulitis. No abscess visualized.      DX-ANKLE 3+ VIEWS LEFT   Final Result   Addendum 1 of 1   Additional clinical history: Infection   In light of the above clinical history there is some irregularity in the    lateral malleolus cortex. Osteomyelitis is not excluded in this region.    Further assessment could be performed with bone scan.      Findings were discussed with DANIEL RUIZ II on 12/1/2019 10:04 PM.      Final           Assessment/Plan  * Cellulitis of left lower extremity- (present on admission)  Assessment & Plan  - s/p I&D and hardware removal on 12/04/2019  - Continue on ceftriaxone and vancomycin.  ID following and adjusting biotics appropriately  - Postop cultures currently no growth to date.  - Will most likely need 6 weeks of IV antibiotics. PICC line today. [facility] referral placed  - Continue wound care and PT    Rheumatoid arthritis (HCC)- (present on admission)  Assessment & Plan  - On methotrexate/plaquenil regimen and PRN NSAID therapy.  Stable.       Essential hypertension- (present on  admission)  Assessment & Plan  - Controlled with current medication regimen which will be continued.  Monitor.      Other forms of systemic lupus erythematosus (HCC)- (present on admission)  Assessment & Plan  - On methotrexate therapy.  Monitor.        VTE prophylaxis: ANDREW's, Lovenox    I have performed a physical exam and reviewed and updated ROS and Plan today (12/5/2019). In review of yesterday's note (12/4/2019), there are no changes except as documented above.

## 2019-12-05 NOTE — CARE PLAN
Problem: Safety  Goal: Will remain free from injury  Outcome: PROGRESSING AS EXPECTED  Note:   Bed locked and in lowest position.  Call light and personal belongings are within reach.       Problem: Bowel/Gastric:  Goal: Normal bowel function is maintained or improved  Outcome: PROGRESSING AS EXPECTED     Problem: Pain Management  Goal: Pain level will decrease to patient's comfort goal  Outcome: PROGRESSING AS EXPECTED  Note:   Will encourage pt to ambulate, Will medicate per MAR, and will provide non-pharmacologic pain relief measures.

## 2019-12-05 NOTE — THERAPY
"Pt s/p hardware removal, left ankle.  Initial injury was in April 2019.  WBAT.  She lives in a third floor apartment w/ her father and adult children.  Today, she is able to move in/out of bed, on/off the toilet and ambulate w/ a fww w/o need of assist, and w/o loss of balance.  Educated regarding \"stretching\" therex for left ankle into dorsiflexion.  Standing against the wall and pulling into dorsiflexion w/ a sheet or belt.  Able to demonstrate w/o issue.  No acute PT needs.      Physical Therapy Evaluation completed.   Bed Mobility:   spv  Transfers:  spv  Gait:   with Front-Wheel Walker       Plan of Care: Patient with no further skilled PT needs in the acute care setting at this time  Discharge Recommendations: Equipment: No Equipment Needed. Post-acute therapy Discharge to home with outpatient or home health for additional skilled therapy services.    See \"Rehab Therapy-Acute\" Patient Summary Report for complete documentation.         "

## 2019-12-05 NOTE — PROGRESS NOTES
"Pharmacy Kinetics 44 y.o. female on vancomycin day # 2 2019    Currently on Vancomycin 1900 mg iv q12hr    Indication for Treatment: Osteomyelitis    Pertinent history per medical record: Admitted on 2019 for ankle pain. Patient has a history of ORIF on left ankle fracture on 2019 followed by screw removal in 2019. Patient with acute swelling, pain and erythema. Patient went to the OR on 19 with left hardware removal, I&D of skin, subcutaneous tissue, muscle and bone. ID has been consulted and will further recommend duration of therapy and abx selection based of off hospital course and cultures.     Other antibiotics: ceftriaxone 2 g IV q24h    Allergies: Patient has no known allergies.     List concerns for renal function: obesity (BMI 37), Toradol     Pertinent cultures to date:   Current cultures NGTD    Recent Labs     19  0523 19  0406 19  0419   WBC 4.8 5.1 7.4   NEUTSPOLYS 68.20  --   --      Recent Labs     19  0523 19  0406 19  1152   BUN 12 14 12   CREATININE 0.70 0.84 0.85     Recent Labs     19  1152   VANCORANDOM 17.2       Intake/Output Summary (Last 24 hours) at 2019 1457  Last data filed at 2019 1300  Gross per 24 hour   Intake 1670 ml   Output 0 ml   Net 1670 ml      /92   Pulse 85   Temp 37 °C (98.6 °F) (Temporal)   Resp 17   Ht 1.715 m (5' 7.5\")   Wt 110.1 kg (242 lb 11.6 oz)   SpO2 97%  Temp (24hrs), Av °C (98.6 °F), Min:36.7 °C (98 °F), Max:37.2 °C (98.9 °F)      A/P   1. Vancomycin dose change: Decrease to 1500mg q12hr (14 mg/kg)  2. Next vancomycin level: 2-3 days or sooner if clinically indicated  3. Goal trough: 12-16 mcg/ml - target high end of goal  4. Comments: trough level is slightly supra therapeutic, collected prior to the 3rd total dose. Expect ongoing accumulation with future doses as well. Will reduce to above dose, expect a trough of 14-15. Will need ongoing monitoring for accumulation " due to body habitus. Monitor renal. Pharmacy will follow, narrow therapy as able.    Ayo Yan, PharmD, BCPS, BCCCP

## 2019-12-05 NOTE — PROGRESS NOTES
Pt A&Ox4.  C/o pain 6/10 to LLE, medicated per MAR.  Surgical dressing in place, CMS in place.   Tolerating diet, denies n/v. + bowel sounds, + flatus, LBM PTA.  Saturating >90% on RA.  Pt ambulates independently, steady gait.  Updated on plan of care. Safety education provided. Bed locked in low. Call light within reach. Rounding in place.

## 2019-12-05 NOTE — CARE PLAN
Problem: Infection  Goal: Will remain free from infection  Outcome: PROGRESSING SLOWER THAN EXPECTED  ID following patient. Pt on rocephin and vanco. PICC ordered for 6 weeks IV abx    Problem: Pain Management  Goal: Pain level will decrease to patient's comfort goal  Outcome: PROGRESSING AS EXPECTED   Pt denies pain at this time

## 2019-12-05 NOTE — CARE PLAN
Problem: Pain Management  Goal: Pain level will decrease to patient's comfort goal  Outcome: PROGRESSING AS EXPECTED  Note:   Pain control with toradol and oxy PRN.     Problem: Infection  Goal: Will remain free from infection  Outcome: PROGRESSING AS EXPECTED  Note:   PICC line to be placed today. IV abx in use. Monitoring sx site, labs, vital signs.

## 2019-12-05 NOTE — PROCEDURES
Vascular Access Team     Date of Insertion: 12/5/2019  Arm Circumference: 34  Internal length: 46  External Length: 4  Vein Occupancy %: 27   Reason for PICC: Antibiotic therapy  Labs: WBC 7.4, , BUN 14, Cr 0.84, GFR >60, INR N/A     Consents confirmed, vessel patency confirmed with ultrasound. Risks and benefits of procedure explained to patient and education regarding central line associated bloodstream infections provided. Questions answered.      PICC placed in RUE per licensed provider order with ultrasound guidance.  4 Fr, Single lumen PICC placed in Basilic vein after 1 attempt(s). 2 mL of 1% lidocaine injected intradermally, 21 gauge microintroducer needle and modified Seldinger technique used. 46 cm catheter inserted with good blood return. Secured at 4 cm marker. Each lumen flushed without resistance with 10 mL 0.9% normal saline. PICC line secured with Biopatch and Tegaderm.     PICC tip placement location is confirmed by nurse to be in the Superior Vena Cava (SVC) utilizing 3CG technology. PICC line is appropriate for use at this time. Patient tolerated procedure well, without complications.  Patient condition relayed to unit RN or ordering physician via this post procedure note in the EMR.      Ultrasound images uploaded to PACS and viewable in the EMR - yes  Ultrasound imaged printed and placed in paper chart - no     BARD Power PICC ref # [id number], Lot # [id number]

## 2019-12-05 NOTE — PROGRESS NOTES
Infectious Disease Progress Note    Author: Marleny Moss M.D. Date & Time of service: 2019  12:31 PM    Chief Complaint:  FU for left ankle Hardware infection    Interval History:  44 y.o. woman with a history of rheumatoid arthritis and SLE on Plaquenil and methotrexate, and a history of a left ankle fracture in April status post ORIF followed by one screw removal in 2019 admitted for left ankle swelling, erythema and worsening pain.  Bone scan was indeterminant. S/p HW removal on  AF WBC 5.1 tired. S/p HWR this morning. Post op pain controlled.     AF WBC 7.4 more left ankle pain today after PT. Cx - NGTD. PICC placed  Pt states her injury is a workmans comp issue    Labs Reviewed.    Review of Systems:  Review of Systems   Constitutional: Negative for chills and fever.   Respiratory: Negative for cough and shortness of breath.    Gastrointestinal: Negative for abdominal pain, diarrhea, nausea and vomiting.   Genitourinary: Negative for dysuria.   Musculoskeletal: Positive for joint pain.   Neurological: Negative for dizziness and headaches.   All other systems reviewed and are negative.      Hemodynamics:  Temp (24hrs), Av °C (98.6 °F), Min:36.7 °C (98 °F), Max:37.2 °C (98.9 °F)  Temperature: 37 °C (98.6 °F)  Pulse  Av.8  Min: 66  Max: 99   Blood Pressure: 135/92       Physical Exam:  Physical Exam  Constitutional:       Appearance: Normal appearance.   HENT:      Head: Normocephalic and atraumatic.      Nose: Nose normal.      Mouth/Throat:      Mouth: Mucous membranes are moist.      Pharynx: No oropharyngeal exudate.   Eyes:      Extraocular Movements: Extraocular movements intact.      Conjunctiva/sclera: Conjunctivae normal.      Pupils: Pupils are equal, round, and reactive to light.   Neck:      Musculoskeletal: Normal range of motion and neck supple.   Cardiovascular:      Rate and Rhythm: Normal rate and regular rhythm.      Heart sounds: No murmur.   Pulmonary:       Effort: Pulmonary effort is normal.      Breath sounds: Normal breath sounds.   Abdominal:      Palpations: Abdomen is soft.      Tenderness: There is no tenderness.   Musculoskeletal:      Comments: Left ankle in primary surgical dressing. Toes warm and wiggles    RUE PICC - nontender, no surrounding erythema   Skin:     General: Skin is warm and dry.   Neurological:      General: No focal deficit present.      Mental Status: She is alert and oriented to person, place, and time.   Psychiatric:         Mood and Affect: Mood normal.         Behavior: Behavior normal.      Comments: Very pleasant         Meds:    Current Facility-Administered Medications:   •  MD Alert...Vancomycin per Pharmacy  •  cefTRIAXone (ROCEPHIN) IV  •  vancomycin  •  enoxaparin (LOVENOX) injection  •  [START ON 12/9/2019] methotrexate  •  lisinopril  •  hydroCHLOROthiazide  •  acetaminophen  •  Notify provider if pain remains uncontrolled **AND** Use the numeric rating scale (NRS-11) on regular floors and Critical-Care Pain Observation Tool (CPOT) on ICUs/Trauma to assess pain **AND** Pulse Ox (Oximetry) **AND** Pharmacy Consult Request **AND** If patient difficult to arouse and/or has respiratory depression, stop any opiates that are currently infusing and call a Rapid Response. **AND** oxyCODONE immediate-release **AND** oxyCODONE immediate-release **AND** morphine injection  •  cloNIDine  •  ondansetron  •  ondansetron  •  promethazine  •  promethazine  •  prochlorperazine  •  senna-docusate **AND** polyethylene glycol/lytes **AND** magnesium hydroxide **AND** bisacodyl  •  ketorolac    Labs:  Recent Labs     12/03/19  0523 12/04/19  0406 12/05/19  0419   WBC 4.8 5.1 7.4   RBC 4.28 4.25 4.00*   HEMOGLOBIN 11.0* 11.0* 10.1*   HEMATOCRIT 35.5* 35.0* 32.8*   MCV 82.9 82.4 82.0   MCH 25.7* 25.9* 25.3*   RDW 51.2* 49.5 50.1*   PLATELETCT 231 248 255   MPV 9.2 8.9* 8.9*   NEUTSPOLYS 68.20  --   --    LYMPHOCYTES 23.90  --   --    MONOCYTES  4.40  --   --    EOSINOPHILS 3.50  --   --    BASOPHILS 0.00  --   --    RBCMORPHOLO Normal  --   --      Recent Labs     12/03/19  0523 12/04/19  0406 12/05/19  1152   SODIUM 136 138 136   POTASSIUM 3.9 4.1 3.8   CHLORIDE 105 105 105   CO2 22 26 24   GLUCOSE 87 96 92   BUN 12 14 12     Recent Labs     12/03/19  0523 12/04/19  0406 12/05/19  1152   CREATININE 0.70 0.84 0.85       Imaging:  Dx-ankle 3+ Views Left    Addendum Date: 12/1/2019    Additional clinical history: Infection In light of the above clinical history there is some irregularity in the lateral malleolus cortex. Osteomyelitis is not excluded in this region. Further assessment could be performed with bone scan. Findings were discussed with DANIEL RUIZ II on 12/1/2019 10:04 PM.    Result Date: 12/1/2019 12/1/2019 8:13 PM HISTORY/REASON FOR EXAM:  Pain/Deformity Following Trauma; prior left ankle fracture. TECHNIQUE/EXAM DESCRIPTION AND NUMBER OF VIEWS:  3 views of the  LEFT ankle. COMPARISON: 4/8/2019 FINDINGS: MINERALIZATION: Mineralization is unremarkable for age. INJURY: There has been interval open reduction and internal fixation of the medial and lateral malleolar fractures. There is curvilinear deformity of the underlying bony structures. There is indistinctly visualized lucency extending through the posterior  malleolus. This is similar to the prior study and partially obscured by overlying hardware. There is no evidence of hardware loosening or failure. JOINTS: No erosive arthropathy is evident.  Ankle mortise is symmetric. Distal tibial fibular syndesmosis appears widened.     1.  Interval open reduction and internal fixation of lateral and medial malleoli fractures 2.  Indistinctly visualized posterior malleolus fracture, likely indicating some amount of incomplete bony fusion of the previously demonstrated posterior malleolar fracture site, though interval re-injury is not excluded 3.  Widened distal tibiofibular syndesmosis, similar  to prior    Nm-bone Scan(limited)/flow    Result Date: 12/2/2019 12/2/2019 1:08 PM HISTORY/REASON FOR EXAM:  Foot pain, initial exam; Left ankle cellulitis with concern for osteomyelitis TECHNIQUE/EXAM DESCRIPTION AND NUMBER OF VIEWS:  26.2 mCi Tc 99m-MDP was administered intravenously followed by three-phase imaging of the ankles and feet. COMPARISON:  X-ray 12/1/2019 FINDINGS:     The blood pool images demonstrate increased flow to the left ankle. The blood pool images demonstrate increased activity in the left ankle. The delayed images demonstrate increased activity in the left ankle the region of the distal tibia.     1.  There is increased activity on all 3 phases in the left ankle/distal tibial region . 2.  While this could represent osteomyelitis, the fact that there is fracture in this region based on x-rays, this could also simply represent cellulitis with bony remodeling causing the uptake on the delayed imaging.    Us-extremity Non Vascular Unilateral Left    Result Date: 12/1/2019 12/1/2019 9:29 PM HISTORY/REASON FOR EXAM:  Leukopenia, swelling over distal lateral LEFT leg at site of prior OriF TECHNIQUE/EXAM DESCRIPTION AND NUMBER OF VIEWS: The distal LEFT lateral leg was scanned with grayscale. Selected images were submitted for radiologist review. COMPARISON: None FINDINGS: There is diffuse edema in the soft tissues of the region of interest. No discrete fluid collection is seen.     Diffuse edema which could indicate cellulitis. No abscess visualized.      Micro:  Results     Procedure Component Value Units Date/Time    CULTURE WOUND W/ GRAM STAIN [[id number]] Collected:  12/04/19 0827    Order Status:  Completed Specimen:  Wound Updated:  12/05/19 1128     Significant Indicator NEG     Source WND     Site Left Ankle wound     Culture Result No growth at 24 hours.     Gram Stain Result Rare WBCs.  No organisms seen.      Narrative:       Surgery - swabs received    Anaerobic Culture [[id number]]  Collected:  12/04/19 0827    Order Status:  Completed Specimen:  Wound Updated:  12/05/19 1128     Significant Indicator NEG     Source WND     Site Left Ankle wound     Culture Result Culture in progress.    Narrative:       Surgery - swabs received    GRAM STAIN [[id number]] Collected:  12/04/19 0827    Order Status:  Completed Specimen:  Wound Updated:  12/04/19 1427     Significant Indicator .     Source WND     Site Left Ankle wound     Gram Stain Result Rare WBCs.  No organisms seen.      Narrative:       Surgery - swabs received          Assessment:  Active Hospital Problems    Diagnosis   • *Cellulitis of left lower extremity [L03.116]   • Rheumatoid arthritis (HCC) [M06.9]   • Other forms of systemic lupus erythematosus (HCC) [M32.8]     Plan:  Left ankle hardware infection  Underlying osteomyelitis  S/p hardware removal and I&D down to bone on 12/4/19 by Dr. Pitts  OR cultures - NGTD  Continue vanco and ceftriaxone  Monitor renal function and vanco trough  Plan for 6 weeks of IV abx for hardware infection and osteomyelitis from 12/4  Stop date 01/15/19  If cx remain negative, pt can do daptomycin 8 mg/kg daily and ceftriaxone 2 g daiy as outpatient    H/o left ankle fracture  S/p ORIF in April  Screw removal in August    Rheumatoid arthritis and SLE  On MTX and plaquenil     I have performed a physical exam and reviewed and updated ROS and plan today 12/5/2019.  In review of yesterday's note 12/4/2019, there are no changes except as documented above.    Dispo: Pt prefers home IV abx if feasible. Workman's comp to cover abx    FU ID clinic 2 weeks post discharge    Plan of care Discussed with internal medicine Dr. Sahu and

## 2019-12-05 NOTE — PROGRESS NOTES
Bedside report received.  Assessment complete.  A&O x 4. Patient calls appropriately.  Patient up with no assist.    Patient has 4/10 pain. Recently Medicated, Leg elevated.  Denies N&V. Tolerating regular diet.  Surgical incision to ankle is surgically dressed, dressing is CDI. CMS intact.  + void, - flatus, - BM.  Patient denies SOB.  Patient pleasant with staff and resting in bed.  Review plan with of care with patient. Call light and personal belongings with in reach. Hourly rounding in place. All needs met at this time.

## 2019-12-05 NOTE — DISCHARGE PLANNING
Anticipated Discharge Disposition: home with out patient infusion center ([facility])    Action: Discussed pt in IDT rounds. Pt to receive IV abx till 1/15/2020. PICC to be placed today. Cultures pending for specific abx orders.     Barriers to Discharge: cultures     Plan: [facility] once cultures result      Length of Stay: 4

## 2019-12-06 DIAGNOSIS — M86.9 OSTEOMYELITIS OF LEFT ANKLE, UNSPECIFIED TYPE (HCC): ICD-10-CM

## 2019-12-06 LAB
ERYTHROCYTE [DISTWIDTH] IN BLOOD BY AUTOMATED COUNT: 50.5 FL (ref 35.9–50)
HCT VFR BLD AUTO: 30.3 % (ref 37–47)
HGB BLD-MCNC: 9.5 G/DL (ref 12–16)
MCH RBC QN AUTO: 25.8 PG (ref 27–33)
MCHC RBC AUTO-ENTMCNC: 31.4 G/DL (ref 33.6–35)
MCV RBC AUTO: 82.3 FL (ref 81.4–97.8)
PLATELET # BLD AUTO: 238 K/UL (ref 164–446)
PMV BLD AUTO: 9.2 FL (ref 9–12.9)
RBC # BLD AUTO: 3.68 M/UL (ref 4.2–5.4)
WBC # BLD AUTO: 6.7 K/UL (ref 4.8–10.8)

## 2019-12-06 PROCEDURE — 700105 HCHG RX REV CODE 258: Performed by: NURSE PRACTITIONER

## 2019-12-06 PROCEDURE — 85027 COMPLETE CBC AUTOMATED: CPT

## 2019-12-06 PROCEDURE — 770006 HCHG ROOM/CARE - MED/SURG/GYN SEMI*

## 2019-12-06 PROCEDURE — 99232 SBSQ HOSP IP/OBS MODERATE 35: CPT | Performed by: STUDENT IN AN ORGANIZED HEALTH CARE EDUCATION/TRAINING PROGRAM

## 2019-12-06 PROCEDURE — 700111 HCHG RX REV CODE 636 W/ 250 OVERRIDE (IP): Performed by: NURSE PRACTITIONER

## 2019-12-06 PROCEDURE — 700111 HCHG RX REV CODE 636 W/ 250 OVERRIDE (IP): Performed by: HOSPITALIST

## 2019-12-06 PROCEDURE — 99233 SBSQ HOSP IP/OBS HIGH 50: CPT | Performed by: INTERNAL MEDICINE

## 2019-12-06 PROCEDURE — A9270 NON-COVERED ITEM OR SERVICE: HCPCS | Performed by: HOSPITALIST

## 2019-12-06 PROCEDURE — 700105 HCHG RX REV CODE 258: Performed by: HOSPITALIST

## 2019-12-06 PROCEDURE — 700102 HCHG RX REV CODE 250 W/ 637 OVERRIDE(OP): Performed by: HOSPITALIST

## 2019-12-06 RX ORDER — 0.9 % SODIUM CHLORIDE 0.9 %
10-20 VIAL (ML) INJECTION PRN
Status: CANCELLED | OUTPATIENT
Start: 2019-12-06

## 2019-12-06 RX ORDER — 0.9 % SODIUM CHLORIDE 0.9 %
5 VIAL (ML) INJECTION PRN
Status: CANCELLED | OUTPATIENT
Start: 2019-12-06

## 2019-12-06 RX ADMIN — VANCOMYCIN HYDROCHLORIDE 1500 MG: 500 INJECTION, POWDER, LYOPHILIZED, FOR SOLUTION INTRAVENOUS at 12:32

## 2019-12-06 RX ADMIN — OXYCODONE HYDROCHLORIDE 5 MG: 5 TABLET ORAL at 18:33

## 2019-12-06 RX ADMIN — OXYCODONE HYDROCHLORIDE 5 MG: 5 TABLET ORAL at 12:38

## 2019-12-06 RX ADMIN — CEFTRIAXONE SODIUM 2 G: 2 INJECTION, POWDER, FOR SOLUTION INTRAMUSCULAR; INTRAVENOUS at 06:43

## 2019-12-06 RX ADMIN — SENNOSIDES AND DOCUSATE SODIUM 2 TABLET: 8.6; 5 TABLET ORAL at 06:45

## 2019-12-06 RX ADMIN — HYDROCHLOROTHIAZIDE 25 MG: 25 TABLET ORAL at 06:45

## 2019-12-06 RX ADMIN — ENOXAPARIN SODIUM 40 MG: 100 INJECTION SUBCUTANEOUS at 06:45

## 2019-12-06 RX ADMIN — KETOROLAC TROMETHAMINE 30 MG: 30 INJECTION, SOLUTION INTRAMUSCULAR at 06:45

## 2019-12-06 RX ADMIN — MAGNESIUM HYDROXIDE 30 ML: 400 SUSPENSION ORAL at 06:45

## 2019-12-06 RX ADMIN — VANCOMYCIN HYDROCHLORIDE 1500 MG: 500 INJECTION, POWDER, LYOPHILIZED, FOR SOLUTION INTRAVENOUS at 01:12

## 2019-12-06 RX ADMIN — POLYETHYLENE GLYCOL 3350 1 PACKET: 17 POWDER, FOR SOLUTION ORAL at 06:42

## 2019-12-06 RX ADMIN — LISINOPRIL 10 MG: 10 TABLET ORAL at 06:45

## 2019-12-06 ASSESSMENT — ENCOUNTER SYMPTOMS
CHILLS: 0
COUGH: 0
FALLS: 0
ABDOMINAL PAIN: 0
DIARRHEA: 0
HEADACHES: 0
SHORTNESS OF BREATH: 0
PALPITATIONS: 0
FEVER: 0
NAUSEA: 0
VOMITING: 0
BACK PAIN: 0
WEAKNESS: 0
DIZZINESS: 0
MYALGIAS: 0
DIAPHORESIS: 0

## 2019-12-06 NOTE — PROGRESS NOTES
"Pharmacy Kinetics 44 y.o. female on vancomycin day # 3 2019    Currently on Vancomycin 1500 mg iv q12hr (, 13)  Duration of therapy: 6 weeks (end 1/15/20)     Indication for Treatment: Osteomyelitis     Pertinent history per medical record: Admitted on 2019 for ankle pain. Patient has a history of ORIF on left ankle fracture on 2019 followed by screw removal in 2019. Patient with acute swelling, pain and erythema. Patient went to the OR on 19 with left hardware removal, I&D of skin, subcutaneous tissue, muscle and bone. ID has been consulted and will further recommend duration of therapy and abx selection based of off hospital course and cultures.      Other antibiotics: ceftriaxone 2 g IV q24h     Allergies: Patient has no known allergies.      List concerns for renal function: obesity (BMI 37), Toradol      Pertinent cultures to date:   Current cultures NGTD    Recent Labs     19  0406 19  0419 19  0119   WBC 5.1 7.4 6.7     Recent Labs     19  0406 19  1152   BUN 14 12   CREATININE 0.84 0.85     Recent Labs     19  1152   VANCORANDOM 17.2       Intake/Output Summary (Last 24 hours) at 2019 0803  Last data filed at 2019 0713  Gross per 24 hour   Intake 1290 ml   Output --   Net 1290 ml      /86   Pulse 87   Temp 37.6 °C (99.7 °F) (Temporal)   Resp 17   Ht 1.715 m (5' 7.5\")   Wt 110.1 kg (242 lb 11.6 oz)   SpO2 96%  Temp (24hrs), Av.9 °C (98.4 °F), Min:36.2 °C (97.1 °F), Max:37.6 °C (99.7 °F)      A/P   1. Vancomycin dose change: none  2. Next vancomycin level: 12/8 at 0000; ordered  3. Goal trough: 12-16 mcg/mL  4. Comments: No new culture results with anticipated 6 week treatment duration for osteomyelitis per ID; plan for daptomycin/ceftriaxone at discharge.  Renal indices stable, afebrile overnight, WBC WNL.  Will assess trough at steady state.  PICC line already placed.    Alan Noriega, PharmD, BCPS        "

## 2019-12-06 NOTE — PROGRESS NOTES
"Pt A&O x4.    Vitals: /97   Pulse 88   Temp 36.9 °C (98.5 °F) (Temporal)   Resp 17   Ht 1.715 m (5' 7.5\")   Wt 110.1 kg (242 lb 11.6 oz)   LMP 11/17/2019 (Approximate)   SpO2 98%   Breastfeeding? No   BMI 37.46 kg/m²     Pt rates pain 8 out of 10. Medicated for pain. Declines ice packs, pt states she is already cold.     Neuro: HAMILTON. Numbness in LLE, however pt can feel touch and temperature on her toes.     Cardiac: Denies new onset of chest pain.    Vascular: Pulses 2+ BUE, 2+ RLE, HENRY LLE pedal pulse due to dressing. 1+ edema noted to LLE.    Respiratory: Lungs sound diminished in bases. On RA. Denies SOB.    GI: Abdomen soft, obese. Normoactive bowel sounds, + flatus, - BM today. On regular diet, tolerating well. - nausea/ vomiting. Pt has intermittent appetite loss when pain is not well controlled.     : Pt voiding adequately.      MSK: Pt up to bathroom SBA using FWW, tolerating well. LLE WBAT, pt bears minimal weight at this time due to pain.     Integumentary: LLE surgical site dressing CDI, observed. LLE elevated on pillows.     Labs noted. Single lumen RUE PICC line in place, dressing CDI, line patent, no s/ sx of infection noted at this time.     Fall precautions in place: Bed locked in lowest position, Upper bed rails up, treaded socks in place, personal belongings within reach, call light within reach, appropriate mobility signs in place, - bed alarm. Pt calls appropriately.     Pt updated on POC.   "

## 2019-12-06 NOTE — PROGRESS NOTES
"   Orthopaedic Progress Note    Interval changes:  Patient doing well post op  Cultures pending   ID team following for recs    ROS - Patient denies any new issues.  Pain well controlled.    /100   Pulse 90   Temp 36.2 °C (97.1 °F) (Temporal)   Resp 18   Ht 1.715 m (5' 7.5\")   Wt 110.1 kg (242 lb 11.6 oz)   SpO2 98%       Patient seen and examined  No acute distress  Breathing non labored  RRR  LLE dressing CDI, DNVI, moves all toes, cap refill <2 sec.      Recent Labs     12/03/19  0523 12/04/19  0406 12/05/19  0419   WBC 4.8 5.1 7.4   RBC 4.28 4.25 4.00*   HEMOGLOBIN 11.0* 11.0* 10.1*   HEMATOCRIT 35.5* 35.0* 32.8*   MCV 82.9 82.4 82.0   MCH 25.7* 25.9* 25.3*   MCHC 31.0* 31.4* 30.8*   RDW 51.2* 49.5 50.1*   PLATELETCT 231 248 255   MPV 9.2 8.9* 8.9*       Active Hospital Problems    Diagnosis   • Cellulitis of left lower extremity [L03.116]     Priority: High   • Rheumatoid arthritis (HCC) [M06.9]     Priority: Low   • Other forms of systemic lupus erythematosus (HCC) [M32.8]     Priority: Low   • Essential hypertension [I10]     Priority: Low       Assessment/Plan:  Patient doing well  POD#1 S/P:  Hardware removal, left ankle and irrigation and debridement, skin, subcutaneous tissue, muscle, and bone.  Wt bearing status - WBAT  Wound care/Drains - dressings changed every other day by nursing  Future Procedures - none planned   Lovenox: Start 12/4, Duration-until ambulatory > 150'  Sutures/Staples out- 10-14 days post operatively  PT/OT-initiated  Antibiotics: rocephin 2g IV QD  DVT Prophylaxis- TEDS/SCDs/Foot pumps  Hooker-none  Case Coordination for Discharge Planning - Disposition pending abx needs   "

## 2019-12-06 NOTE — DISCHARGE PLANNING
Anticipated Discharge Disposition: Home with [facility]    Action: Dr. Moss, ID, requesting insurance auth for prescribed IV abx through pts workmen's comp. Contacted pts , Jean Marie Maya [phone], whom stated pts abx will be approved along with the [facility].     Barriers to Discharge: IV abx orders sent to [facility]    Plan: Will call to set up first [facility] appointment once abx have been sent     Length of Stay: 5

## 2019-12-06 NOTE — DISCHARGE PLANNING
Contacted [facility] ([facility] at [phone]. [facility] stating they have not yet received Dr. Orellana (ID) orders despite documentation. Communication left for weekend  to follow up.

## 2019-12-06 NOTE — PROGRESS NOTES
Infectious Disease Progress Note    Author: Marleny Moss M.D. Date & Time of service: 2019  3:22 PM    Chief Complaint:  FU for left ankle Hardware infection    Interval History:  44 y.o. woman with a history of rheumatoid arthritis and SLE on Plaquenil and methotrexate, and a history of a left ankle fracture in April status post ORIF followed by one screw removal in 2019 admitted for left ankle swelling, erythema and worsening pain.  Bone scan was indeterminant. S/p HW removal on  AF WBC 5.1 tired. S/p HWR this morning. Post op pain controlled.     AF WBC 7.4 more left ankle pain today after PT. Cx - NGTD. PICC placed  Pt states her injury is a workmans comp issue   T-max 99.7 WBC 6.7.patient's ankle pain is stable.  Workmen's Comp. will pay for outpatient infusions as the patient is not homebound.    Labs Reviewed.    Review of Systems:  Review of Systems   Constitutional: Negative for chills and fever.   Respiratory: Negative for cough and shortness of breath.    Gastrointestinal: Negative for abdominal pain, diarrhea, nausea and vomiting.   Genitourinary: Negative for dysuria.   Musculoskeletal: Positive for joint pain.   Neurological: Negative for dizziness and headaches.   All other systems reviewed and are negative.      Hemodynamics:  Temp (24hrs), Av.9 °C (98.5 °F), Min:36.2 °C (97.1 °F), Max:37.6 °C (99.7 °F)  Temperature: 37.1 °C (98.8 °F)  Pulse  Av.5  Min: 66  Max: 99   Blood Pressure: 140/107       Physical Exam:  Physical Exam  Constitutional:       Appearance: Normal appearance.   HENT:      Head: Normocephalic and atraumatic.      Nose: Nose normal.      Mouth/Throat:      Mouth: Mucous membranes are moist.      Pharynx: No oropharyngeal exudate.   Eyes:      Extraocular Movements: Extraocular movements intact.      Conjunctiva/sclera: Conjunctivae normal.      Pupils: Pupils are equal, round, and reactive to light.   Neck:      Musculoskeletal: Normal  range of motion and neck supple.   Cardiovascular:      Rate and Rhythm: Normal rate and regular rhythm.      Heart sounds: No murmur.   Pulmonary:      Effort: Pulmonary effort is normal.      Breath sounds: Normal breath sounds.   Abdominal:      Palpations: Abdomen is soft.      Tenderness: There is no tenderness.   Musculoskeletal:      Comments: Left ankle in primary surgical dressing. Toes warm and wiggles    RUE PICC - nontender, no surrounding erythema   Skin:     General: Skin is warm and dry.   Neurological:      General: No focal deficit present.      Mental Status: She is alert and oriented to person, place, and time.   Psychiatric:         Mood and Affect: Mood normal.         Behavior: Behavior normal.      Comments: Very pleasant         Meds:    Current Facility-Administered Medications:   •  vancomycin  •  MD Alert...Vancomycin per Pharmacy  •  cefTRIAXone (ROCEPHIN) IV  •  enoxaparin (LOVENOX) injection  •  [START ON 12/9/2019] methotrexate  •  lisinopril  •  hydroCHLOROthiazide  •  acetaminophen  •  Notify provider if pain remains uncontrolled **AND** Use the numeric rating scale (NRS-11) on regular floors and Critical-Care Pain Observation Tool (CPOT) on ICUs/Trauma to assess pain **AND** Pulse Ox (Oximetry) **AND** Pharmacy Consult Request **AND** If patient difficult to arouse and/or has respiratory depression, stop any opiates that are currently infusing and call a Rapid Response. **AND** oxyCODONE immediate-release **AND** oxyCODONE immediate-release **AND** morphine injection  •  cloNIDine  •  ondansetron  •  ondansetron  •  promethazine  •  promethazine  •  prochlorperazine  •  senna-docusate **AND** polyethylene glycol/lytes **AND** magnesium hydroxide **AND** bisacodyl  •  ketorolac    Labs:  Recent Labs     12/04/19  0406 12/05/19  0419 12/06/19  0119   WBC 5.1 7.4 6.7   RBC 4.25 4.00* 3.68*   HEMOGLOBIN 11.0* 10.1* 9.5*   HEMATOCRIT 35.0* 32.8* 30.3*   MCV 82.4 82.0 82.3   MCH 25.9*  25.3* 25.8*   RDW 49.5 50.1* 50.5*   PLATELETCT 248 255 238   MPV 8.9* 8.9* 9.2     Recent Labs     12/04/19  0406 12/05/19  1152   SODIUM 138 136   POTASSIUM 4.1 3.8   CHLORIDE 105 105   CO2 26 24   GLUCOSE 96 92   BUN 14 12     Recent Labs     12/04/19  0406 12/05/19  1152   CREATININE 0.84 0.85       Imaging:  Dx-ankle 3+ Views Left    Addendum Date: 12/1/2019    Additional clinical history: Infection In light of the above clinical history there is some irregularity in the lateral malleolus cortex. Osteomyelitis is not excluded in this region. Further assessment could be performed with bone scan. Findings were discussed with DANIEL RUIZ II on 12/1/2019 10:04 PM.    Result Date: 12/1/2019 12/1/2019 8:13 PM HISTORY/REASON FOR EXAM:  Pain/Deformity Following Trauma; prior left ankle fracture. TECHNIQUE/EXAM DESCRIPTION AND NUMBER OF VIEWS:  3 views of the  LEFT ankle. COMPARISON: 4/8/2019 FINDINGS: MINERALIZATION: Mineralization is unremarkable for age. INJURY: There has been interval open reduction and internal fixation of the medial and lateral malleolar fractures. There is curvilinear deformity of the underlying bony structures. There is indistinctly visualized lucency extending through the posterior  malleolus. This is similar to the prior study and partially obscured by overlying hardware. There is no evidence of hardware loosening or failure. JOINTS: No erosive arthropathy is evident.  Ankle mortise is symmetric. Distal tibial fibular syndesmosis appears widened.     1.  Interval open reduction and internal fixation of lateral and medial malleoli fractures 2.  Indistinctly visualized posterior malleolus fracture, likely indicating some amount of incomplete bony fusion of the previously demonstrated posterior malleolar fracture site, though interval re-injury is not excluded 3.  Widened distal tibiofibular syndesmosis, similar to prior    Nm-bone Scan(limited)/flow    Result Date: 12/2/2019 12/2/2019  1:08 PM HISTORY/REASON FOR EXAM:  Foot pain, initial exam; Left ankle cellulitis with concern for osteomyelitis TECHNIQUE/EXAM DESCRIPTION AND NUMBER OF VIEWS:  26.2 mCi Tc 99m-MDP was administered intravenously followed by three-phase imaging of the ankles and feet. COMPARISON:  X-ray 12/1/2019 FINDINGS:     The blood pool images demonstrate increased flow to the left ankle. The blood pool images demonstrate increased activity in the left ankle. The delayed images demonstrate increased activity in the left ankle the region of the distal tibia.     1.  There is increased activity on all 3 phases in the left ankle/distal tibial region . 2.  While this could represent osteomyelitis, the fact that there is fracture in this region based on x-rays, this could also simply represent cellulitis with bony remodeling causing the uptake on the delayed imaging.    Us-extremity Non Vascular Unilateral Left    Result Date: 12/1/2019 12/1/2019 9:29 PM HISTORY/REASON FOR EXAM:  Leukopenia, swelling over distal lateral LEFT leg at site of prior OriF TECHNIQUE/EXAM DESCRIPTION AND NUMBER OF VIEWS: The distal LEFT lateral leg was scanned with grayscale. Selected images were submitted for radiologist review. COMPARISON: None FINDINGS: There is diffuse edema in the soft tissues of the region of interest. No discrete fluid collection is seen.     Diffuse edema which could indicate cellulitis. No abscess visualized.      Micro:  Results     Procedure Component Value Units Date/Time    Anaerobic Culture [[id number]] Collected:  12/04/19 0827    Order Status:  Completed Specimen:  Wound Updated:  12/06/19 0757     Significant Indicator NEG     Source WND     Site Left Ankle wound     Culture Result Culture in progress.    Narrative:       Surgery - swabs received    CULTURE WOUND W/ GRAM STAIN [[id number]] Collected:  12/04/19 0827    Order Status:  Completed Specimen:  Wound Updated:  12/06/19 0757     Significant Indicator NEG     Source  WND     Site Left Ankle wound     Culture Result No growth at 48 hours.     Gram Stain Result Rare WBCs.  No organisms seen.      Narrative:       Surgery - swabs received    GRAM STAIN [[id number]] Collected:  12/04/19 0827    Order Status:  Completed Specimen:  Wound Updated:  12/04/19 1427     Significant Indicator .     Source WND     Site Left Ankle wound     Gram Stain Result Rare WBCs.  No organisms seen.      Narrative:       Surgery - swabs received          Assessment:  Active Hospital Problems    Diagnosis   • *Cellulitis of left lower extremity [L03.116]   • Rheumatoid arthritis (HCC) [M06.9]   • Other forms of systemic lupus erythematosus (HCC) [M32.8]     Plan:  Left ankle hardware infection  Underlying osteomyelitis  S/p hardware removal and I&D down to bone on 12/4/19 by Dr. Pitts  OR cultures - NGTD  Continue vanco and ceftriaxone  Monitor renal function and vanco trough  Vanco trough 17.2 on 12/5  Plan for 6 weeks of IV abx for hardware infection and osteomyelitis from 12/4  Stop date 01/15/19  Can do daptomycin 8 mg/kg daily and ceftriaxone 2 g daiy as outpatient  [facility] orders done today  Give 1 dose of daptomycin 8 mg/kg on day of discharge    H/o left ankle fracture  S/p ORIF in April  Screw removal in August     Rheumatoid arthritis and SLE  On MTX and plaquenil     I have performed a physical exam and reviewed and updated ROS and plan today 12/6/2019.  In review of yesterday's note 12/5/2019, there are no changes except as documented above.    Dispo: [facility].  Okay to discharge patient once outpatient antibiotics have been arranged.    FU ID clinic 2 weeks post discharge    Plan of care Discussed with internal medicine Dr. Sahu and

## 2019-12-06 NOTE — CARE PLAN
Problem: Venous Thromboembolism (VTW)/Deep Vein Thrombosis (DVT) Prevention:  Goal: Patient will participate in Venous Thrombosis (VTE)/Deep Vein Thrombosis (DVT)Prevention Measures  Outcome: PROGRESSING AS EXPECTED  Flowsheets  Taken 12/5/2019 2000 by Josh Mcfarland R.N.  Risk Assessment Score: 2  VTE RISK: Moderate  Pharmacologic Prophylaxis Used: LMWH: Enoxaparin(Lovenox)  Taken 12/6/2019 0129 by Nicki Joseph C.N.AJos  Mechanical Prophylaxis : SCDs, Sequential Compression Device  SCDs, Sequential Compression Device: On     Problem: Bowel/Gastric:  Goal: Will not experience complications related to bowel motility  Outcome: PROGRESSING SLOWER THAN EXPECTED  Intervention: Implement Bowel Protocol, if applicable  Note:   Pt has not had a BM since prior to arrival, escalating bowel protocol.      Problem: Mobility  Goal: Risk for activity intolerance will decrease  Outcome: PROGRESSING AS EXPECTED  Note:   Pt is up and ambulating mostly independently with a FWW, tolerating well, self motivated.

## 2019-12-06 NOTE — PROGRESS NOTES
Pt A&Ox4.  States pain better controlled this AM, declines intervention.   Surgical dressing in place, CMS in place.   Tolerating diet, denies n/v. + bowel sounds, + flatus, LBM this AM 12/6 after advancement of bowel protocol last night.  Saturating >90% on RA.  Pt ambulates independently, steady gait.  Updated on plan of care. Safety education provided. Bed locked in low. Call light within reach. Rounding in place.

## 2019-12-06 NOTE — CARE PLAN
Problem: Discharge Barriers/Planning  Goal: Patient's continuum of care needs will be met  12/6/2019 0945 by Marli Condon R.N.  Outcome: PROGRESSING AS EXPECTED  Note:   Awaiting final culture results for ID recs to set up outpatient abx.   Problem: Pain Management  Goal: Pain level will decrease to patient's comfort goal  Outcome: PROGRESSING AS EXPECTED  Note:   Pain control with toradol and oxy PRN.     Problem: Infection  Goal: Will remain free from infection  Outcome: PROGRESSING AS EXPECTED  Note:   IV abx in use. Monitoring labs, vitals.

## 2019-12-07 LAB
BACTERIA WND AEROBE CULT: NORMAL
GRAM STN SPEC: NORMAL
SIGNIFICANT IND 70042: NORMAL
SITE SITE: NORMAL
SOURCE SOURCE: NORMAL

## 2019-12-07 PROCEDURE — 700105 HCHG RX REV CODE 258: Performed by: HOSPITALIST

## 2019-12-07 PROCEDURE — 99232 SBSQ HOSP IP/OBS MODERATE 35: CPT | Performed by: STUDENT IN AN ORGANIZED HEALTH CARE EDUCATION/TRAINING PROGRAM

## 2019-12-07 PROCEDURE — 700102 HCHG RX REV CODE 250 W/ 637 OVERRIDE(OP): Performed by: HOSPITALIST

## 2019-12-07 PROCEDURE — 700105 HCHG RX REV CODE 258: Performed by: NURSE PRACTITIONER

## 2019-12-07 PROCEDURE — 700111 HCHG RX REV CODE 636 W/ 250 OVERRIDE (IP): Performed by: HOSPITALIST

## 2019-12-07 PROCEDURE — 770006 HCHG ROOM/CARE - MED/SURG/GYN SEMI*

## 2019-12-07 PROCEDURE — 700111 HCHG RX REV CODE 636 W/ 250 OVERRIDE (IP): Performed by: NURSE PRACTITIONER

## 2019-12-07 PROCEDURE — A9270 NON-COVERED ITEM OR SERVICE: HCPCS | Performed by: HOSPITALIST

## 2019-12-07 PROCEDURE — 99232 SBSQ HOSP IP/OBS MODERATE 35: CPT | Performed by: INTERNAL MEDICINE

## 2019-12-07 RX ADMIN — VANCOMYCIN HYDROCHLORIDE 1500 MG: 500 INJECTION, POWDER, LYOPHILIZED, FOR SOLUTION INTRAVENOUS at 01:36

## 2019-12-07 RX ADMIN — LISINOPRIL 10 MG: 10 TABLET ORAL at 06:43

## 2019-12-07 RX ADMIN — VANCOMYCIN HYDROCHLORIDE 1500 MG: 500 INJECTION, POWDER, LYOPHILIZED, FOR SOLUTION INTRAVENOUS at 13:02

## 2019-12-07 RX ADMIN — OXYCODONE HYDROCHLORIDE 5 MG: 5 TABLET ORAL at 06:49

## 2019-12-07 RX ADMIN — OXYCODONE HYDROCHLORIDE 5 MG: 5 TABLET ORAL at 13:02

## 2019-12-07 RX ADMIN — OXYCODONE HYDROCHLORIDE 5 MG: 5 TABLET ORAL at 22:09

## 2019-12-07 RX ADMIN — ENOXAPARIN SODIUM 40 MG: 100 INJECTION SUBCUTANEOUS at 06:44

## 2019-12-07 RX ADMIN — HYDROCHLOROTHIAZIDE 25 MG: 25 TABLET ORAL at 06:43

## 2019-12-07 RX ADMIN — CEFTRIAXONE SODIUM 2 G: 2 INJECTION, POWDER, FOR SOLUTION INTRAMUSCULAR; INTRAVENOUS at 06:43

## 2019-12-07 ASSESSMENT — ENCOUNTER SYMPTOMS
PALPITATIONS: 0
DIZZINESS: 0
DIAPHORESIS: 0
COUGH: 0
BACK PAIN: 0
CHILLS: 0
NAUSEA: 0
FALLS: 0
WEAKNESS: 0
ABDOMINAL PAIN: 0
DIARRHEA: 0
FEVER: 0
HEADACHES: 0
SHORTNESS OF BREATH: 0
MYALGIAS: 0
VOMITING: 0

## 2019-12-07 NOTE — CARE PLAN
Problem: Venous Thromboembolism (VTW)/Deep Vein Thrombosis (DVT) Prevention:  Goal: Patient will participate in Venous Thrombosis (VTE)/Deep Vein Thrombosis (DVT)Prevention Measures  Outcome: PROGRESSING AS EXPECTED  Flowsheets (Taken 12/6/2019 2000)  Risk Assessment Score: 2  VTE RISK: Moderate  Pharmacologic Prophylaxis Used: LMWH: Enoxaparin(Lovenox)     Problem: Bowel/Gastric:  Goal: Normal bowel function is maintained or improved  Outcome: PROGRESSING AS EXPECTED  Flowsheets (Taken 12/6/2019 2000)  Last BM: 12/06/19  Number of Times Stooled: 2     Problem: Pain Management  Goal: Pain level will decrease to patient's comfort goal  Outcome: PROGRESSING AS EXPECTED  Note:   Pt reports pain has decreased since yesterday.

## 2019-12-07 NOTE — PROGRESS NOTES
"Pharmacy Kinetics 44 y.o. female on vancomycin day # 4 2019    Currently on Vancomycin 1500 mg iv q12hr (, 13)  Duration of therapy: 6 weeks (end 1/15/20)     Indication for Treatment: Osteomyelitis     Pertinent history per medical record: Admitted on 2019 for ankle pain. Patient has a history of ORIF on left ankle fracture on 2019 followed by screw removal in 2019. Patient with acute swelling, pain and erythema. Patient went to the OR on 19 with left hardware removal, I&D of skin, subcutaneous tissue, muscle and bone. ID has been consulted and will further recommend duration of therapy and abx selection based of off hospital course and cultures.      Other antibiotics: ceftriaxone 2 g IV q24h     Allergies: Patient has no known allergies.      List concerns for renal function: obesity (BMI 37), Toradol      Pertinent cultures to date:   Current cultures NGTD    Recent Labs     19  0419 19  0119   WBC 7.4 6.7     Recent Labs     19  1152   BUN 12   CREATININE 0.85     Recent Labs     19  1152   VANCORANDOM 17.2       Intake/Output Summary (Last 24 hours) at 2019 0755  Last data filed at 2019 0400  Gross per 24 hour   Intake 1740 ml   Output --   Net 1740 ml      /97   Pulse 95   Temp 37.3 °C (99.1 °F) (Temporal)   Resp 16   Ht 1.715 m (5' 7.5\")   Wt 110.1 kg (242 lb 11.6 oz)   SpO2 96%  Temp (24hrs), Av.1 °C (98.8 °F), Min:36.8 °C (98.3 °F), Max:37.3 °C (99.1 °F)      A/P   1. Vancomycin dose change: none  2. Next vancomycin level: 12/8 at 0000; ordered  3. Goal trough: 12-16 mcg/mL  4. Comments: Plan remains for daptomycin and ceftriaxone at discharge. PICC line in place; plan for [facility].  Afebrile overnight.  Will assess trough in AM if patient remains admitted.    Alan Noriega, AlexisD, BCPS        "

## 2019-12-07 NOTE — PROGRESS NOTES
"Pt A&O x4. Boyfriend at bedside.     Vitals: /98   Pulse 95   Temp 37.2 °C (99 °F) (Temporal)   Resp 17   Ht 1.715 m (5' 7.5\")   Wt 110.1 kg (242 lb 11.6 oz)   LMP 11/17/2019 (Approximate)   SpO2 98%   Breastfeeding? No   BMI 37.46 kg/m²     Pt rates pain 3 out of 10 after recently receiving pain medication. Pt was open to trying an ice pack, placed to LLE ankle. Pt states the pain has decreased since yesterday.     Neuro: HAMILTON. Numbness to LLE ankle has resolved.     Cardiac: Denies new onset of chest pain.    Vascular: Pulses 2+ BUE, 2+ BLE. 2+ edema noted to LLE ankle.    Respiratory: Lungs sound diminished in bases. On RA. Denies SOB.    GI: Abdomen soft, obese. Normoactive bowel sounds, + flatus, + BM today. On regular diet, tolerating well. - nausea/ vomiting.    : Pt voiding adequately.      MSK: Pt up to bathroom SBA using FWW, tolerating well. LLE WBAT.     Integumentary: LLE lateral ankle surgical site dressing CDI, observed, no drainage noted. LLE elevated on pillows.     Labs noted. Single lumen RUE PICC line in place, dressing CDI, line patent, no s/ sx of infection noted at this time.     Fall precautions in place: Bed locked in lowest position, Upper bed rails up, treaded socks in place, personal belongings within reach, call light within reach, appropriate mobility signs in place, - bed alarm. Pt calls appropriately.     Pt updated on POC.   "

## 2019-12-07 NOTE — PROGRESS NOTES
"   Orthopaedic Progress Note    Interval changes:  Patient doing well   ID team following for recs  Dressing changed incision without issue    ROS - Patient denies any new issues.  Pain well controlled.    /107   Pulse 96   Temp 37.1 °C (98.8 °F) (Temporal)   Resp 15   Ht 1.715 m (5' 7.5\")   Wt 110.1 kg (242 lb 11.6 oz)   SpO2 97%       Patient seen and examined  No acute distress  Breathing non labored  RRR  LLE dressing changed, surgical incision is well approximated and is dry and clean.  There is no erythema, induration, or signs of infection, DNVI, moves all toes, cap refill <2 sec.      Recent Labs     12/04/19  0406 12/05/19  0419 12/06/19  0119   WBC 5.1 7.4 6.7   RBC 4.25 4.00* 3.68*   HEMOGLOBIN 11.0* 10.1* 9.5*   HEMATOCRIT 35.0* 32.8* 30.3*   MCV 82.4 82.0 82.3   MCH 25.9* 25.3* 25.8*   MCHC 31.4* 30.8* 31.4*   RDW 49.5 50.1* 50.5*   PLATELETCT 248 255 238   MPV 8.9* 8.9* 9.2       Active Hospital Problems    Diagnosis   • Cellulitis of left lower extremity [L03.116]     Priority: High   • Rheumatoid arthritis (HCC) [M06.9]     Priority: Low   • Other forms of systemic lupus erythematosus (HCC) [M32.8]     Priority: Low   • Essential hypertension [I10]     Priority: Low       Assessment/Plan:  Patient doing well  POD#2 S/P:  Hardware removal, left ankle and irrigation and debridement, skin, subcutaneous tissue, muscle, and bone.  Wt bearing status - WBAT  Wound care/Drains - dressings changed every other day by nursing  Future Procedures - none planned   Lovenox: Start 12/4, Duration-until ambulatory > 150'  Sutures/Staples out- 10-14 days post operatively  PT/OT-initiated  Antibiotics: rocephin 2g IV QD  DVT Prophylaxis- TEDS/SCDs/Foot pumps  Hooker-none  Case Coordination for Discharge Planning - Disposition pending abx needs   "

## 2019-12-07 NOTE — PROGRESS NOTES
Bedside report received.  Assessment complete.  A&O x 4. Patient calls appropriately.  Patient up self and steady.   Patient has minimal pain at this time.   Denies N&V. Tolerating diet.  Surgical site to LLE, at ankle. Dressing is CDI.  + void, + flatus, 12/6 BM.  Patient denies SOB.  Review plan with of care with patient. Call light and personal belongings with in reach. Hourly rounding in place. All needs met at this time.

## 2019-12-07 NOTE — CARE PLAN
Problem: Safety  Goal: Will remain free from injury  Outcome: PROGRESSING AS EXPECTED  Note:   Patient free from accidental injury at this time. Safety precautions in place. Hourly rounding in place.      Problem: Pain Management  Goal: Pain level will decrease to patient's comfort goal  Outcome: PROGRESSING AS EXPECTED  Note:   Patient experiencing pain relief with MAR medication and repositioning. Patient encouraged to call if pain worsens. Hourly rounding in place.

## 2019-12-07 NOTE — PROGRESS NOTES
Infectious Disease Progress Note    Author: Marleny Moss M.D. Date & Time of service: 2019  12:03 PM    Chief Complaint:  FU for left ankle Hardware infection    Interval History:  44 y.o. woman with a history of rheumatoid arthritis and SLE on Plaquenil and methotrexate, and a history of a left ankle fracture in April status post ORIF followed by one screw removal in 2019 admitted for left ankle swelling, erythema and worsening pain.  Bone scan was indeterminant. S/p HW removal on  AF WBC 5.1 tired. S/p HWR this morning. Post op pain controlled.     AF WBC 7.4 more left ankle pain today after PT. Cx - NGTD. PICC placed  Pt states her injury is a workmans comp issue   T-max 99.7 WBC 6.7.patient's ankle pain is stable.  Workmen's Comp. will pay for outpatient infusions as the patient is not homebound.   afebrile.  Patient sitting up in the chair.  She is ambulating the halls and does have some left ankle pain and stiffness.  She is awaiting for outpatient infusion appointments to be set up    Labs Reviewed.    Review of Systems:  Review of Systems   Constitutional: Negative for chills and fever.   Respiratory: Negative for cough and shortness of breath.    Gastrointestinal: Negative for abdominal pain, diarrhea, nausea and vomiting.   Genitourinary: Negative for dysuria.   Musculoskeletal: Positive for joint pain.        Left ankle stiffness   Neurological: Negative for dizziness and headaches.   All other systems reviewed and are negative.      Hemodynamics:  Temp (24hrs), Av °C (98.6 °F), Min:36.7 °C (98.1 °F), Max:37.3 °C (99.1 °F)  Temperature: 36.7 °C (98.1 °F)  Pulse  Av.4  Min: 66  Max: 99   Blood Pressure: 138/105       Physical Exam:  Physical Exam  Constitutional:       Appearance: Normal appearance.   HENT:      Head: Normocephalic and atraumatic.      Nose: Nose normal.      Mouth/Throat:      Mouth: Mucous membranes are moist.      Pharynx: No  oropharyngeal exudate.   Eyes:      Extraocular Movements: Extraocular movements intact.      Conjunctiva/sclera: Conjunctivae normal.      Pupils: Pupils are equal, round, and reactive to light.   Neck:      Musculoskeletal: Normal range of motion and neck supple.   Cardiovascular:      Rate and Rhythm: Normal rate and regular rhythm.      Heart sounds: No murmur.   Pulmonary:      Effort: Pulmonary effort is normal.      Breath sounds: Normal breath sounds.   Abdominal:      Palpations: Abdomen is soft.      Tenderness: There is no tenderness.   Musculoskeletal:      Comments: Left ankle surgical site.  Dressed.  No drainage through bandage    RUE PICC - nontender, no surrounding erythema   Skin:     General: Skin is warm and dry.   Neurological:      General: No focal deficit present.      Mental Status: She is alert and oriented to person, place, and time.   Psychiatric:         Mood and Affect: Mood normal.         Behavior: Behavior normal.      Comments: Very pleasant         Meds:    Current Facility-Administered Medications:   •  vancomycin  •  MD Alert...Vancomycin per Pharmacy  •  cefTRIAXone (ROCEPHIN) IV  •  enoxaparin (LOVENOX) injection  •  [START ON 12/9/2019] methotrexate  •  lisinopril  •  hydroCHLOROthiazide  •  acetaminophen  •  Notify provider if pain remains uncontrolled **AND** Use the numeric rating scale (NRS-11) on regular floors and Critical-Care Pain Observation Tool (CPOT) on ICUs/Trauma to assess pain **AND** Pulse Ox (Oximetry) **AND** Pharmacy Consult Request **AND** If patient difficult to arouse and/or has respiratory depression, stop any opiates that are currently infusing and call a Rapid Response. **AND** oxyCODONE immediate-release **AND** oxyCODONE immediate-release **AND** morphine injection  •  cloNIDine  •  ondansetron  •  ondansetron  •  promethazine  •  promethazine  •  prochlorperazine  •  senna-docusate **AND** polyethylene glycol/lytes **AND** magnesium hydroxide  **AND** bisacodyl    Labs:  Recent Labs     12/05/19  0419 12/06/19  0119   WBC 7.4 6.7   RBC 4.00* 3.68*   HEMOGLOBIN 10.1* 9.5*   HEMATOCRIT 32.8* 30.3*   MCV 82.0 82.3   MCH 25.3* 25.8*   RDW 50.1* 50.5*   PLATELETCT 255 238   MPV 8.9* 9.2     Recent Labs     12/05/19  1152   SODIUM 136   POTASSIUM 3.8   CHLORIDE 105   CO2 24   GLUCOSE 92   BUN 12     Recent Labs     12/05/19  1152   CREATININE 0.85       Imaging:  Dx-ankle 3+ Views Left    Addendum Date: 12/1/2019    Additional clinical history: Infection In light of the above clinical history there is some irregularity in the lateral malleolus cortex. Osteomyelitis is not excluded in this region. Further assessment could be performed with bone scan. Findings were discussed with DANIEL RUIZ II on 12/1/2019 10:04 PM.    Result Date: 12/1/2019 12/1/2019 8:13 PM HISTORY/REASON FOR EXAM:  Pain/Deformity Following Trauma; prior left ankle fracture. TECHNIQUE/EXAM DESCRIPTION AND NUMBER OF VIEWS:  3 views of the  LEFT ankle. COMPARISON: 4/8/2019 FINDINGS: MINERALIZATION: Mineralization is unremarkable for age. INJURY: There has been interval open reduction and internal fixation of the medial and lateral malleolar fractures. There is curvilinear deformity of the underlying bony structures. There is indistinctly visualized lucency extending through the posterior  malleolus. This is similar to the prior study and partially obscured by overlying hardware. There is no evidence of hardware loosening or failure. JOINTS: No erosive arthropathy is evident.  Ankle mortise is symmetric. Distal tibial fibular syndesmosis appears widened.     1.  Interval open reduction and internal fixation of lateral and medial malleoli fractures 2.  Indistinctly visualized posterior malleolus fracture, likely indicating some amount of incomplete bony fusion of the previously demonstrated posterior malleolar fracture site, though interval re-injury is not excluded 3.  Widened distal  tibiofibular syndesmosis, similar to prior    Nm-bone Scan(limited)/flow    Result Date: 12/2/2019 12/2/2019 1:08 PM HISTORY/REASON FOR EXAM:  Foot pain, initial exam; Left ankle cellulitis with concern for osteomyelitis TECHNIQUE/EXAM DESCRIPTION AND NUMBER OF VIEWS:  26.2 mCi Tc 99m-MDP was administered intravenously followed by three-phase imaging of the ankles and feet. COMPARISON:  X-ray 12/1/2019 FINDINGS:     The blood pool images demonstrate increased flow to the left ankle. The blood pool images demonstrate increased activity in the left ankle. The delayed images demonstrate increased activity in the left ankle the region of the distal tibia.     1.  There is increased activity on all 3 phases in the left ankle/distal tibial region . 2.  While this could represent osteomyelitis, the fact that there is fracture in this region based on x-rays, this could also simply represent cellulitis with bony remodeling causing the uptake on the delayed imaging.    Us-extremity Non Vascular Unilateral Left    Result Date: 12/1/2019 12/1/2019 9:29 PM HISTORY/REASON FOR EXAM:  Leukopenia, swelling over distal lateral LEFT leg at site of prior OriF TECHNIQUE/EXAM DESCRIPTION AND NUMBER OF VIEWS: The distal LEFT lateral leg was scanned with grayscale. Selected images were submitted for radiologist review. COMPARISON: None FINDINGS: There is diffuse edema in the soft tissues of the region of interest. No discrete fluid collection is seen.     Diffuse edema which could indicate cellulitis. No abscess visualized.      Micro:  Results     Procedure Component Value Units Date/Time    CULTURE WOUND W/ GRAM STAIN [[id number]] Collected:  12/04/19 0827    Order Status:  Completed Specimen:  Wound Updated:  12/07/19 0729     Significant Indicator NEG     Source WND     Site Left Ankle wound     Culture Result No growth at 72 hours.     Gram Stain Result Rare WBCs.  No organisms seen.      Narrative:       Surgery - swabs received     Anaerobic Culture [[id number]] Collected:  12/04/19 0827    Order Status:  Completed Specimen:  Wound Updated:  12/07/19 0729     Significant Indicator NEG     Source WND     Site Left Ankle wound     Culture Result Culture in progress.    Narrative:       Surgery - swabs received    GRAM STAIN [[id number]] Collected:  12/04/19 0827    Order Status:  Completed Specimen:  Wound Updated:  12/04/19 1427     Significant Indicator .     Source WND     Site Left Ankle wound     Gram Stain Result Rare WBCs.  No organisms seen.      Narrative:       Surgery - swabs received          Assessment:  Active Hospital Problems    Diagnosis   • *Cellulitis of left lower extremity [L03.116]   • Rheumatoid arthritis (HCC) [M06.9]   • Other forms of systemic lupus erythematosus (HCC) [M32.8]     Plan:  Left ankle hardware infection  Underlying osteomyelitis  S/p hardware removal and I&D down to bone on 12/4/19 by Dr. Pitts  OR cultures - NGTD  Continue vanco and ceftriaxone  Monitor renal function and vanco trough  Vanco trough 17.2 on 12/5  Plan for 6 weeks of IV abx for hardware infection and osteomyelitis from 12/4  Stop date 01/15/19  Can do daptomycin 8 mg/kg daily and ceftriaxone 2 g daiy as outpatient  R-[facility] orders done on 12/6  Give 1 dose of daptomycin 8 mg/kg on day of discharge    H/o left ankle fracture  S/p ORIF in April  Screw removal in August     Rheumatoid arthritis and SLE  On MTX and plaquenil     I have performed a physical exam and reviewed and updated ROS and plan today 12/7/2019.  In review of yesterday's note 12/6/2019, there are no changes except as documented above.    Dispo: [facility].  Okay to discharge patient once outpatient antibiotics have been arranged.    FU ID clinic 2 weeks post discharge    Plan of care discussed with Cyndy

## 2019-12-07 NOTE — PROGRESS NOTES
"Hospital Medicine Daily Progress Note    Date of Service: 12/7/2019   Hospital Day: 6 Pat. Class: Inpatient     Chief Complaint   Patient presents with   • Ankle Pain     reports L ankle fracture in 4/2019 - suden onset pain today with L lateral \"lump\", CMS+.    Interval Problem Update  Patient was seen and evaluated today for left ankle pain and infection.   Disposition: Remain IP until [facility] can be set up    Hospital Course    ER Course  Annalise Woodard is a 44 y.o. female who presents to the Emergency Department for left ankle pain onset this morning. She states she woke up with the pain and then when she showered she noticed a \"lump\" on her ankle. Pain worsening throughout day. She denies falling on her ankle or any other trauma.. The pain has been progressing throughout the day. No fever. She had ankle surgery (ORIF s/p triamalleolar fracture) on her left ankle in April and July by Dr. MOODY Padron.  She has lupus and arthritis rheumatoid, and she currently takes methotrexate.    Admission Day Course  44 y.o. female with history of prior ankle fracture status post surgical repair with hardware, systemic lupus erythematosus which is controlled, and essential hypertension on medical regimen, was in her usual state of health which includes regular pain in her ankle which occurs at the end of the day and is improved by awakening, noted something different on the day of admission.  She awoke with ankle pain, and a swelling at the surgical site, which seemed to increase throughout the day instead of improving.  She subsequently presented to the emergency department for further evaluation.  She has no other complaints, including chest pain, shortness of breath, abdominal pain, nausea vomiting, diarrhea or constipation.  No reported fever or chills.    12/4 - s/p I&D and hardware removal.Continue on ceftriaxone and vancomycin  12/5 - Continue IV antibiotics and follow post-op cx.   12/6 - Pain controlled. Post-op " cultures no growth to date. Stable for discharge once [facility] can be arranged.   12/7 - [facility] , however  not available until Monday. Will stay inpatient until [facility] appointments can be scheduled.       Code Status: Full Code  Consultants/Specialty:  Infectious Disease, Dr. Moss   Ortho Surgery, Dr. Pitts  Procedures During Hospitalization:  12/4- I&D and hardwire removal   Lines, Drains, Airways, Wounds  Lines:  Drains:  Airway:  Wounds: VTE prophylaxis: lovenox        Diet Order Regular    Assessment/Plan  * Cellulitis of left lower extremity- (present on admission)  Assessment & Plan  12/6 - 12/7 -  Stable. Continue IV antibiotics. Remain inpatient until [facility]IC arranged.   12/5 - Post-op cultures no growth. Will most likely need 6 weeks of IV antibiotics given hardware infection. PICC line today. [facility]IC referral placed.   12/4 - s/p I&D and hardware removal. Started on rocephin and vanc post-op  12/3 - ortho consulted. Bone scan ordered for osteomyelitis, indeterminate.  ID consulted.    Rheumatoid arthritis (HCC)- (present on admission)  Assessment & Plan  12/2-12/7 - stable.  12/1- adm -  On methotrexate/plaquenil regimen and PRN NSAID therapy.  Stable.       Essential hypertension- (present on admission)  Assessment & Plan  12/2- 12/7 - Stable.   12/1- adm - Controlled with current medication regimen which will be continued.  Monitor.      Other forms of systemic lupus erythematosus (HCC)- (present on admission)  Assessment & Plan  12/2-12/7 - Continue MTX inpatient. Monitor   12/1- adm -On methotrexate therapy.  Monitor.        Laboratory  Results from last 7 days   Lab Units 12/06/19  0119 12/05/19  0419   WBC 1501 K/uL 6.7 7.4   HGB 1503 g/dL 9.5* 10.1*   HCT 1504 % 30.3* 32.8*   PLATELET COUNT 1518 K/uL 238 255    Results from last 7 days   Lab Units 12/05/19  1152 12/04/19  0406   SODIUM 101 mmol/L 136 138   POTASSIUM 102 mmol/L 3.8 4.1   CHLORIDE 103 mmol/L 105 105    CO2 104 mmol/L 24 26   ANION GAP ANION  7.0 7.0    mg/dL 12 14   CREATININE 109 mg/dL 0.85 0.84   CALCIUM 105 mg/dL 8.5 8.6   GLUCOSE 112 mg/dL 92 96   IF YULI AMER [id number] mL/min/1.73 m 2 >60 >60   IF NON AFRICAN AMER [id number] mL/min/1.73 m 2 >60 >60             Intake/Output Summary (Last 24 hours) at 12/7/2019 1424  Last data filed at 12/7/2019 0815  Gross per 24 hour   Intake 1460 ml   Output --   Net 1460 ml    Vital Signs  Temp:  [36.7 °C (98.1 °F)-37.3 °C (99.1 °F)] 36.7 °C (98.1 °F)  Pulse:  [92-95] 94  Resp:  [16-17] 16  BP: (123-139)/() 138/105  SpO2:  [94 %-100 %] 94 %     Review of Systems  Review of Systems   Constitutional: Negative for chills, diaphoresis, fever and malaise/fatigue.   Cardiovascular: Negative for chest pain and palpitations.   Musculoskeletal: Positive for joint pain. Negative for back pain, falls and myalgias.   Neurological: Negative for dizziness, weakness and headaches.    Physical Exam  Physical Exam   Constitutional: She is oriented to person, place, and time. She appears well-developed and well-nourished.   Cardiovascular: Normal rate and regular rhythm.   Pulmonary/Chest: Effort normal and breath sounds normal.   Abdominal: Soft. Bowel sounds are normal.   Musculoskeletal:         General: Tenderness (Left ankle) present. No edema.      Comments: Dressing to left ankle c/d/i.     Neurological: She is alert and oriented to person, place, and time.   Skin: Skin is warm and dry.   Nursing note and vitals reviewed.       Medications  vancomycin, 14 mg/kg, Intravenous, Q12HR  MD Alert...Vancomycin per Pharmacy, , Other, PHARMACY TO DOSE  cefTRIAXone (ROCEPHIN) IV, 2 g, Intravenous, Q24HRS  enoxaparin (LOVENOX) injection, 40 mg, Subcutaneous, DAILY  [START ON 12/9/2019] methotrexate, 10 mg, Oral, Q MONDAY  lisinopril, 10 mg, Oral, Q DAY  hydroCHLOROthiazide, 25 mg, Oral, DAILY  Pharmacy Consult Request, 1 Each, Other, PHARMACY TO DOSE  senna-docusate, 2 Tab, Oral,  BID         Medications were reviewed today.     Imaging  IR-PICC LINE PLACEMENT W/ GUIDANCE > AGE 5   Final Result                  Ultrasound-guided PICC placement performed by qualified nursing staff as    above.          NM-BONE SCAN(LIMITED)/FLOW   Final Result      1.  There is increased activity on all 3 phases in the left ankle/distal tibial region .   2.  While this could represent osteomyelitis, the fact that there is fracture in this region based on x-rays, this could also simply represent cellulitis with bony remodeling causing the uptake on the delayed imaging.      US-EXTREMITY NON VASCULAR UNILATERAL LEFT   Final Result      Diffuse edema which could indicate cellulitis. No abscess visualized.      DX-ANKLE 3+ VIEWS LEFT   Final Result   Addendum 1 of 1   Additional clinical history: Infection   In light of the above clinical history there is some irregularity in the    lateral malleolus cortex. Osteomyelitis is not excluded in this region.    Further assessment could be performed with bone scan.      Findings were discussed with DANIEL RUIZ II on 12/1/2019 10:04 PM.      Final

## 2019-12-07 NOTE — PROGRESS NOTES
"Hospital Medicine Daily Progress Note    Date of Service: 12/6/2019   Hospital Day: 5 Pat. Class: Inpatient     Chief Complaint   Patient presents with   • Ankle Pain     reports L ankle fracture in 4/2019 - suden onset pain today with L lateral \"lump\", CMS+.    Interval Problem Update  Patient was seen and evaluated today for left ankle pain and infection.   Disposition: Remain IP until [facility] can be set up    Hospital Course    ER Course  Annalise Woodard is a 44 y.o. female who presents to the Emergency Department for left ankle pain onset this morning. She states she woke up with the pain and then when she showered she noticed a \"lump\" on her ankle. Pain worsening throughout day. She denies falling on her ankle or any other trauma.. The pain has been progressing throughout the day. No fever. She had ankle surgery (ORIF s/p triamalleolar fracture) on her left ankle in April and July by Dr. MOODY Padron.  She has lupus and arthritis rheumatoid, and she currently takes methotrexate.    Admission Day Course  44 y.o. female with history of prior ankle fracture status post surgical repair with hardware, systemic lupus erythematosus which is controlled, and essential hypertension on medical regimen, was in her usual state of health which includes regular pain in her ankle which occurs at the end of the day and is improved by awakening, noted something different on the day of admission.  She awoke with ankle pain, and a swelling at the surgical site, which seemed to increase throughout the day instead of improving.  She subsequently presented to the emergency department for further evaluation.  She has no other complaints, including chest pain, shortness of breath, abdominal pain, nausea vomiting, diarrhea or constipation.  No reported fever or chills.    12/6 - Pain controlled. Post-op cultures no growth to date. Stable for discharge once [facility] can be arranged.       Code Status: Full " Code  Consultants/Specialty:  Infectious Disease, Dr. Moss   Ortho Surgery, Dr. Pitts  Procedures During Hospitalization:  12/4- I&D and hardwire removal   Lines, Drains, Airways, Wounds  Lines:  Drains:  Airway:  Wounds: VTE prophylaxis: lovenox        Diet Order Regular    Assessment/Plan  * Cellulitis of left lower extremity- (present on admission)  Assessment & Plan  12/6 - Continue IV antibiotics. Waiting on ID to send antibiotic orders to [facility].   12/5 - Post-op cultures no growth. Will most likely need 6 weeks of IV antibiotics given hardware infection. PICC line today. [facility] referral placed.   12/4 - s/p I&D and hardware removal. Started on rocephin and vanc post-op  12/3 - ortho consulted. Bone scan ordered for osteomyelitis, indeterminate.  ID consulted.    Rheumatoid arthritis (HCC)- (present on admission)  Assessment & Plan  12/2-12/6 - stable.  12/1- adm -  On methotrexate/plaquenil regimen and PRN NSAID therapy.  Stable.       Essential hypertension- (present on admission)  Assessment & Plan  12/2- 12/6 - Stable.   12/1- adm - Controlled with current medication regimen which will be continued.  Monitor.      Other forms of systemic lupus erythematosus (HCC)- (present on admission)  Assessment & Plan  12/2-12/6 - Continue MTX inpatient. Monitor   12/1- adm -On methotrexate therapy.  Monitor.        Laboratory  Results from last 7 days   Lab Units 12/06/19  0119 12/05/19  0419   WBC 1501 K/uL 6.7 7.4   HGB 1503 g/dL 9.5* 10.1*   HCT 1504 % 30.3* 32.8*   PLATELET COUNT 1518 K/uL 238 255    Results from last 7 days   Lab Units 12/05/19  1152 12/04/19  0406   SODIUM 101 mmol/L 136 138   POTASSIUM 102 mmol/L 3.8 4.1   CHLORIDE 103 mmol/L 105 105   CO2 104 mmol/L 24 26   ANION GAP ANION  7.0 7.0    mg/dL 12 14   CREATININE 109 mg/dL 0.85 0.84   CALCIUM 105 mg/dL 8.5 8.6   GLUCOSE 112 mg/dL 92 96   IF YULI AMER [id number] mL/min/1.73 m 2 >60 >60   IF NON AFRICAN AMER [id number] mL/min/1.73 m 2 >60 >60              Intake/Output Summary (Last 24 hours) at 12/6/2019 1656  Last data filed at 12/6/2019 0840  Gross per 24 hour   Intake 1090 ml   Output --   Net 1090 ml    Vital Signs  Temp:  [36.9 °C (98.5 °F)-37.6 °C (99.7 °F)] 37.1 °C (98.8 °F)  Pulse:  [87-96] 96  Resp:  [15-17] 15  BP: (125-140)/() 140/107  SpO2:  [96 %-98 %] 97 %     Review of Systems  Review of Systems   Constitutional: Negative for chills, diaphoresis, fever and malaise/fatigue.   Cardiovascular: Negative for chest pain and palpitations.   Musculoskeletal: Positive for joint pain. Negative for back pain, falls and myalgias.   Neurological: Negative for dizziness, weakness and headaches.    Physical Exam  Physical Exam   Constitutional: She is oriented to person, place, and time. She appears well-developed and well-nourished.   Cardiovascular: Normal rate and regular rhythm.   Pulmonary/Chest: Effort normal and breath sounds normal.   Abdominal: Soft. Bowel sounds are normal.   Musculoskeletal:         General: Tenderness (Left ankle) present. No edema.      Comments: Dressing to left ankle c/d/i., covered with ace bandage     Neurological: She is alert and oriented to person, place, and time.   Skin: Skin is warm and dry.   Nursing note and vitals reviewed.       Medications  vancomycin, 14 mg/kg, Intravenous, Q12HR  MD Alert...Vancomycin per Pharmacy, , Other, PHARMACY TO DOSE  cefTRIAXone (ROCEPHIN) IV, 2 g, Intravenous, Q24HRS  enoxaparin (LOVENOX) injection, 40 mg, Subcutaneous, DAILY  [START ON 12/9/2019] methotrexate, 10 mg, Oral, Q MONDAY  lisinopril, 10 mg, Oral, Q DAY  hydroCHLOROthiazide, 25 mg, Oral, DAILY  Pharmacy Consult Request, 1 Each, Other, PHARMACY TO DOSE  senna-docusate, 2 Tab, Oral, BID         Medications were reviewed today.     Imaging  IR-PICC LINE PLACEMENT W/ GUIDANCE > AGE 5   Final Result                  Ultrasound-guided PICC placement performed by qualified nursing staff as    above.          NM-BONE  SCAN(LIMITED)/FLOW   Final Result      1.  There is increased activity on all 3 phases in the left ankle/distal tibial region .   2.  While this could represent osteomyelitis, the fact that there is fracture in this region based on x-rays, this could also simply represent cellulitis with bony remodeling causing the uptake on the delayed imaging.      US-EXTREMITY NON VASCULAR UNILATERAL LEFT   Final Result      Diffuse edema which could indicate cellulitis. No abscess visualized.      DX-ANKLE 3+ VIEWS LEFT   Final Result   Addendum 1 of 1   Additional clinical history: Infection   In light of the above clinical history there is some irregularity in the    lateral malleolus cortex. Osteomyelitis is not excluded in this region.    Further assessment could be performed with bone scan.      Findings were discussed with DANIEL RUIZ II on 12/1/2019 10:04 PM.      Final

## 2019-12-08 LAB — VANCOMYCIN TROUGH SERPL-MCNC: 14.6 UG/ML (ref 10–20)

## 2019-12-08 PROCEDURE — 700105 HCHG RX REV CODE 258: Performed by: HOSPITALIST

## 2019-12-08 PROCEDURE — 700111 HCHG RX REV CODE 636 W/ 250 OVERRIDE (IP): Performed by: HOSPITALIST

## 2019-12-08 PROCEDURE — 80202 ASSAY OF VANCOMYCIN: CPT

## 2019-12-08 PROCEDURE — 700105 HCHG RX REV CODE 258

## 2019-12-08 PROCEDURE — A9270 NON-COVERED ITEM OR SERVICE: HCPCS | Performed by: HOSPITALIST

## 2019-12-08 PROCEDURE — 700111 HCHG RX REV CODE 636 W/ 250 OVERRIDE (IP): Performed by: NURSE PRACTITIONER

## 2019-12-08 PROCEDURE — 700105 HCHG RX REV CODE 258: Performed by: NURSE PRACTITIONER

## 2019-12-08 PROCEDURE — 99232 SBSQ HOSP IP/OBS MODERATE 35: CPT | Performed by: INTERNAL MEDICINE

## 2019-12-08 PROCEDURE — 99232 SBSQ HOSP IP/OBS MODERATE 35: CPT | Performed by: STUDENT IN AN ORGANIZED HEALTH CARE EDUCATION/TRAINING PROGRAM

## 2019-12-08 PROCEDURE — 700102 HCHG RX REV CODE 250 W/ 637 OVERRIDE(OP): Performed by: HOSPITALIST

## 2019-12-08 PROCEDURE — 770006 HCHG ROOM/CARE - MED/SURG/GYN SEMI*

## 2019-12-08 RX ORDER — SODIUM CHLORIDE 9 MG/ML
INJECTION, SOLUTION INTRAVENOUS
Status: COMPLETED
Start: 2019-12-08 | End: 2019-12-09

## 2019-12-08 RX ADMIN — OXYCODONE HYDROCHLORIDE 5 MG: 5 TABLET ORAL at 22:48

## 2019-12-08 RX ADMIN — OXYCODONE HYDROCHLORIDE 5 MG: 5 TABLET ORAL at 14:52

## 2019-12-08 RX ADMIN — SODIUM CHLORIDE 500 ML: 9 INJECTION, SOLUTION INTRAVENOUS at 06:16

## 2019-12-08 RX ADMIN — CEFTRIAXONE SODIUM 2 G: 2 INJECTION, POWDER, FOR SOLUTION INTRAMUSCULAR; INTRAVENOUS at 06:16

## 2019-12-08 RX ADMIN — OXYCODONE HYDROCHLORIDE 5 MG: 5 TABLET ORAL at 08:40

## 2019-12-08 RX ADMIN — ENOXAPARIN SODIUM 40 MG: 100 INJECTION SUBCUTANEOUS at 06:09

## 2019-12-08 RX ADMIN — VANCOMYCIN HYDROCHLORIDE 1500 MG: 500 INJECTION, POWDER, LYOPHILIZED, FOR SOLUTION INTRAVENOUS at 14:53

## 2019-12-08 RX ADMIN — LISINOPRIL 10 MG: 10 TABLET ORAL at 06:08

## 2019-12-08 RX ADMIN — HYDROCHLOROTHIAZIDE 25 MG: 25 TABLET ORAL at 06:08

## 2019-12-08 RX ADMIN — VANCOMYCIN HYDROCHLORIDE 1500 MG: 500 INJECTION, POWDER, LYOPHILIZED, FOR SOLUTION INTRAVENOUS at 01:03

## 2019-12-08 ASSESSMENT — ENCOUNTER SYMPTOMS
MYALGIAS: 0
DIARRHEA: 0
SHORTNESS OF BREATH: 0
DIZZINESS: 0
WEAKNESS: 0
PALPITATIONS: 0
FEVER: 0
HEADACHES: 0
BACK PAIN: 0
FALLS: 0
VOMITING: 0
COUGH: 0
ABDOMINAL PAIN: 0
DIAPHORESIS: 0
CHILLS: 0
NAUSEA: 0

## 2019-12-08 NOTE — PROGRESS NOTES
"   Orthopaedic Progress Note    Interval changes:  Patient doing well   ID team following for recs  Dressing CDI    ROS - Patient denies any new issues.  Pain well controlled.    /78   Pulse 98   Temp 36.7 °C (98.1 °F) (Temporal)   Resp 17   Ht 1.715 m (5' 7.5\")   Wt 110.1 kg (242 lb 11.6 oz)   SpO2 98%       Patient seen and examined  No acute distress  Breathing non labored  RRR  LLE dressing CDI, DNVI, moves all toes, cap refill <2 sec.      Recent Labs     12/05/19  0419 12/06/19  0119   WBC 7.4 6.7   RBC 4.00* 3.68*   HEMOGLOBIN 10.1* 9.5*   HEMATOCRIT 32.8* 30.3*   MCV 82.0 82.3   MCH 25.3* 25.8*   MCHC 30.8* 31.4*   RDW 50.1* 50.5*   PLATELETCT 255 238   MPV 8.9* 9.2       Active Hospital Problems    Diagnosis   • Cellulitis of left lower extremity [L03.116]     Priority: High   • Rheumatoid arthritis (HCC) [M06.9]     Priority: Low   • Other forms of systemic lupus erythematosus (HCC) [M32.8]     Priority: Low   • Essential hypertension [I10]     Priority: Low       Assessment/Plan:  Patient doing well  POD#3 S/P:  Hardware removal, left ankle and irrigation and debridement, skin, subcutaneous tissue, muscle, and bone.  Wt bearing status - WBAT  Wound care/Drains - dressings changed every other day by nursing  Future Procedures - none planned   Lovenox: Start 12/4, Duration-until ambulatory > 150'  Sutures/Staples out- 10-14 days post operatively  PT/OT-initiated  Antibiotics: rocephin 2g IV QD, vanco 1500 mg IV Q12  DVT Prophylaxis- TEDS/SCDs/Foot pumps  Hooker-none  Case Coordination for Discharge Planning - Disposition pending abx needs   "

## 2019-12-08 NOTE — PROGRESS NOTES
"   Orthopaedic Progress Note    Interval changes:  Patient doing well   ID team following for recs  Dressing changed incision without issue     ROS - Patient denies any new issues.  Pain well controlled.    /101   Pulse 94   Temp 36.7 °C (98.1 °F) (Temporal)   Resp 16   Ht 1.715 m (5' 7.5\")   Wt 110.1 kg (242 lb 11.6 oz)   SpO2 97%       Patient seen and examined  No acute distress  Breathing non labored  RRR  LLE dressing changed incision without issue, DNVI, moves all toes, cap refill <2 sec.      Recent Labs     12/06/19  0119   WBC 6.7   RBC 3.68*   HEMOGLOBIN 9.5*   HEMATOCRIT 30.3*   MCV 82.3   MCH 25.8*   MCHC 31.4*   RDW 50.5*   PLATELETCT 238   MPV 9.2       Active Hospital Problems    Diagnosis   • Cellulitis of left lower extremity [L03.116]     Priority: High   • Rheumatoid arthritis (HCC) [M06.9]     Priority: Low   • Other forms of systemic lupus erythematosus (HCC) [M32.8]     Priority: Low   • Essential hypertension [I10]     Priority: Low       Assessment/Plan:  Patient doing well  POD#4 S/P:  Hardware removal, left ankle and irrigation and debridement, skin, subcutaneous tissue, muscle, and bone.  Wt bearing status - WBAT  Wound care/Drains - dressings changed every other day by nursing  Future Procedures - none planned   Lovenox: Start 12/4, Duration-until ambulatory > 150'  Sutures/Staples out- 10-14 days post operatively  PT/OT-initiated  Antibiotics: rocephin 2g IV QD, vanco 1500 mg IV Q12  DVT Prophylaxis- TEDS/SCDs/Foot pumps  Hooker-none  Case Coordination for Discharge Planning - Disposition pending abx needs   "

## 2019-12-08 NOTE — PROGRESS NOTES
"Hospital Medicine Daily Progress Note    Date of Service: 12/8/2019   Hospital Day: 7 Pat. Class: Inpatient     Chief Complaint   Patient presents with   • Ankle Pain     reports L ankle fracture in 4/2019 - suden onset pain today with L lateral \"lump\", CMS+.    Interval Problem Update  Patient was seen and evaluated today for left ankle pain and infection.   Disposition: Remain IP until [facility] can be set up    Hospital Course    ER Course  Annalise Woodard is a 44 y.o. female who presents to the Emergency Department for left ankle pain onset this morning. She states she woke up with the pain and then when she showered she noticed a \"lump\" on her ankle. Pain worsening throughout day. She denies falling on her ankle or any other trauma.. The pain has been progressing throughout the day. No fever. She had ankle surgery (ORIF s/p triamalleolar fracture) on her left ankle in April and July by Dr. MOODY Padron.  She has lupus and arthritis rheumatoid, and she currently takes methotrexate.    Admission Day Course  44 y.o. female with history of prior ankle fracture status post surgical repair with hardware, systemic lupus erythematosus which is controlled, and essential hypertension on medical regimen, was in her usual state of health which includes regular pain in her ankle which occurs at the end of the day and is improved by awakening, noted something different on the day of admission.  She awoke with ankle pain, and a swelling at the surgical site, which seemed to increase throughout the day instead of improving.  She subsequently presented to the emergency department for further evaluation.  She has no other complaints, including chest pain, shortness of breath, abdominal pain, nausea vomiting, diarrhea or constipation.  No reported fever or chills.    12/2 - pain is reduced today but swelling is unchanged. She is somewhat concerned about the upcoming bone scan. This was explained in detail to the patient. " Afebrile  12/3 -  ankle pain and swelling decreased from yesterday. Full ankle ROM. Denies N/V, diarrhea, chills or diaphoresis.  Plan for I&D tomorrow by Ortho   12/4 - s/p I&D and hardware removal.Continue on ceftriaxone and vancomycin  12/5 - Continue IV antibiotics and follow post-op cx.   12/6 - Pain controlled. Post-op cultures no growth to date. Stable for discharge once [facility]IC can be arranged.   12/7 - [facility] , however  not available until Monday. Will stay inpatient until [facility] appointments can be scheduled.   12/8 - No pain. Wound cultures No growth. Continue inpatient for IV antibitoics until OPIC appointments scheduled .      Code Status: Full Code  Consultants/Specialty:  Infectious Disease, Dr. Moss   Ortho Surgery, Dr. Pitts  Procedures During Hospitalization:  12/4- I&D and hardwire removal   Lines, Drains, Airways, Wounds  Lines:  Drains:  Airway:  Wounds: VTE prophylaxis: lovenox        Diet Order Regular    Assessment/Plan  * Cellulitis of left lower extremity- (present on admission)  Assessment & Plan  12/6 - 12/8 -  Stable. Continue IV antibiotics. Remain inpatient until OPIC arranged.   12/5 - Post-op cultures no growth. Will most likely need 6 weeks of IV antibiotics given hardware infection. PICC line today. OPIC referral placed.   12/4 - s/p I&D and hardware removal. Started on rocephin and vanc post-op  12/3 - ortho consulted. Bone scan ordered for osteomyelitis, indeterminate.  ID consulted.    Rheumatoid arthritis (HCC)- (present on admission)  Assessment & Plan  12/2-12/8 - stable.  12/1- adm -  On methotrexate/plaquenil regimen and PRN NSAID therapy.  Stable.       Essential hypertension- (present on admission)  Assessment & Plan  12/2- 12/8- Stable.   12/1- adm - Controlled with current medication regimen which will be continued.  Monitor.      Other forms of systemic lupus erythematosus (HCC)- (present on admission)  Assessment & Plan  12/2-12/8  - Continue MTX inpatient. Monitor   12/1- adm -On methotrexate therapy.  Monitor.        Laboratory  Results from last 7 days   Lab Units 12/06/19  0119 12/05/19  0419   WBC 1501 K/uL 6.7 7.4   HGB 1503 g/dL 9.5* 10.1*   HCT 1504 % 30.3* 32.8*   PLATELET COUNT 1518 K/uL 238 255    Results from last 7 days   Lab Units 12/05/19  1152 12/04/19  0406   SODIUM 101 mmol/L 136 138   POTASSIUM 102 mmol/L 3.8 4.1   CHLORIDE 103 mmol/L 105 105   CO2 104 mmol/L 24 26   ANION GAP ANION  7.0 7.0    mg/dL 12 14   CREATININE 109 mg/dL 0.85 0.84   CALCIUM 105 mg/dL 8.5 8.6   GLUCOSE 112 mg/dL 92 96   IF YULI AMER [id number] mL/min/1.73 m 2 >60 >60   IF NON AFRICAN AMER [id number] mL/min/1.73 m 2 >60 >60             Intake/Output Summary (Last 24 hours) at 12/8/2019 0934  Last data filed at 12/8/2019 0646  Gross per 24 hour   Intake 1310 ml   Output --   Net 1310 ml    Vital Signs  Temp:  [36.7 °C (98.1 °F)-37.6 °C (99.6 °F)] 36.7 °C (98.1 °F)  Pulse:  [94-98] 94  Resp:  [16-17] 16  BP: (114-131)/() 130/101  SpO2:  [96 %-98 %] 97 %     Review of Systems  Review of Systems   Constitutional: Negative for chills, diaphoresis, fever and malaise/fatigue.   Cardiovascular: Negative for chest pain and palpitations.   Musculoskeletal: Negative for back pain, falls, joint pain and myalgias.   Neurological: Negative for dizziness, weakness and headaches.    Physical Exam  Physical Exam   Constitutional: She is oriented to person, place, and time. She appears well-developed and well-nourished.   Cardiovascular: Normal rate and regular rhythm.   Pulmonary/Chest: Effort normal and breath sounds normal.   Abdominal: Soft. Bowel sounds are normal.   Musculoskeletal:         General: No tenderness or edema.      Comments: Dressing to left ankle c/d/i.     Neurological: She is alert and oriented to person, place, and time.   Skin: Skin is warm and dry.   Nursing note and vitals reviewed.       Medications  vancomycin, 14 mg/kg, Intravenous,  Q12HR  MD Alert...Vancomycin per Pharmacy, , Other, PHARMACY TO DOSE  cefTRIAXone (ROCEPHIN) IV, 2 g, Intravenous, Q24HRS  enoxaparin (LOVENOX) injection, 40 mg, Subcutaneous, DAILY  [START ON 12/9/2019] methotrexate, 10 mg, Oral, Q MONDAY  lisinopril, 10 mg, Oral, Q DAY  hydroCHLOROthiazide, 25 mg, Oral, DAILY  Pharmacy Consult Request, 1 Each, Other, PHARMACY TO DOSE  senna-docusate, 2 Tab, Oral, BID         Medications were reviewed today.     Imaging  IR-PICC LINE PLACEMENT W/ GUIDANCE > AGE 5   Final Result                  Ultrasound-guided PICC placement performed by qualified nursing staff as    above.          NM-BONE SCAN(LIMITED)/FLOW   Final Result      1.  There is increased activity on all 3 phases in the left ankle/distal tibial region .   2.  While this could represent osteomyelitis, the fact that there is fracture in this region based on x-rays, this could also simply represent cellulitis with bony remodeling causing the uptake on the delayed imaging.      US-EXTREMITY NON VASCULAR UNILATERAL LEFT   Final Result      Diffuse edema which could indicate cellulitis. No abscess visualized.      DX-ANKLE 3+ VIEWS LEFT   Final Result   Addendum 1 of 1   Additional clinical history: Infection   In light of the above clinical history there is some irregularity in the    lateral malleolus cortex. Osteomyelitis is not excluded in this region.    Further assessment could be performed with bone scan.      Findings were discussed with DANIEL RUIZ II on 12/1/2019 10:04 PM.      Final

## 2019-12-08 NOTE — PROGRESS NOTES
Bedside report received.  Assessment complete.  A&O x 4. Patient calls appropriately.  Patient up with no assist.    Patient has 7/10 pain. Medicated per MAR.  Denies N&V. Tolerating regular diet.  Surgical incision is dressed, CDI.  + void, + flatus, + BM.  Patient denies SOB.  Patient pleasant with staff and resting in bed.  Review plan with of care with patient. Call light and personal belongings with in reach. Hourly rounding in place. All needs met at this time.   Problem: Self Care Deficits Care Plan (Adult)  Goal: *Acute Goals and Plan of Care (Insert Text)  Description  FUNCTIONAL STATUS PRIOR TO ADMISSION: Patient was modified independent using a Single point cane for functional mobility. HOME SUPPORT: The patient lived with partner and needed assist due to her pain. Occupational Therapy Goals  Re eval, due to second part of back surgery and goals remain the same  Initiated 10/1/2019    1. Patient will perform lower body dressing with minimal assistance/contact guard assist using Reacher PRN within 7 days. 2.  Patient will perform Bathing with minimal assistance/contact guard assist using most appropriate DME within 7 days. 3.  Patient will toileting at minimal assistance/contact guard assist within 7 days. 4.  Patient will don/doff back brace at minimal assistance/contact guard assist within 7 days. 5.  Patient will verbalize/demonstrate 3/3 back precautions during ADL tasks without cues within 7 days. Outcome: Progressing Towards Goal   OCCUPATIONAL THERAPY RE-EVALUATION  Patient: Mert Damico (74 y.o. female)  Date: 10/2/2019  Diagnosis: Spinal stenosis of lumbar region with neurogenic claudication [M48.062] S/P lumbar spinal fusion  Procedure(s) (LRB):  L2-4 POSTERIOR DECOMPRESSION AND FUSION (N/A) 1 Day Post-Op  Precautions: Back  Chart, occupational therapy assessment, plan of care, and goals were reviewed. ASSESSMENT  Based on the objective data described below, pt was alert, and BP was stable and pt was on RA. She was educated on back precautions and remembered 3/3. She was educated in log rolling and mod of 2 for bed mobility . Pt was able to scoot with SBA, and mona back brace with min to mod assist and verbal cues. She was min of 2 to stand and with RW able to walk in room and to bathroom and transfer to toilet with min of 1. Pt did not have any leg buckling and stated that her left leg is weaker.       Current Level of Function Impacting Discharge (ADLs): decreased endurance, strength, functional mobility, and ADLs. Other factors to consider for discharge: pt will need hip kit, and need 24/7 at home at discharge         PLAN :  Recommendations and Planned Interventions: self care training, functional mobility training, therapeutic exercise, balance training, endurance activities, patient education, home safety training and family training/education    Frequency/Duration: Patient will be followed by occupational therapy 4 times a week to address goals. Recommend with staff: Guillermo Sorto for meals    Recommend next OT session: work on use of AE, for LB dressing    Recommendation for discharge: (in order for the patient to meet his/her long term goals)  Occupational therapy at least 2 days/week in the home AND ensure assist and/or supervision for safety with ADLs     This discharge recommendation:  Has been made in collaboration with the attending provider and/or case management    Equipment recommendations for successful discharge (if) home: none       SUBJECTIVE:   Patient stated I feel pretty good today.     OBJECTIVE DATA SUMMARY:   Hospital course since last seen and reason for reevaluation: pt had the second part of back surgery 10/1/2019 and was to be a re eval today    Cognitive/Behavioral Status:  Neurologic State: Alert  Orientation Level: Appropriate for age  Cognition: Appropriate decision making  Perception: Appears intact  Perseveration: No perseveration noted  Safety/Judgement: Awareness of environment    Functional Mobility and Transfers for ADLs:  Bed Mobility:  Rolling: Moderate assistance;Minimum assistance;Assist x1;Additional time  Supine to Sit: Minimum assistance; Moderate assistance;Assist x1;Additional time  Scooting: Contact guard assistance; Additional time    Transfers:  Sit to Stand: Minimum assistance;Contact guard assistance;Assist x2; Additional time     Bed to Chair: Contact guard assistance;Minimum assistance;Assist x2    Balance:  Sitting: Impaired  Sitting - Static: Good (unsupported)  Sitting - Dynamic: Good (unsupported)  Standing: Impaired  Standing - Static: Fair;Constant support  Standing - Dynamic : Fair;Constant support    ADL Assessment:  Feeding: Independent    Oral Facial Hygiene/Grooming: Independent    Bathing: Maximum assistance    Upper Body Dressing: Moderate assistance;Minimum assistance    Lower Body Dressing: Maximum assistance;Minimum assistance    Toileting: Moderate assistance;Minimum assistance              Cognitive Retraining  Safety/Judgement: Awareness of environment        Activity Tolerance:   Fair  Please refer to the flowsheet for vital signs taken during this treatment.     After treatment patient left in no apparent distress:   Sitting in chair, Bed / chair alarm activated and Caregiver / family present    COMMUNICATION/COLLABORATION:   The patients plan of care was discussed with: Physical Therapist, Registered Nurse,  and family     JANE Fajardo  Time Calculation: 40 mins

## 2019-12-08 NOTE — CARE PLAN
Problem: Communication  Goal: The ability to communicate needs accurately and effectively will improve  Outcome: PROGRESSING AS EXPECTED     Problem: Safety  Goal: Will remain free from injury  Outcome: PROGRESSING AS EXPECTED  Note:   Bed locked and in lowest position.  Call light and personal belongings are within reach.       Problem: Pain Management  Goal: Pain level will decrease to patient's comfort goal  Outcome: PROGRESSING AS EXPECTED  Note:   Will encourage pt to ambulate, Will medicate per MAR, and will provide non-pharmacologic pain relief measures.       Problem: Mobility  Goal: Risk for activity intolerance will decrease  Outcome: PROGRESSING AS EXPECTED  Note:   Encourage ambulation as tolerated.

## 2019-12-08 NOTE — CARE PLAN
Problem: Infection  Goal: Will remain free from infection  Outcome: PROGRESSING AS EXPECTED  Note:   IVABX being administered on time as ordered per MAR. Pt tolerating well.      Problem: Venous Thromboembolism (VTW)/Deep Vein Thrombosis (DVT) Prevention:  Goal: Patient will participate in Venous Thrombosis (VTE)/Deep Vein Thrombosis (DVT)Prevention Measures  Outcome: PROGRESSING AS EXPECTED  Flowsheets (Taken 12/7/2019 2000)  Risk Assessment Score: 2  VTE RISK: Moderate  Mechanical Prophylaxis : SCDs, Sequential Compression Device  SCDs, Sequential Compression Device: On (RLE)  Pharmacologic Prophylaxis Used: LMWH: Enoxaparin(Lovenox)     Problem: Bowel/Gastric:  Goal: Normal bowel function is maintained or improved  Outcome: PROGRESSING AS EXPECTED  Flowsheets (Taken 12/7/2019 2000)  Last BM: 12/07/19  Number of Times Stooled: 1     Problem: Mobility  Goal: Risk for activity intolerance will decrease  Note:   LLE WBAT

## 2019-12-08 NOTE — PROGRESS NOTES
Infectious Disease Progress Note    Author: Marleny Moss M.D. Date & Time of service: 2019  10:41 AM    Chief Complaint:  FU for left ankle Hardware infection    Interval History:  44 y.o. woman with a history of rheumatoid arthritis and SLE on Plaquenil and methotrexate, and a history of a left ankle fracture in April status post ORIF followed by one screw removal in 2019 admitted for left ankle swelling, erythema and worsening pain.  Bone scan was indeterminant. S/p HW removal on  AF WBC 5.1 tired. S/p HWR this morning. Post op pain controlled.     AF WBC 7.4 more left ankle pain today after PT. Cx - NGTD. PICC placed  Pt states her injury is a workmans comp issue   T-max 99.7 WBC 6.7.patient's ankle pain is stable.  Workmen's Comp. will pay for outpatient infusions as the patient is not homebound.   afebrile.  Patient sitting up in the chair.  She is ambulating the halls and does have some left ankle pain and stiffness.  She is awaiting for outpatient infusion appointments to be set up   afebrile.  No CBC done today.  Patient is bored and awaiting open appointments to be arranged.  No new symptoms to report     Labs Reviewed.    Review of Systems:  Review of Systems   Constitutional: Negative for chills and fever.   Respiratory: Negative for cough and shortness of breath.    Gastrointestinal: Negative for abdominal pain, diarrhea, nausea and vomiting.   Genitourinary: Negative for dysuria.   Musculoskeletal: Positive for joint pain.        Left ankle stiffness   Neurological: Negative for dizziness and headaches.   All other systems reviewed and are negative.      Hemodynamics:  Temp (24hrs), Av °C (98.6 °F), Min:36.7 °C (98.1 °F), Max:37.6 °C (99.6 °F)  Temperature: 36.7 °C (98.1 °F)  Pulse  Av.3  Min: 66  Max: 99   Blood Pressure: 130/101       Physical Exam:  Physical Exam  Constitutional:       Appearance: Normal appearance.   HENT:      Head:  Normocephalic and atraumatic.      Nose: Nose normal.      Mouth/Throat:      Mouth: Mucous membranes are moist.      Pharynx: No oropharyngeal exudate.   Eyes:      Extraocular Movements: Extraocular movements intact.      Conjunctiva/sclera: Conjunctivae normal.      Pupils: Pupils are equal, round, and reactive to light.   Neck:      Musculoskeletal: Normal range of motion and neck supple.   Cardiovascular:      Rate and Rhythm: Normal rate and regular rhythm.      Heart sounds: No murmur.   Pulmonary:      Effort: Pulmonary effort is normal.      Breath sounds: Normal breath sounds.   Abdominal:      Palpations: Abdomen is soft.      Tenderness: There is no tenderness.   Musculoskeletal:      Comments: Left ankle surgical site.  Dressed.  No drainage through bandage    RUE PICC - nontender, no surrounding erythema   Skin:     General: Skin is warm and dry.   Neurological:      General: No focal deficit present.      Mental Status: She is alert and oriented to person, place, and time.   Psychiatric:         Mood and Affect: Mood normal.         Behavior: Behavior normal.      Comments: Very pleasant         Meds:    Current Facility-Administered Medications:   •  vancomycin  •  MD Alert...Vancomycin per Pharmacy  •  cefTRIAXone (ROCEPHIN) IV  •  enoxaparin (LOVENOX) injection  •  [START ON 12/9/2019] methotrexate  •  lisinopril  •  hydroCHLOROthiazide  •  acetaminophen  •  Notify provider if pain remains uncontrolled **AND** Use the numeric rating scale (NRS-11) on regular floors and Critical-Care Pain Observation Tool (CPOT) on ICUs/Trauma to assess pain **AND** Pulse Ox (Oximetry) **AND** Pharmacy Consult Request **AND** If patient difficult to arouse and/or has respiratory depression, stop any opiates that are currently infusing and call a Rapid Response. **AND** oxyCODONE immediate-release **AND** oxyCODONE immediate-release **AND** morphine injection  •  cloNIDine  •  ondansetron  •  ondansetron  •   promethazine  •  promethazine  •  prochlorperazine  •  senna-docusate **AND** polyethylene glycol/lytes **AND** magnesium hydroxide **AND** bisacodyl    Labs:  Recent Labs     12/06/19  0119   WBC 6.7   RBC 3.68*   HEMOGLOBIN 9.5*   HEMATOCRIT 30.3*   MCV 82.3   MCH 25.8*   RDW 50.5*   PLATELETCT 238   MPV 9.2     Recent Labs     12/05/19  1152   SODIUM 136   POTASSIUM 3.8   CHLORIDE 105   CO2 24   GLUCOSE 92   BUN 12     Recent Labs     12/05/19  1152   CREATININE 0.85       Imaging:  Dx-ankle 3+ Views Left    Addendum Date: 12/1/2019    Additional clinical history: Infection In light of the above clinical history there is some irregularity in the lateral malleolus cortex. Osteomyelitis is not excluded in this region. Further assessment could be performed with bone scan. Findings were discussed with DANIEL RUIZ II on 12/1/2019 10:04 PM.    Result Date: 12/1/2019 12/1/2019 8:13 PM HISTORY/REASON FOR EXAM:  Pain/Deformity Following Trauma; prior left ankle fracture. TECHNIQUE/EXAM DESCRIPTION AND NUMBER OF VIEWS:  3 views of the  LEFT ankle. COMPARISON: 4/8/2019 FINDINGS: MINERALIZATION: Mineralization is unremarkable for age. INJURY: There has been interval open reduction and internal fixation of the medial and lateral malleolar fractures. There is curvilinear deformity of the underlying bony structures. There is indistinctly visualized lucency extending through the posterior  malleolus. This is similar to the prior study and partially obscured by overlying hardware. There is no evidence of hardware loosening or failure. JOINTS: No erosive arthropathy is evident.  Ankle mortise is symmetric. Distal tibial fibular syndesmosis appears widened.     1.  Interval open reduction and internal fixation of lateral and medial malleoli fractures 2.  Indistinctly visualized posterior malleolus fracture, likely indicating some amount of incomplete bony fusion of the previously demonstrated posterior malleolar fracture  site, though interval re-injury is not excluded 3.  Widened distal tibiofibular syndesmosis, similar to prior    Nm-bone Scan(limited)/flow    Result Date: 12/2/2019 12/2/2019 1:08 PM HISTORY/REASON FOR EXAM:  Foot pain, initial exam; Left ankle cellulitis with concern for osteomyelitis TECHNIQUE/EXAM DESCRIPTION AND NUMBER OF VIEWS:  26.2 mCi Tc 99m-MDP was administered intravenously followed by three-phase imaging of the ankles and feet. COMPARISON:  X-ray 12/1/2019 FINDINGS:     The blood pool images demonstrate increased flow to the left ankle. The blood pool images demonstrate increased activity in the left ankle. The delayed images demonstrate increased activity in the left ankle the region of the distal tibia.     1.  There is increased activity on all 3 phases in the left ankle/distal tibial region . 2.  While this could represent osteomyelitis, the fact that there is fracture in this region based on x-rays, this could also simply represent cellulitis with bony remodeling causing the uptake on the delayed imaging.    Us-extremity Non Vascular Unilateral Left    Result Date: 12/1/2019 12/1/2019 9:29 PM HISTORY/REASON FOR EXAM:  Leukopenia, swelling over distal lateral LEFT leg at site of prior OriF TECHNIQUE/EXAM DESCRIPTION AND NUMBER OF VIEWS: The distal LEFT lateral leg was scanned with grayscale. Selected images were submitted for radiologist review. COMPARISON: None FINDINGS: There is diffuse edema in the soft tissues of the region of interest. No discrete fluid collection is seen.     Diffuse edema which could indicate cellulitis. No abscess visualized.      Micro:  Results     Procedure Component Value Units Date/Time    CULTURE WOUND W/ GRAM STAIN [[id number]] Collected:  12/04/19 0827    Order Status:  Completed Specimen:  Wound Updated:  12/07/19 0729     Significant Indicator NEG     Source WND     Site Left Ankle wound     Culture Result No growth at 72 hours.     Gram Stain Result Rare WBCs.  No  organisms seen.      Narrative:       Surgery - swabs received    Anaerobic Culture [[id number]] Collected:  12/04/19 0827    Order Status:  Completed Specimen:  Wound Updated:  12/07/19 0729     Significant Indicator NEG     Source WND     Site Left Ankle wound     Culture Result Culture in progress.    Narrative:       Surgery - swabs received    GRAM STAIN [[id number]] Collected:  12/04/19 0827    Order Status:  Completed Specimen:  Wound Updated:  12/04/19 1427     Significant Indicator .     Source WND     Site Left Ankle wound     Gram Stain Result Rare WBCs.  No organisms seen.      Narrative:       Surgery - swabs received          Assessment:  Active Hospital Problems    Diagnosis   • *Cellulitis of left lower extremity [L03.116]   • Rheumatoid arthritis (HCC) [M06.9]   • Other forms of systemic lupus erythematosus (HCC) [M32.8]     Plan:  Left ankle hardware infection  Underlying osteomyelitis  S/p hardware removal and I&D down to bone on 12/4/19 by Dr. Pitts  OR cultures - NGTD  Continue vanco and ceftriaxone  Monitor renal function and vanco trough  Vanco trough 14.6 today  Plan for 6 weeks of IV abx for hardware infection and osteomyelitis from 12/4  Stop date 01/15/19  Can do daptomycin 8 mg/kg daily and ceftriaxone 2 g daiy as outpatient  [facility] orders done on 12/6  Give 1 dose of daptomycin 8 mg/kg on day of discharge    H/o left ankle fracture  S/p ORIF in April  Screw removal in August     Rheumatoid arthritis and SLE  On MTX and plaquenil     I have performed a physical exam and reviewed and updated ROS and plan today 12/8/2019.  In review of yesterday's note 12/7/2019, there are no changes except as documented above.    Dispo: [facility].  Okay to discharge patient once outpatient antibiotics have been arranged.    FU ID clinic 2 weeks post discharge    Plan of care discussed with Cyndy,

## 2019-12-08 NOTE — PROGRESS NOTES
"Pharmacy Kinetics 44 y.o. female on vancomycin day # 5 2019    Currently on Vancomycin 1500 mg iv q12hr (, 13)  Duration of therapy: 6 weeks (end 1/15/20)     Indication for Treatment: Osteomyelitis     Pertinent history per medical record: Admitted on 2019 for ankle pain. Patient has a history of ORIF on left ankle fracture on 2019 followed by screw removal in 2019. Patient with acute swelling, pain and erythema. Patient went to the OR on 19 with left hardware removal, I&D of skin, subcutaneous tissue, muscle and bone. ID has been consulted and will further recommend duration of therapy and abx selection based of off hospital course and cultures.      Other antibiotics: ceftriaxone 2 g IV q24h     Allergies: Patient has no known allergies.      List concerns for renal function: obesity (BMI 37), Toradol      Pertinent cultures to date:   Current cultures NGTD    Recent Labs     19  0119   WBC 6.7     No results for input(s): BUN, CREATININE, ALBUMIN in the last 72 hours.  Recent Labs     19  0100   VANCOTROUGH 14.6       Intake/Output Summary (Last 24 hours) at 2019 1446  Last data filed at 2019 0646  Gross per 24 hour   Intake 950 ml   Output --   Net 950 ml      /101   Pulse 94   Temp 36.7 °C (98.1 °F) (Temporal)   Resp 16   Ht 1.715 m (5' 7.5\")   Wt 110.1 kg (242 lb 11.6 oz)   SpO2 97%  Temp (24hrs), Av °C (98.6 °F), Min:36.7 °C (98.1 °F), Max:37.6 °C (99.6 °F)      A/P   1. Vancomycin dose change: none  2. Next vancomycin level: ~3 days   3. Goal trough: 12-16 mcg/mL  4. Comments: Trough resulted therapeutic.  Plan for [facility] infusions; PICC line in place.  Afebrile overnight.  Will monitor for discharge to [facility] next week with one time daptomycin dose given prior to discharge.    Alan Noriega, AlexisD, BCPS        "

## 2019-12-08 NOTE — PROGRESS NOTES
"Pt A&O x4. Boyfriend at bedside.     Vitals: /97   Pulse 94   Temp 37.6 °C (99.6 °F) (Temporal)   Resp 17   Ht 1.715 m (5' 7.5\")   Wt 110.1 kg (242 lb 11.6 oz)   LMP 11/17/2019 (Approximate)   SpO2 97%   Breastfeeding? No   BMI 37.46 kg/m²     Pt rates pain 5 out of 10, medicated for pain. LLE ankle elevated on pillows. Declined ice pack this evening.     Neuro: HAMILTON. No complaints of numbness/ tingling.     Cardiac: Denies new onset of chest pain.    Vascular: Pulses 2+ BUE, 2+ RLE, 1+ LLE. 2+ edema noted to LLE ankle.    Respiratory: Lungs sound diminished in bases. On RA. Denies SOB.    GI: Abdomen soft, obese. Normoactive bowel sounds, + flatus, + BM today. On regular diet, tolerating well. - nausea/ vomiting.    : Pt voiding adequately.     MSK: Pt up to bathroom self, no longer requiring FWW, shuffle gait, tolerating well. Pt ambulated halls today. LLE WBAT.     Integumentary: LLE lateral ankle surgical site dressing CDI, observed, no drainage noted, gauze/ transparent.      Labs noted. Single lumen RUE PICC line in place, dressing CDI, line patent, no s/ sx of infection noted at this time.     Fall precautions in place: Bed locked in lowest position, Upper bed rails up, treaded socks in place, personal belongings within reach, call light within reach, appropriate mobility signs in place, - bed alarm. Pt calls appropriately.     Pt updated on POC.   "

## 2019-12-09 VITALS
DIASTOLIC BLOOD PRESSURE: 86 MMHG | TEMPERATURE: 98.6 F | SYSTOLIC BLOOD PRESSURE: 124 MMHG | RESPIRATION RATE: 18 BRPM | HEART RATE: 78 BPM | WEIGHT: 242.73 LBS | OXYGEN SATURATION: 99 % | BODY MASS INDEX: 36.79 KG/M2 | HEIGHT: 68 IN

## 2019-12-09 LAB
BACTERIA SPEC ANAEROBE CULT: NORMAL
SIGNIFICANT IND 70042: NORMAL
SITE SITE: NORMAL
SOURCE SOURCE: NORMAL

## 2019-12-09 PROCEDURE — 700105 HCHG RX REV CODE 258: Performed by: HOSPITALIST

## 2019-12-09 PROCEDURE — 99239 HOSP IP/OBS DSCHRG MGMT >30: CPT | Performed by: STUDENT IN AN ORGANIZED HEALTH CARE EDUCATION/TRAINING PROGRAM

## 2019-12-09 PROCEDURE — 700102 HCHG RX REV CODE 250 W/ 637 OVERRIDE(OP): Performed by: HOSPITALIST

## 2019-12-09 PROCEDURE — 99232 SBSQ HOSP IP/OBS MODERATE 35: CPT | Performed by: INTERNAL MEDICINE

## 2019-12-09 PROCEDURE — A9270 NON-COVERED ITEM OR SERVICE: HCPCS | Performed by: HOSPITALIST

## 2019-12-09 PROCEDURE — 700111 HCHG RX REV CODE 636 W/ 250 OVERRIDE (IP): Performed by: HOSPITALIST

## 2019-12-09 PROCEDURE — 700111 HCHG RX REV CODE 636 W/ 250 OVERRIDE (IP): Performed by: NURSE PRACTITIONER

## 2019-12-09 PROCEDURE — 700105 HCHG RX REV CODE 258: Performed by: NURSE PRACTITIONER

## 2019-12-09 RX ADMIN — ACETAMINOPHEN 650 MG: 325 TABLET, FILM COATED ORAL at 08:56

## 2019-12-09 RX ADMIN — METHOTREXATE 10 MG: 2.5 TABLET ORAL at 08:52

## 2019-12-09 RX ADMIN — CEFTRIAXONE SODIUM 2 G: 2 INJECTION, POWDER, FOR SOLUTION INTRAMUSCULAR; INTRAVENOUS at 05:42

## 2019-12-09 RX ADMIN — ENOXAPARIN SODIUM 40 MG: 100 INJECTION SUBCUTANEOUS at 05:42

## 2019-12-09 RX ADMIN — DAPTOMYCIN 880 MG: 350 INJECTION, POWDER, LYOPHILIZED, FOR SOLUTION INTRAVENOUS at 11:08

## 2019-12-09 RX ADMIN — VANCOMYCIN HYDROCHLORIDE 1500 MG: 500 INJECTION, POWDER, LYOPHILIZED, FOR SOLUTION INTRAVENOUS at 02:10

## 2019-12-09 RX ADMIN — HYDROCHLOROTHIAZIDE 25 MG: 25 TABLET ORAL at 05:42

## 2019-12-09 RX ADMIN — LISINOPRIL 10 MG: 10 TABLET ORAL at 05:42

## 2019-12-09 RX ADMIN — OXYCODONE HYDROCHLORIDE 5 MG: 5 TABLET ORAL at 02:09

## 2019-12-09 ASSESSMENT — ENCOUNTER SYMPTOMS
COUGH: 0
FEVER: 0
VOMITING: 0
SHORTNESS OF BREATH: 0
CHILLS: 0
HEADACHES: 0
NAUSEA: 0
DIARRHEA: 0
ABDOMINAL PAIN: 0
DIZZINESS: 0

## 2019-12-09 NOTE — CARE PLAN
Problem: Communication  Goal: The ability to communicate needs accurately and effectively will improve  Outcome: PROGRESSING AS EXPECTED   Participation in POC  Problem: Safety  Goal: Will remain free from injury  Outcome: PROGRESSING AS EXPECTED   Fall precautions in place  Problem: Pain Management  Goal: Pain level will decrease to patient's comfort goal  Outcome: PROGRESSING AS EXPECTED   Pain is well controlled with medication per MAR

## 2019-12-09 NOTE — DISCHARGE INSTRUCTIONS
Discharge Instructions    Discharged to home by car with relative. Discharged via wheelchair, hospital escort: Yes.  Special equipment needed: Not Applicable    Be sure to schedule a follow-up appointment with your primary care doctor or any specialists as instructed.     Discharge Plan:   Diet Plan: Discussed  Activity Level: Discussed  Confirmed Follow up Appointment: Patient to Call and Schedule Appointment  Confirmed Symptoms Management: Discussed  Medication Reconciliation Updated: Yes  Influenza Vaccine Indication: Patient Refuses    I understand that a diet low in cholesterol, fat, and sodium is recommended for good health. Unless I have been given specific instructions below for another diet, I accept this instruction as my diet prescription.   Other diet: regular    Special Instructions: None    · Is patient discharged on Warfarin / Coumadin?   No     Depression / Suicide Risk    [facility], it is important to learn how to keep safe from harming yourself.    Recognize the warning signs:  · Abrupt changes in personality, positive or negative- including increase in energy   · Giving away possessions  · Change in eating patterns- significant weight changes-  positive or negative  · Change in sleeping patterns- unable to sleep or sleeping all the time   · Unwillingness or inability to communicate  · Depression  · Unusual sadness, discouragement and loneliness  · Talk of wanting to die  · Neglect of personal appearance   · Rebelliousness- reckless behavior  · Withdrawal from people/activities they love  · Confusion- inability to concentrate     If you or a loved one observes any of these behaviors or has concerns about self-harm, here's what you can do:  · Talk about it- your feelings and reasons for harming yourself  · Remove any means that you might use to hurt yourself (examples: pills, rope, extension cords, firearm)  · Get professional help from the community  (Mental Health, Substance Abuse, psychological counseling)  · Do not be alone:Call your Safe Contact- someone whom you trust who will be there for you.  · Call your local CRISIS HOTLINE 582-8459 or 837-879-8354  · Call your local Children's Mobile Crisis Response Team [place] (790) 525-2410 or www.[employer]  · Call the toll free National Suicide Prevention Hotlines   · National Suicide Prevention Lifeline 664-279-OOEJ (8668)  · National Hope Line Network 800-SUICIDE (392-0178)          Discharge Instructions per JADON York     MEDICATIONS: Please go to the outpatient infusion clinic as scheduled to receive your antibiotic therapy.    FOLLOW-UP : These make an appointment to follow-up with orthopedic surgery for suture/staple removal in 10 to 14 days.    DIET:Diet as tolerated.    ACTIVITY: Weightbearing as tolerated.    DIAGNOSIS:   Encounter Diagnoses   Name Primary?   • Cellulitis of left lower extremity Yes   • Acute postoperative pain of extremity    • Other acute osteomyelitis of left ankle (HCC)      Return to ER if you experience pus or increased drainage from your wound or uncontrolled bleeding.        Central Lines  A central line is a thin tube (catheter) that is put in your vein to give you medicine or food. It may also be used to take blood for lab tests. There are different types of central lines. The type of central line you receive depends on how long it will be needed, your medical condition, and the condition of your veins.   HOME CARE  ·  Follow your doctor's instructions for the type of central line that you have.  · Keep any bandages (dressings) over the central line clean and dry. Change bandages as told by your doctor. Look for redness or irritation during a bandage change.  · Keep the area where the central line goes into your skin clean at all times.    · Rinse out (flush) your central line as told by your doctor.    · Always wash your hands before changing  "bandages or rinsing out the central line.  · If the central line accidentally gets pulled on, make sure that:  ¨  The bandage is okay.  ¨  There is no bleeding.  ¨   The line has not been pulled out.    · Limit lifting, using your arm, or doing other activity as told by your doctor.    · Swim or bathe only if your doctor approves. Your doctor can tell you how to keep your specific type of bandage from getting wet.      GET HELP RIGHT AWAY IF:   · You have chills.  · You have a fever.    · You have shortness of breath or trouble breathing.    · You have chest pain.    · You feel your heart beating fast or \"skipping\" beats.    · You feel dizzy or faint.  · You have bleeding that does not stop from the site of the central line.    · You have pain, redness, puffiness (swelling), or drainage at the site of the central line.    · You have swelling in your neck, face, chest, or arm on the side of your central line.    · Your central line is hard to rinse out.  · You do not get a blood return from the central line.  · You have a hole or tear in your central line.    · Your central line leaks when rinsed out or when fluids are put into it.  MAKE SURE YOU:   · Understand these instructions.    · Will watch your condition.    · Will get help right away if you are not doing well or get worse.       This information is not intended to replace advice given to you by your health care provider. Make sure you discuss any questions you have with your health care provider.     Document Released: 12/04/2013 Document Reviewed: 12/04/2013  Elsevier Interactive Patient Education ©2016 Elsevier Inc.      Depression / Suicide Risk    As you are discharged from this [facility] facility, it is important to learn how to keep safe from harming yourself.    Recognize the warning signs:  · Abrupt changes in personality, positive or negative- including increase in energy   · Giving away possessions  · Change in eating patterns- significant " weight changes-  positive or negative  · Change in sleeping patterns- unable to sleep or sleeping all the time   · Unwillingness or inability to communicate  · Depression  · Unusual sadness, discouragement and loneliness  · Talk of wanting to die  · Neglect of personal appearance   · Rebelliousness- reckless behavior  · Withdrawal from people/activities they love  · Confusion- inability to concentrate     If you or a loved one observes any of these behaviors or has concerns about self-harm, here's what you can do:  · Talk about it- your feelings and reasons for harming yourself  · Remove any means that you might use to hurt yourself (examples: pills, rope, extension cords, firearm)  · Get professional help from the community (Mental Health, Substance Abuse, psychological counseling)  · Do not be alone:Call your Safe Contact- someone whom you trust who will be there for you.  · Call your local CRISIS HOTLINE 796-8730 or 080-399-1204  · Call your local Children's Mobile Crisis Response Team [place] (836) 587-3219 or www.[employer]  · Call the toll free National Suicide Prevention Hotlines   · National Suicide Prevention Lifeline 446-976-SIUB (9985)  · National Hope Line Network 800-SUICIDE (409-0903)        Cellulitis, Adult  Introduction  Cellulitis is a skin infection. The infected area is usually red and sore. This condition occurs most often in the arms and lower legs. It is very important to get treated for this condition.  Follow these instructions at home:  · Take over-the-counter and prescription medicines only as told by your doctor.  · If you were prescribed an antibiotic medicine, take it as told by your doctor. Do not stop taking the antibiotic even if you start to feel better.  · Drink enough fluid to keep your pee (urine) clear or pale yellow.  · Do not touch or rub the infected area.  · Raise (elevate) the infected area above the level of your heart while you are sitting or lying  down.  · Place warm or cold wet cloths (warm or cold compresses) on the infected area. Do this as told by your doctor.  · Keep all follow-up visits as told by your doctor. This is important. These visits let your doctor make sure your infection is not getting worse.  Contact a doctor if:  · You have a fever.  · Your symptoms do not get better after 1-2 days of treatment.  · Your bone or joint under the infected area starts to hurt after the skin has healed.  · Your infection comes back. This can happen in the same area or another area.  · You have a swollen bump in the infected area.  · You have new symptoms.  · You feel ill and also have muscle aches and pains.  Get help right away if:  · Your symptoms get worse.  · You feel very sleepy.  · You throw up (vomit) or have watery poop (diarrhea) for a long time.  · There are red streaks coming from the infected area.  · Your red area gets larger.  · Your red area turns darker.  This information is not intended to replace advice given to you by your health care provider. Make sure you discuss any questions you have with your health care provider.  Document Released: 06/05/2009 Document Revised: 05/25/2017 Document Reviewed: 10/26/2016  © 2017 Elsevier

## 2019-12-09 NOTE — DISCHARGE PLANNING
Anticipated Discharge Disposition: home with [facility]    Action: discussed pt with IDT team. APN  ordered pts first dose of Daptomycin today, if tolerated pt can DC. First [facility] pt appointment is at 1700 on 12/10. Pts following appointments will be between [id number] daily. Verified PICC placement with primary RN.     Barriers to Discharge: tolerating administration of Dapto IV.     Plan: DC 12/9      Length of Stay: 8

## 2019-12-09 NOTE — PROGRESS NOTES
Assumed care at 0700    Received report from NOC shift RN.    Reviewed recent lab rsults, notes, orders, and MAR  POC discussed and updated on care board  Bed is in the lowest and locked position, call light within reach      A&Ox4  RA  C/o headache   Medicated per MAR  Up self ambulatory  PICC  Left ankle dressing CDI due to be changed tomorrow

## 2019-12-09 NOTE — DISCHARGE SUMMARY
"Discharge Summary    CHIEF COMPLAINT ON ADMISSION  Chief Complaint   Patient presents with   • Ankle Pain     reports L ankle fracture in 4/2019 - suden onset pain today with L lateral \"lump\", CMS+.       Reason for Admission  L Ankle Pain     Admission Date  12/1/2019    CODE STATUS  Full Code    HPI & HOSPITAL COURSE   ER Course  Annalise Woodard is a 44 y.o. female who presents to the Emergency Department for left ankle pain onset this morning. She states she woke up with the pain and then when she showered she noticed a \"lump\" on her ankle. Pain worsening throughout day. She denies falling on her ankle or any other trauma.. The pain has been progressing throughout the day. No fever. She had ankle surgery (ORIF s/p triamalleolar fracture) on her left ankle in April and July by Dr. MOODY Padron.  She has lupus and arthritis rheumatoid, and she currently takes methotrexate.    Admission Day Course  44 y.o. female with history of prior ankle fracture status post surgical repair with hardware, systemic lupus erythematosus which is controlled, and essential hypertension on medical regimen, was in her usual state of health which includes regular pain in her ankle which occurs at the end of the day and is improved by awakening, noted something different on the day of admission.  She awoke with ankle pain, and a swelling at the surgical site, which seemed to increase throughout the day instead of improving.  She subsequently presented to the emergency department for further evaluation.  She has no other complaints, including chest pain, shortness of breath, abdominal pain, nausea vomiting, diarrhea or constipation.  No reported fever or chills.    12/4 - s/p I&D and hardware removal.Continue on ceftriaxone and vancomycin  12/5 - Continue IV antibiotics and follow post-op cx.   12/6 - Pain controlled. Post-op cultures no growth to date. Stable for discharge once [facility] can be arranged.   12/7 - 12/8- Antibiotic plan sent " to [facility] by ID , however  not available until Monday. Will stay inpatient until [facility] appointments can be scheduled.   12/9 -[facility] appointment scheduled for tomorrow.  Received 1 dose of daptomycin on the day of discharge and tolerated well.  Hemodynamically stable and no signs of infection. All cultures no growth to date.  Cleared for discharge by orthopedic surgery and infectious disease with outpatient follow-up    Code Status: Full Code  Consultants/Specialty:  Infectious Disease, Dr. Moss   Ortho Surgery, Dr. Pitts  Procedures During Hospitalization:  12/4- I&D and hardwire removal   Lines, Drains, Airways, Wounds  Lines: Right PICC  Drains:  Airway:  Wounds: VTE prophylaxis: lovenox (discontinued upon discharge)       Therefore, she is discharged in good and stable condition to home with close outpatient follow-up.    The patient met 2-midnight criteria for an inpatient stay at the time of discharge.    Discharge Date  12/09/2019    FOLLOW UP ITEMS POST DISCHARGE  Follow-up with [facility] tomorrow.   Follow-up with Orthopedic Surgery in 10-14 days for suture/staple removal .  Follow-up with ID clinic in 2 weeks.   Follow-up with PCP in 10 days.     DISCHARGE DIAGNOSES  Principal Problem:    Cellulitis of left lower extremity POA: Yes  Active Problems:    Other forms of systemic lupus erythematosus (HCC) POA: Yes    Essential hypertension POA: Yes    Rheumatoid arthritis (HCC) POA: Yes  Resolved Problems:    * No resolved hospital problems. *      FOLLOW UP  Future Appointments   Date Time Provider Department Center   12/10/2019  5:00 PM INFUSION QUICK INJECT ON[facility]   12/11/2019  5:00 PM [facility]   12/12/2019  5:00 PM [facility]   12/13/2019  5:00 PM [facility]   12/14/2019  5:15 PM [facility]   12/15/2019  4:45 PM [facility]   12/16/2019  5:00 PM INFUSION [facility]ICK INJECT [facility]   12/17/2019  5:00 [facility]   12/18/2019  5:00 [facility]   12/19/2019  5:00 PM [facility]   12/20/2019  5:00 PM [facility]   12/21/2019  5:15 PM [facility]   12/22/2019  4:45 PM [facility]   12/23/2019  5:00 PM [facility]   12/24/2019  3:15 PM [facility]   12/25/2019  2:00 PM [facility]   12/26/2019  5:15 PM [facility]   12/27/2019  4:15 PM [facility]   12/28/2019  4:30 PM [facility]   12/29/2019  4:45 PM [facility]   12/30/2019  5:30 PM [facility]   12[facility]31/2019  3:45 PM [facility]   1/1/2020  2:45 PM [facility]   1/2/2020  5:00 PM [facility]   1/3/2020  5:00 PM [facility]   1/4/2020  5:15 PM [facility]   1/5/2020  3:45 PM [facility]   1/6/2020  5:00 PM [facility]   1/7/2020  5:00 PM [facility]   1/8/2020  5:00 PM [facility]   1/9/2020  5:00 PM [facility]   1/10/2020  5:00 PM [facility]   1/11/2020  5:15 PM [facility]   1/12/2020  5:15 PM [facility]   1/13/2020  5:00 PM [facility]   1/14/2020  5:00 PM [facility]   1/15/2020  5:00 [facility]     Kali Pitts M.D.  [address]  [facility] [address]  [phone]    Schedule an appointment as soon as possible for a visit in 10 days  For post-operative evaluation, and suture/staple removal ,and wound re-check    Milagros  JADON Chanel  [address] [facility] [address]  [place] [id number] [phone]    Schedule an appointment as soon as possible for a visit in 10 days  Post-hospitalization management    [facility]  [address], [facility] [address]  [place] [phone]  In 2 weeks  For wound re-check, antibitoic managment       MEDICATIONS ON DISCHARGE     Medication List      START taking these medications      Instructions   HYDROcodone-acetaminophen 5-325 MG Tabs per tablet  Commonly known as:  NORCO   Take 1-2 Tabs by mouth every four hours as needed for up to 7 days.  Dose:  1-2 Tab        CONTINUE taking these medications      Instructions   folic acid 1 MG Tabs  Commonly known as:  FOLVITE   Take 1 mg by mouth every day.  Dose:  1 mg     hydroCHLOROthiazide 25 MG Tabs  Commonly known as:  HYDRODIURIL   Take 25 mg by mouth every day.  Dose:  25 mg     hydroxychloroquine 200 MG Tabs  Commonly known as:  PLAQUENIL   Take 200 mg by mouth 2 Times a Day.  Dose:  200 mg     lisinopril 10 MG Tabs  Commonly known as:  PRINIVIL   Take 10 mg by mouth every day.  Dose:  10 mg     methotrexate 2.5 MG Tabs   Take 10 mg by mouth every Monday. On Monday  Dose:  10 mg     vitamin D 1000 UNIT Tabs  Commonly known as:  cholecalciferol   Take 1,000 Units by mouth every day.  Dose:  1,000 Units            Allergies  No Known Allergies    DIET  Orders Placed This Encounter   Procedures   • Diet Order Regular     Standing Status:   Standing     Number of Occurrences:   1     Order Specific Question:   Diet:     Answer:   Regular [1]       ACTIVITY  As tolerated.  Weight bearing as tolerated      LABORATORY  Lab Results   Component Value Date    SODIUM 136 12/05/2019    POTASSIUM 3.8 12/05/2019    CHLORIDE 105 12/05/2019    CO2 24 12/05/2019    GLUCOSE 92 12/05/2019    BUN 12 12/05/2019    CREATININE 0.85 12/05/2019        Lab Results   Component Value Date    WBC 6.7 12/06/2019    HEMOGLOBIN 9.5 (L) 12/06/2019    HEMATOCRIT 30.3  (L) 12/06/2019    PLATELETCT 238 12/06/2019        Total time of the discharge process exceeds 35 minutes.

## 2019-12-09 NOTE — PROGRESS NOTES
POD#5  S/P Hardware removal  All OR cultures NGTD  WBAT  ABX per ID  Case coordination for DC when abx arranged

## 2019-12-09 NOTE — PROGRESS NOTES
Discharging Patient home per physician order.  Discharged with family.  Demonstrated understanding of discharge instructions, follow up appointments, home medications, prescriptions, home care for surgical wound and nursing care instructions for out patient infusion appointments.  Ambulating no assistance, voiding without difficulty, pain well controlled, tolerating oral medications, oxygen saturation greater than 90%, tolerating diet.  Educational handouts    given and discussed.  Verbalized understanding of discharge instructions and educational handouts.  All questions answered.  Belongings with patient at time of discharge.

## 2019-12-09 NOTE — PROGRESS NOTES
Infectious Disease Progress Note    Author: Marleny Moss M.D. Date & Time of service: 2019  10:00 AM    Chief Complaint:  FU for left ankle Hardware infection    Interval History:  44 y.o. woman with a history of rheumatoid arthritis and SLE on Plaquenil and methotrexate, and a history of a left ankle fracture in April status post ORIF followed by one screw removal in 2019 admitted for left ankle swelling, erythema and worsening pain.  Bone scan was indeterminant. S/p HW removal on  AF WBC 5.1 tired. S/p HWR this morning. Post op pain controlled.     AF WBC 7.4 more left ankle pain today after PT. Cx - NGTD. PICC placed  Pt states her injury is a workmans comp issue   T-max 99.7 WBC 6.7.patient's ankle pain is stable.  Workmen's Comp. will pay for outpatient infusions as the patient is not homebound.   afebrile.  Patient sitting up in the chair.  She is ambulating the halls and does have some left ankle pain and stiffness.  She is awaiting for outpatient infusion appointments to be set up   afebrile.  No CBC done today.  Patient is bored and awaiting open appointments to be arranged.  No new symptoms to report    afebrile.  Patient resting in bed.  Her first [facility] appointment is tomorrow.  No new issues to report today      Labs Reviewed.    Review of Systems:  Review of Systems   Constitutional: Negative for chills and fever.   Respiratory: Negative for cough and shortness of breath.    Gastrointestinal: Negative for abdominal pain, diarrhea, nausea and vomiting.   Genitourinary: Negative for dysuria.   Musculoskeletal: Positive for joint pain.        Left ankle stiffness   Neurological: Negative for dizziness and headaches.   All other systems reviewed and are negative.      Hemodynamics:  Temp (24hrs), Av.8 °C (98.2 °F), Min:36.4 °C (97.6 °F), Max:37.1 °C (98.8 °F)  Temperature: 37 °C (98.6 °F)  Pulse  Av.3  Min: 66  Max: 99   Blood Pressure: 124/86        Physical Exam:  Physical Exam  Constitutional:       Appearance: Normal appearance.   HENT:      Head: Normocephalic and atraumatic.      Nose: Nose normal.      Mouth/Throat:      Mouth: Mucous membranes are moist.      Pharynx: No oropharyngeal exudate.   Eyes:      Extraocular Movements: Extraocular movements intact.      Conjunctiva/sclera: Conjunctivae normal.      Pupils: Pupils are equal, round, and reactive to light.   Neck:      Musculoskeletal: Normal range of motion and neck supple.   Cardiovascular:      Rate and Rhythm: Normal rate and regular rhythm.      Heart sounds: No murmur.   Pulmonary:      Effort: Pulmonary effort is normal.      Breath sounds: Normal breath sounds.   Abdominal:      Palpations: Abdomen is soft.      Tenderness: There is no tenderness.   Musculoskeletal:      Comments: Left ankle surgical site.  Dressed.  No drainage through bandage    RUE PICC - nontender, no surrounding erythema   Skin:     General: Skin is warm and dry.   Neurological:      General: No focal deficit present.      Mental Status: She is alert and oriented to person, place, and time.   Psychiatric:         Mood and Affect: Mood normal.         Behavior: Behavior normal.      Comments: Very pleasant         Meds:    Current Facility-Administered Medications:   •  DAPTOmycin  •  vancomycin  •  MD Alert...Vancomycin per Pharmacy  •  cefTRIAXone (ROCEPHIN) IV  •  enoxaparin (LOVENOX) injection  •  methotrexate  •  lisinopril  •  hydroCHLOROthiazide  •  acetaminophen  •  Notify provider if pain remains uncontrolled **AND** Use the numeric rating scale (NRS-11) on regular floors and Critical-Care Pain Observation Tool (CPOT) on ICUs/Trauma to assess pain **AND** Pulse Ox (Oximetry) **AND** Pharmacy Consult Request **AND** If patient difficult to arouse and/or has respiratory depression, stop any opiates that are currently infusing and call a Rapid Response. **AND** oxyCODONE immediate-release **AND** oxyCODONE  immediate-release **AND** morphine injection  •  cloNIDine  •  ondansetron  •  ondansetron  •  promethazine  •  promethazine  •  prochlorperazine  •  senna-docusate **AND** polyethylene glycol/lytes **AND** magnesium hydroxide **AND** bisacodyl    Labs:  No results for input(s): WBC, RBC, HEMOGLOBIN, HEMATOCRIT, MCV, MCH, RDW, PLATELETCT, MPV, NEUTSPOLYS, LYMPHOCYTES, MONOCYTES, EOSINOPHILS, BASOPHILS, RBCMORPHOLO in the last 72 hours.  No results for input(s): SODIUM, POTASSIUM, CHLORIDE, CO2, GLUCOSE, BUN, CPKTOTAL in the last 72 hours.  No results for input(s): ALBUMIN, TBILIRUBIN, ALKPHOSPHAT, TOTPROTEIN, ALTSGPT, ASTSGOT, CREATININE in the last 72 hours.    Imaging:  Dx-ankle 3+ Views Left    Addendum Date: 12/1/2019    Additional clinical history: Infection In light of the above clinical history there is some irregularity in the lateral malleolus cortex. Osteomyelitis is not excluded in this region. Further assessment could be performed with bone scan. Findings were discussed with DANIEL RUIZ II on 12/1/2019 10:04 PM.    Result Date: 12/1/2019 12/1/2019 8:13 PM HISTORY/REASON FOR EXAM:  Pain/Deformity Following Trauma; prior left ankle fracture. TECHNIQUE/EXAM DESCRIPTION AND NUMBER OF VIEWS:  3 views of the  LEFT ankle. COMPARISON: 4/8/2019 FINDINGS: MINERALIZATION: Mineralization is unremarkable for age. INJURY: There has been interval open reduction and internal fixation of the medial and lateral malleolar fractures. There is curvilinear deformity of the underlying bony structures. There is indistinctly visualized lucency extending through the posterior  malleolus. This is similar to the prior study and partially obscured by overlying hardware. There is no evidence of hardware loosening or failure. JOINTS: No erosive arthropathy is evident.  Ankle mortise is symmetric. Distal tibial fibular syndesmosis appears widened.     1.  Interval open reduction and internal fixation of lateral and medial  malleoli fractures 2.  Indistinctly visualized posterior malleolus fracture, likely indicating some amount of incomplete bony fusion of the previously demonstrated posterior malleolar fracture site, though interval re-injury is not excluded 3.  Widened distal tibiofibular syndesmosis, similar to prior    Nm-bone Scan(limited)/flow    Result Date: 12/2/2019 12/2/2019 1:08 PM HISTORY/REASON FOR EXAM:  Foot pain, initial exam; Left ankle cellulitis with concern for osteomyelitis TECHNIQUE/EXAM DESCRIPTION AND NUMBER OF VIEWS:  26.2 mCi Tc 99m-MDP was administered intravenously followed by three-phase imaging of the ankles and feet. COMPARISON:  X-ray 12/1/2019 FINDINGS:     The blood pool images demonstrate increased flow to the left ankle. The blood pool images demonstrate increased activity in the left ankle. The delayed images demonstrate increased activity in the left ankle the region of the distal tibia.     1.  There is increased activity on all 3 phases in the left ankle/distal tibial region . 2.  While this could represent osteomyelitis, the fact that there is fracture in this region based on x-rays, this could also simply represent cellulitis with bony remodeling causing the uptake on the delayed imaging.    Us-extremity Non Vascular Unilateral Left    Result Date: 12/1/2019 12/1/2019 9:29 PM HISTORY/REASON FOR EXAM:  Leukopenia, swelling over distal lateral LEFT leg at site of prior OriF TECHNIQUE/EXAM DESCRIPTION AND NUMBER OF VIEWS: The distal LEFT lateral leg was scanned with grayscale. Selected images were submitted for radiologist review. COMPARISON: None FINDINGS: There is diffuse edema in the soft tissues of the region of interest. No discrete fluid collection is seen.     Diffuse edema which could indicate cellulitis. No abscess visualized.      Micro:  Results     Procedure Component Value Units Date/Time    CULTURE WOUND W/ GRAM STAIN [[id number]] Collected:  12/04/19 0827    Order Status:   Completed Specimen:  Wound Updated:  12/07/19 0729     Significant Indicator NEG     Source WND     Site Left Ankle wound     Culture Result No growth at 72 hours.     Gram Stain Result Rare WBCs.  No organisms seen.      Narrative:       Surgery - swabs received    Anaerobic Culture [[id number]] Collected:  12/04/19 0827    Order Status:  Completed Specimen:  Wound Updated:  12/07/19 0729     Significant Indicator NEG     Source WND     Site Left Ankle wound     Culture Result Culture in progress.    Narrative:       Surgery - swabs received    GRAM STAIN [[id number]] Collected:  12/04/19 0827    Order Status:  Completed Specimen:  Wound Updated:  12/04/19 1427     Significant Indicator .     Source WND     Site Left Ankle wound     Gram Stain Result Rare WBCs.  No organisms seen.      Narrative:       Surgery - swabs received          Assessment:  Active Hospital Problems    Diagnosis   • *Cellulitis of left lower extremity [L03.116]   • Rheumatoid arthritis (HCC) [M06.9]   • Other forms of systemic lupus erythematosus (HCC) [M32.8]     Plan:  Left ankle hardware infection  Underlying osteomyelitis  S/p hardware removal and I&D down to bone on 12/4/19 by Dr. Pitts  OR cultures - NGTD  Continue vanco and ceftriaxone  Monitor renal function and vanco trough  Vanco trough 14.6 today  Plan for 6 weeks of IV abx for hardware infection and osteomyelitis from 12/4  Stop date 01/15/19  Can do daptomycin 8 mg/kg daily and ceftriaxone 2 g daily as outpatient  R-[facility] orders done on 12/6  Give 1 dose of daptomycin 8 mg/kg on day of discharge    H/o left ankle fracture  S/p ORIF in April  Screw removal in August     Rheumatoid arthritis and SLE  On MTX and plaquenil     I have performed a physical exam and reviewed and updated ROS and plan today 12/9/2019.  In review of yesterday's note 12/8/2019, there are no changes except as documented above.    Dispo: [facility].  First appointment on 12/10    FU ID clinic 2 weeks post  discharge    Plan of care discussed with Abbie .  Will sign off.

## 2019-12-10 ENCOUNTER — OUTPATIENT INFUSION SERVICES (OUTPATIENT)
Dept: ONCOLOGY | Facility: MEDICAL CENTER | Age: 44
End: 2019-12-10
Attending: INTERNAL MEDICINE
Payer: COMMERCIAL

## 2019-12-10 VITALS
WEIGHT: 243.39 LBS | SYSTOLIC BLOOD PRESSURE: 128 MMHG | BODY MASS INDEX: 38.2 KG/M2 | HEIGHT: 67 IN | OXYGEN SATURATION: 98 % | TEMPERATURE: 97.7 F | RESPIRATION RATE: 18 BRPM | DIASTOLIC BLOOD PRESSURE: 80 MMHG | HEART RATE: 107 BPM

## 2019-12-10 DIAGNOSIS — M86.9 OSTEOMYELITIS OF LEFT ANKLE, UNSPECIFIED TYPE (HCC): ICD-10-CM

## 2019-12-10 LAB
ALBUMIN SERPL BCP-MCNC: 3.5 G/DL (ref 3.2–4.9)
ALBUMIN/GLOB SERPL: 1.2 G/DL
ALP SERPL-CCNC: 53 U/L (ref 30–99)
ALT SERPL-CCNC: 10 U/L (ref 2–50)
ANION GAP SERPL CALC-SCNC: 6 MMOL/L (ref 0–11.9)
AST SERPL-CCNC: 16 U/L (ref 12–45)
BASOPHILS # BLD AUTO: 0.1 % (ref 0–1.8)
BASOPHILS # BLD: 0.01 K/UL (ref 0–0.12)
BILIRUB SERPL-MCNC: 0.3 MG/DL (ref 0.1–1.5)
BUN SERPL-MCNC: 12 MG/DL (ref 8–22)
CALCIUM SERPL-MCNC: 8.7 MG/DL (ref 8.5–10.5)
CHLORIDE SERPL-SCNC: 107 MMOL/L (ref 96–112)
CK SERPL-CCNC: 60 U/L (ref 0–154)
CO2 SERPL-SCNC: 26 MMOL/L (ref 20–33)
CREAT SERPL-MCNC: 0.73 MG/DL (ref 0.5–1.4)
CRP SERPL HS-MCNC: 1.2 MG/DL (ref 0–0.75)
EOSINOPHIL # BLD AUTO: 0.13 K/UL (ref 0–0.51)
EOSINOPHIL NFR BLD: 1.9 % (ref 0–6.9)
ERYTHROCYTE [DISTWIDTH] IN BLOOD BY AUTOMATED COUNT: 51.4 FL (ref 35.9–50)
ERYTHROCYTE [SEDIMENTATION RATE] IN BLOOD BY WESTERGREN METHOD: 40 MM/HOUR (ref 0–20)
GLOBULIN SER CALC-MCNC: 3 G/DL (ref 1.9–3.5)
GLUCOSE SERPL-MCNC: 97 MG/DL (ref 65–99)
HCT VFR BLD AUTO: 32.7 % (ref 37–47)
HGB BLD-MCNC: 10.2 G/DL (ref 12–16)
IMM GRANULOCYTES # BLD AUTO: 0.02 K/UL (ref 0–0.11)
IMM GRANULOCYTES NFR BLD AUTO: 0.3 % (ref 0–0.9)
LYMPHOCYTES # BLD AUTO: 1.99 K/UL (ref 1–4.8)
LYMPHOCYTES NFR BLD: 28.8 % (ref 22–41)
MCH RBC QN AUTO: 26 PG (ref 27–33)
MCHC RBC AUTO-ENTMCNC: 31.2 G/DL (ref 33.6–35)
MCV RBC AUTO: 83.2 FL (ref 81.4–97.8)
MONOCYTES # BLD AUTO: 0.39 K/UL (ref 0–0.85)
MONOCYTES NFR BLD AUTO: 5.7 % (ref 0–13.4)
NEUTROPHILS # BLD AUTO: 4.36 K/UL (ref 2–7.15)
NEUTROPHILS NFR BLD: 63.2 % (ref 44–72)
NRBC # BLD AUTO: 0 K/UL
NRBC BLD-RTO: 0 /100 WBC
PLATELET # BLD AUTO: 300 K/UL (ref 164–446)
PMV BLD AUTO: 9 FL (ref 9–12.9)
POTASSIUM SERPL-SCNC: 3.9 MMOL/L (ref 3.6–5.5)
PROT SERPL-MCNC: 6.5 G/DL (ref 6–8.2)
RBC # BLD AUTO: 3.93 M/UL (ref 4.2–5.4)
SODIUM SERPL-SCNC: 139 MMOL/L (ref 135–145)
WBC # BLD AUTO: 6.9 K/UL (ref 4.8–10.8)

## 2019-12-10 PROCEDURE — 85025 COMPLETE CBC W/AUTO DIFF WBC: CPT

## 2019-12-10 PROCEDURE — 36592 COLLECT BLOOD FROM PICC: CPT

## 2019-12-10 PROCEDURE — 96365 THER/PROPH/DIAG IV INF INIT: CPT

## 2019-12-10 PROCEDURE — 85652 RBC SED RATE AUTOMATED: CPT

## 2019-12-10 PROCEDURE — 700111 HCHG RX REV CODE 636 W/ 250 OVERRIDE (IP): Performed by: INTERNAL MEDICINE

## 2019-12-10 PROCEDURE — 86140 C-REACTIVE PROTEIN: CPT

## 2019-12-10 PROCEDURE — 96368 THER/DIAG CONCURRENT INF: CPT

## 2019-12-10 PROCEDURE — 700105 HCHG RX REV CODE 258: Performed by: INTERNAL MEDICINE

## 2019-12-10 PROCEDURE — 82550 ASSAY OF CK (CPK): CPT

## 2019-12-10 PROCEDURE — 80053 COMPREHEN METABOLIC PANEL: CPT

## 2019-12-10 RX ORDER — 0.9 % SODIUM CHLORIDE 0.9 %
10-20 VIAL (ML) INJECTION PRN
Status: CANCELLED | OUTPATIENT
Start: 2019-12-11

## 2019-12-10 RX ORDER — 0.9 % SODIUM CHLORIDE 0.9 %
5 VIAL (ML) INJECTION PRN
Status: CANCELLED | OUTPATIENT
Start: 2019-12-11

## 2019-12-10 RX ADMIN — CEFTRIAXONE SODIUM 2 G: 2 INJECTION, POWDER, FOR SOLUTION INTRAMUSCULAR; INTRAVENOUS at 17:35

## 2019-12-10 RX ADMIN — DAPTOMYCIN 880 MG: 500 INJECTION, POWDER, LYOPHILIZED, FOR SOLUTION INTRAVENOUS at 17:39

## 2019-12-11 ENCOUNTER — OUTPATIENT INFUSION SERVICES (OUTPATIENT)
Dept: ONCOLOGY | Facility: MEDICAL CENTER | Age: 44
End: 2019-12-11
Attending: INTERNAL MEDICINE
Payer: COMMERCIAL

## 2019-12-11 VITALS
SYSTOLIC BLOOD PRESSURE: 130 MMHG | TEMPERATURE: 98.8 F | RESPIRATION RATE: 18 BRPM | OXYGEN SATURATION: 95 % | DIASTOLIC BLOOD PRESSURE: 95 MMHG | HEART RATE: 99 BPM

## 2019-12-11 DIAGNOSIS — M86.9 OSTEOMYELITIS OF LEFT ANKLE, UNSPECIFIED TYPE (HCC): ICD-10-CM

## 2019-12-11 PROCEDURE — 96365 THER/PROPH/DIAG IV INF INIT: CPT

## 2019-12-11 PROCEDURE — 96368 THER/DIAG CONCURRENT INF: CPT

## 2019-12-11 PROCEDURE — 700105 HCHG RX REV CODE 258: Performed by: INTERNAL MEDICINE

## 2019-12-11 PROCEDURE — 700111 HCHG RX REV CODE 636 W/ 250 OVERRIDE (IP): Performed by: INTERNAL MEDICINE

## 2019-12-11 RX ORDER — 0.9 % SODIUM CHLORIDE 0.9 %
10-20 VIAL (ML) INJECTION PRN
Status: CANCELLED | OUTPATIENT
Start: 2019-12-12

## 2019-12-11 RX ORDER — 0.9 % SODIUM CHLORIDE 0.9 %
5 VIAL (ML) INJECTION PRN
Status: CANCELLED | OUTPATIENT
Start: 2019-12-12

## 2019-12-11 RX ADMIN — CEFTRIAXONE SODIUM 2 G: 2 INJECTION, POWDER, FOR SOLUTION INTRAMUSCULAR; INTRAVENOUS at 17:15

## 2019-12-11 RX ADMIN — DAPTOMYCIN 880 MG: 500 INJECTION, POWDER, LYOPHILIZED, FOR SOLUTION INTRAVENOUS at 17:15

## 2019-12-11 NOTE — PROGRESS NOTES
Pt arrived ambulatory to [facility] for Rocephin/Dapto infusion. POC discussed with pt and she agrees with plan. Pt with SCOTT PICC line in place. PICC with brisk blood return, labs drawn. Pt medicated per MAR. Pt tolerated infusion without s/s adverse reaction. PICC line with brisk blood return and flushed with NS post infusion. Gauze and mesh sleeve wrapped around PICC site. Pt discharged to self care, NAD. Pt to return tomorrow.

## 2019-12-12 ENCOUNTER — OUTPATIENT INFUSION SERVICES (OUTPATIENT)
Dept: ONCOLOGY | Facility: MEDICAL CENTER | Age: 44
End: 2019-12-12
Attending: INTERNAL MEDICINE
Payer: COMMERCIAL

## 2019-12-12 VITALS
DIASTOLIC BLOOD PRESSURE: 94 MMHG | HEART RATE: 84 BPM | SYSTOLIC BLOOD PRESSURE: 129 MMHG | RESPIRATION RATE: 18 BRPM | TEMPERATURE: 98.6 F | OXYGEN SATURATION: 99 %

## 2019-12-12 DIAGNOSIS — M86.9 OSTEOMYELITIS OF LEFT ANKLE, UNSPECIFIED TYPE (HCC): ICD-10-CM

## 2019-12-12 PROCEDURE — 700111 HCHG RX REV CODE 636 W/ 250 OVERRIDE (IP): Performed by: INTERNAL MEDICINE

## 2019-12-12 PROCEDURE — 96365 THER/PROPH/DIAG IV INF INIT: CPT

## 2019-12-12 PROCEDURE — 700105 HCHG RX REV CODE 258: Performed by: INTERNAL MEDICINE

## 2019-12-12 PROCEDURE — 96368 THER/DIAG CONCURRENT INF: CPT

## 2019-12-12 RX ORDER — 0.9 % SODIUM CHLORIDE 0.9 %
10-20 VIAL (ML) INJECTION PRN
Status: CANCELLED | OUTPATIENT
Start: 2019-12-13

## 2019-12-12 RX ORDER — 0.9 % SODIUM CHLORIDE 0.9 %
5 VIAL (ML) INJECTION PRN
Status: CANCELLED | OUTPATIENT
Start: 2019-12-13

## 2019-12-12 RX ADMIN — DAPTOMYCIN 880 MG: 500 INJECTION, POWDER, LYOPHILIZED, FOR SOLUTION INTRAVENOUS at 17:10

## 2019-12-12 RX ADMIN — CEFTRIAXONE SODIUM 2 G: 2 INJECTION, POWDER, FOR SOLUTION INTRAMUSCULAR; INTRAVENOUS at 17:10

## 2019-12-12 NOTE — PROGRESS NOTES
returns for IVABX. Daptomycin / Rocephin infused as ordered. Annalise tolerated well and without incident. PICC line in good condition and has positive blood return. PICC line flushed with saline per protocol. Annalise ROGERS'd home in good condition and in NAD. Returns daily. Appointment confirm for next treatment.

## 2019-12-13 ENCOUNTER — OUTPATIENT INFUSION SERVICES (OUTPATIENT)
Dept: ONCOLOGY | Facility: MEDICAL CENTER | Age: 44
End: 2019-12-13
Attending: INTERNAL MEDICINE
Payer: COMMERCIAL

## 2019-12-13 VITALS
BODY MASS INDEX: 38.12 KG/M2 | HEART RATE: 92 BPM | TEMPERATURE: 98.1 F | DIASTOLIC BLOOD PRESSURE: 90 MMHG | RESPIRATION RATE: 18 BRPM | OXYGEN SATURATION: 99 % | SYSTOLIC BLOOD PRESSURE: 131 MMHG | WEIGHT: 243.39 LBS

## 2019-12-13 DIAGNOSIS — M86.9 OSTEOMYELITIS OF LEFT ANKLE, UNSPECIFIED TYPE (HCC): ICD-10-CM

## 2019-12-13 PROCEDURE — 700105 HCHG RX REV CODE 258: Performed by: INTERNAL MEDICINE

## 2019-12-13 PROCEDURE — 96365 THER/PROPH/DIAG IV INF INIT: CPT

## 2019-12-13 PROCEDURE — 96368 THER/DIAG CONCURRENT INF: CPT

## 2019-12-13 PROCEDURE — 700111 HCHG RX REV CODE 636 W/ 250 OVERRIDE (IP): Performed by: INTERNAL MEDICINE

## 2019-12-13 RX ORDER — 0.9 % SODIUM CHLORIDE 0.9 %
10-20 VIAL (ML) INJECTION PRN
Status: CANCELLED | OUTPATIENT
Start: 2019-12-14

## 2019-12-13 RX ORDER — 0.9 % SODIUM CHLORIDE 0.9 %
5 VIAL (ML) INJECTION PRN
Status: CANCELLED | OUTPATIENT
Start: 2019-12-14

## 2019-12-13 RX ADMIN — CEFTRIAXONE SODIUM 2 G: 2 INJECTION, POWDER, FOR SOLUTION INTRAMUSCULAR; INTRAVENOUS at 17:04

## 2019-12-13 RX ADMIN — DAPTOMYCIN 880 MG: 500 INJECTION, POWDER, LYOPHILIZED, FOR SOLUTION INTRAVENOUS at 17:03

## 2019-12-14 ENCOUNTER — OUTPATIENT INFUSION SERVICES (OUTPATIENT)
Dept: ONCOLOGY | Facility: MEDICAL CENTER | Age: 44
End: 2019-12-14
Attending: INTERNAL MEDICINE
Payer: COMMERCIAL

## 2019-12-14 VITALS
OXYGEN SATURATION: 99 % | TEMPERATURE: 98.3 F | DIASTOLIC BLOOD PRESSURE: 107 MMHG | HEART RATE: 87 BPM | RESPIRATION RATE: 18 BRPM | SYSTOLIC BLOOD PRESSURE: 143 MMHG

## 2019-12-14 DIAGNOSIS — M86.9 OSTEOMYELITIS OF LEFT ANKLE, UNSPECIFIED TYPE (HCC): ICD-10-CM

## 2019-12-14 PROCEDURE — 96365 THER/PROPH/DIAG IV INF INIT: CPT

## 2019-12-14 PROCEDURE — 700105 HCHG RX REV CODE 258: Performed by: INTERNAL MEDICINE

## 2019-12-14 PROCEDURE — 700111 HCHG RX REV CODE 636 W/ 250 OVERRIDE (IP): Performed by: INTERNAL MEDICINE

## 2019-12-14 PROCEDURE — 96368 THER/DIAG CONCURRENT INF: CPT

## 2019-12-14 RX ORDER — 0.9 % SODIUM CHLORIDE 0.9 %
10-20 VIAL (ML) INJECTION PRN
Status: CANCELLED | OUTPATIENT
Start: 2019-12-15

## 2019-12-14 RX ORDER — 0.9 % SODIUM CHLORIDE 0.9 %
5 VIAL (ML) INJECTION PRN
Status: CANCELLED | OUTPATIENT
Start: 2019-12-15

## 2019-12-14 RX ADMIN — CEFTRIAXONE SODIUM 2 G: 2 INJECTION, POWDER, FOR SOLUTION INTRAMUSCULAR; INTRAVENOUS at 17:24

## 2019-12-14 RX ADMIN — DAPTOMYCIN 880 MG: 500 INJECTION, POWDER, LYOPHILIZED, FOR SOLUTION INTRAVENOUS at 17:24

## 2019-12-14 NOTE — PROGRESS NOTES
Patient here for daily IV antibiotics. Denies any complaits. PICC line in place with brisk blood return when flushed. Antibiotics infused. PICC flushed and secured. Discharged home to self care in no distress at this time. Next appointment in place.

## 2019-12-15 ENCOUNTER — OUTPATIENT INFUSION SERVICES (OUTPATIENT)
Dept: ONCOLOGY | Facility: MEDICAL CENTER | Age: 44
End: 2019-12-15
Attending: INTERNAL MEDICINE
Payer: COMMERCIAL

## 2019-12-15 VITALS
RESPIRATION RATE: 18 BRPM | SYSTOLIC BLOOD PRESSURE: 155 MMHG | DIASTOLIC BLOOD PRESSURE: 100 MMHG | TEMPERATURE: 98.9 F | OXYGEN SATURATION: 99 % | HEART RATE: 89 BPM

## 2019-12-15 DIAGNOSIS — M86.9 OSTEOMYELITIS OF LEFT ANKLE, UNSPECIFIED TYPE (HCC): ICD-10-CM

## 2019-12-15 PROCEDURE — 96368 THER/DIAG CONCURRENT INF: CPT

## 2019-12-15 PROCEDURE — 96365 THER/PROPH/DIAG IV INF INIT: CPT

## 2019-12-15 PROCEDURE — 700105 HCHG RX REV CODE 258: Performed by: INTERNAL MEDICINE

## 2019-12-15 PROCEDURE — 700111 HCHG RX REV CODE 636 W/ 250 OVERRIDE (IP): Performed by: INTERNAL MEDICINE

## 2019-12-15 RX ORDER — 0.9 % SODIUM CHLORIDE 0.9 %
5 VIAL (ML) INJECTION PRN
Status: CANCELLED | OUTPATIENT
Start: 2019-12-16

## 2019-12-15 RX ORDER — 0.9 % SODIUM CHLORIDE 0.9 %
10-20 VIAL (ML) INJECTION PRN
Status: CANCELLED | OUTPATIENT
Start: 2019-12-16

## 2019-12-15 RX ADMIN — DAPTOMYCIN 880 MG: 500 INJECTION, POWDER, LYOPHILIZED, FOR SOLUTION INTRAVENOUS at 17:17

## 2019-12-15 RX ADMIN — CEFTRIAXONE SODIUM 2 G: 2 INJECTION, POWDER, FOR SOLUTION INTRAMUSCULAR; INTRAVENOUS at 17:17

## 2019-12-15 NOTE — PROGRESS NOTES
Pt arrived ambulatory to [facility] for antibiotic infusions. Pt with SCOTT PICC line, brisk blood return noted. Dressing changed per unit protocol. Pt medicated per MAR. Pt tolerated treatment without s/s adverse reaction. Pt discharged to self care, NAD. Pt to return tomorrow.

## 2019-12-16 ENCOUNTER — OUTPATIENT INFUSION SERVICES (OUTPATIENT)
Dept: ONCOLOGY | Facility: MEDICAL CENTER | Age: 44
End: 2019-12-16
Attending: INTERNAL MEDICINE
Payer: COMMERCIAL

## 2019-12-16 VITALS
RESPIRATION RATE: 18 BRPM | HEIGHT: 67 IN | OXYGEN SATURATION: 100 % | BODY MASS INDEX: 38.86 KG/M2 | DIASTOLIC BLOOD PRESSURE: 105 MMHG | TEMPERATURE: 97.1 F | SYSTOLIC BLOOD PRESSURE: 150 MMHG | HEART RATE: 79 BPM | WEIGHT: 247.58 LBS

## 2019-12-16 DIAGNOSIS — M86.9 OSTEOMYELITIS OF LEFT ANKLE, UNSPECIFIED TYPE (HCC): ICD-10-CM

## 2019-12-16 LAB
ALBUMIN SERPL BCP-MCNC: 3.5 G/DL (ref 3.2–4.9)
ALBUMIN/GLOB SERPL: 1 G/DL
ALP SERPL-CCNC: 51 U/L (ref 30–99)
ALT SERPL-CCNC: 9 U/L (ref 2–50)
ANION GAP SERPL CALC-SCNC: 9 MMOL/L (ref 0–11.9)
ANISOCYTOSIS BLD QL SMEAR: ABNORMAL
AST SERPL-CCNC: 15 U/L (ref 12–45)
BASOPHILS # BLD AUTO: 0 % (ref 0–1.8)
BASOPHILS # BLD: 0 K/UL (ref 0–0.12)
BILIRUB SERPL-MCNC: 0.3 MG/DL (ref 0.1–1.5)
BUN SERPL-MCNC: 13 MG/DL (ref 8–22)
CALCIUM SERPL-MCNC: 8.5 MG/DL (ref 8.5–10.5)
CHLORIDE SERPL-SCNC: 106 MMOL/L (ref 96–112)
CK SERPL-CCNC: 102 U/L (ref 0–154)
CO2 SERPL-SCNC: 24 MMOL/L (ref 20–33)
CREAT SERPL-MCNC: 0.76 MG/DL (ref 0.5–1.4)
EOSINOPHIL # BLD AUTO: 0.05 K/UL (ref 0–0.51)
EOSINOPHIL NFR BLD: 0.9 % (ref 0–6.9)
ERYTHROCYTE [DISTWIDTH] IN BLOOD BY AUTOMATED COUNT: 50.7 FL (ref 35.9–50)
GLOBULIN SER CALC-MCNC: 3.4 G/DL (ref 1.9–3.5)
GLUCOSE SERPL-MCNC: 87 MG/DL (ref 65–99)
HCT VFR BLD AUTO: 30.9 % (ref 37–47)
HGB BLD-MCNC: 9.8 G/DL (ref 12–16)
LYMPHOCYTES # BLD AUTO: 1.6 K/UL (ref 1–4.8)
LYMPHOCYTES NFR BLD: 29.1 % (ref 22–41)
MANUAL DIFF BLD: NORMAL
MCH RBC QN AUTO: 26.1 PG (ref 27–33)
MCHC RBC AUTO-ENTMCNC: 31.7 G/DL (ref 33.6–35)
MCV RBC AUTO: 82.2 FL (ref 81.4–97.8)
MICROCYTES BLD QL SMEAR: ABNORMAL
MONOCYTES # BLD AUTO: 0.1 K/UL (ref 0–0.85)
MONOCYTES NFR BLD AUTO: 1.8 % (ref 0–13.4)
MORPHOLOGY BLD-IMP: NORMAL
NEUTROPHILS # BLD AUTO: 3.75 K/UL (ref 2–7.15)
NEUTROPHILS NFR BLD: 68.2 % (ref 44–72)
NRBC # BLD AUTO: 0 K/UL
NRBC BLD-RTO: 0 /100 WBC
OVALOCYTES BLD QL SMEAR: NORMAL
PLATELET # BLD AUTO: 268 K/UL (ref 164–446)
PLATELET BLD QL SMEAR: NORMAL
PMV BLD AUTO: 9.1 FL (ref 9–12.9)
POIKILOCYTOSIS BLD QL SMEAR: NORMAL
POLYCHROMASIA BLD QL SMEAR: NORMAL
POTASSIUM SERPL-SCNC: 3.8 MMOL/L (ref 3.6–5.5)
PROT SERPL-MCNC: 6.9 G/DL (ref 6–8.2)
RBC # BLD AUTO: 3.76 M/UL (ref 4.2–5.4)
RBC BLD AUTO: PRESENT
SODIUM SERPL-SCNC: 139 MMOL/L (ref 135–145)
VARIANT LYMPHS BLD QL SMEAR: NORMAL
WBC # BLD AUTO: 5.5 K/UL (ref 4.8–10.8)

## 2019-12-16 PROCEDURE — 80053 COMPREHEN METABOLIC PANEL: CPT

## 2019-12-16 PROCEDURE — 96367 TX/PROPH/DG ADDL SEQ IV INF: CPT | Performed by: CLINICAL NURSE SPECIALIST

## 2019-12-16 PROCEDURE — 85007 BL SMEAR W/DIFF WBC COUNT: CPT

## 2019-12-16 PROCEDURE — 96365 THER/PROPH/DIAG IV INF INIT: CPT | Performed by: CLINICAL NURSE SPECIALIST

## 2019-12-16 PROCEDURE — 85027 COMPLETE CBC AUTOMATED: CPT

## 2019-12-16 PROCEDURE — 96368 THER/DIAG CONCURRENT INF: CPT

## 2019-12-16 PROCEDURE — 82550 ASSAY OF CK (CPK): CPT

## 2019-12-16 PROCEDURE — 700111 HCHG RX REV CODE 636 W/ 250 OVERRIDE (IP): Performed by: INTERNAL MEDICINE

## 2019-12-16 PROCEDURE — 36592 COLLECT BLOOD FROM PICC: CPT | Performed by: CLINICAL NURSE SPECIALIST

## 2019-12-16 PROCEDURE — 700105 HCHG RX REV CODE 258: Performed by: INTERNAL MEDICINE

## 2019-12-16 RX ORDER — 0.9 % SODIUM CHLORIDE 0.9 %
5 VIAL (ML) INJECTION PRN
Status: CANCELLED | OUTPATIENT
Start: 2019-12-17

## 2019-12-16 RX ORDER — 0.9 % SODIUM CHLORIDE 0.9 %
10-20 VIAL (ML) INJECTION PRN
Status: CANCELLED | OUTPATIENT
Start: 2019-12-17

## 2019-12-16 RX ADMIN — CEFTRIAXONE SODIUM 2 G: 2 INJECTION, POWDER, FOR SOLUTION INTRAMUSCULAR; INTRAVENOUS at 17:12

## 2019-12-16 RX ADMIN — DAPTOMYCIN 880 MG: 500 INJECTION, POWDER, LYOPHILIZED, FOR SOLUTION INTRAVENOUS at 17:14

## 2019-12-16 NOTE — PROGRESS NOTES
Patient seen today for IVAB therapy.  Plan of care discussed.  Assessment completed.  Good blood return from PICC line.  Daptomycin and Rocephin infused as ordered.  Tolerated well and without complaint.  Patient discharged after completion of treatment in good condition, ambulatory.  Returns tomorrow, appointment confirmed.

## 2019-12-17 ENCOUNTER — OUTPATIENT INFUSION SERVICES (OUTPATIENT)
Dept: ONCOLOGY | Facility: MEDICAL CENTER | Age: 44
End: 2019-12-17
Attending: INTERNAL MEDICINE
Payer: COMMERCIAL

## 2019-12-17 VITALS
RESPIRATION RATE: 18 BRPM | HEART RATE: 100 BPM | SYSTOLIC BLOOD PRESSURE: 136 MMHG | OXYGEN SATURATION: 96 % | TEMPERATURE: 97.1 F | DIASTOLIC BLOOD PRESSURE: 104 MMHG

## 2019-12-17 DIAGNOSIS — M86.9 OSTEOMYELITIS OF LEFT ANKLE, UNSPECIFIED TYPE (HCC): ICD-10-CM

## 2019-12-17 PROCEDURE — 700105 HCHG RX REV CODE 258: Performed by: INTERNAL MEDICINE

## 2019-12-17 PROCEDURE — 700111 HCHG RX REV CODE 636 W/ 250 OVERRIDE (IP): Performed by: INTERNAL MEDICINE

## 2019-12-17 PROCEDURE — 96368 THER/DIAG CONCURRENT INF: CPT

## 2019-12-17 PROCEDURE — 96365 THER/PROPH/DIAG IV INF INIT: CPT

## 2019-12-17 RX ORDER — 0.9 % SODIUM CHLORIDE 0.9 %
10-20 VIAL (ML) INJECTION PRN
Status: CANCELLED | OUTPATIENT
Start: 2019-12-18

## 2019-12-17 RX ORDER — 0.9 % SODIUM CHLORIDE 0.9 %
5 VIAL (ML) INJECTION PRN
Status: CANCELLED | OUTPATIENT
Start: 2019-12-18

## 2019-12-17 RX ADMIN — CEFTRIAXONE SODIUM 2 G: 2 INJECTION, POWDER, FOR SOLUTION INTRAMUSCULAR; INTRAVENOUS at 17:22

## 2019-12-17 RX ADMIN — DAPTOMYCIN 880 MG: 500 INJECTION, POWDER, LYOPHILIZED, FOR SOLUTION INTRAVENOUS at 17:22

## 2019-12-17 NOTE — PROGRESS NOTES
Patient to infusion for daily antibiotics.  States she has had loose stools recently.  No other complaints.  PICC flushes well with brisk blood return.  Labs drawn.. Ceftriaxone and daptomycin infused without issue.  PICC flushed, saline locked.  Patient discharged ambulatory to home in stable condition with friend.

## 2019-12-18 ENCOUNTER — OUTPATIENT INFUSION SERVICES (OUTPATIENT)
Dept: ONCOLOGY | Facility: MEDICAL CENTER | Age: 44
End: 2019-12-18
Attending: INTERNAL MEDICINE
Payer: COMMERCIAL

## 2019-12-18 VITALS
SYSTOLIC BLOOD PRESSURE: 137 MMHG | OXYGEN SATURATION: 95 % | RESPIRATION RATE: 18 BRPM | DIASTOLIC BLOOD PRESSURE: 104 MMHG | TEMPERATURE: 99 F

## 2019-12-18 DIAGNOSIS — M86.9 OSTEOMYELITIS OF LEFT ANKLE, UNSPECIFIED TYPE (HCC): ICD-10-CM

## 2019-12-18 PROCEDURE — 700105 HCHG RX REV CODE 258: Performed by: INTERNAL MEDICINE

## 2019-12-18 PROCEDURE — 96368 THER/DIAG CONCURRENT INF: CPT

## 2019-12-18 PROCEDURE — 96365 THER/PROPH/DIAG IV INF INIT: CPT

## 2019-12-18 PROCEDURE — 700111 HCHG RX REV CODE 636 W/ 250 OVERRIDE (IP): Performed by: INTERNAL MEDICINE

## 2019-12-18 RX ORDER — 0.9 % SODIUM CHLORIDE 0.9 %
10-20 VIAL (ML) INJECTION PRN
Status: CANCELLED | OUTPATIENT
Start: 2019-12-19

## 2019-12-18 RX ORDER — 0.9 % SODIUM CHLORIDE 0.9 %
5 VIAL (ML) INJECTION PRN
Status: CANCELLED | OUTPATIENT
Start: 2019-12-19

## 2019-12-18 RX ADMIN — DAPTOMYCIN 880 MG: 500 INJECTION, POWDER, LYOPHILIZED, FOR SOLUTION INTRAVENOUS at 17:13

## 2019-12-18 RX ADMIN — CEFTRIAXONE SODIUM 2 G: 2 INJECTION, POWDER, FOR SOLUTION INTRAMUSCULAR; INTRAVENOUS at 17:13

## 2019-12-18 NOTE — PROGRESS NOTES
Returns for IVAB.  Reports no fatigue or pain.  That usually comes in later evening. Tx infused without rx.  PICC saline flush post.  Reports stitches out, no openings or drainage.  DC to self care.

## 2019-12-19 ENCOUNTER — OUTPATIENT INFUSION SERVICES (OUTPATIENT)
Dept: ONCOLOGY | Facility: MEDICAL CENTER | Age: 44
End: 2019-12-19
Attending: INTERNAL MEDICINE
Payer: COMMERCIAL

## 2019-12-19 VITALS
OXYGEN SATURATION: 97 % | BODY MASS INDEX: 38.63 KG/M2 | RESPIRATION RATE: 18 BRPM | SYSTOLIC BLOOD PRESSURE: 132 MMHG | WEIGHT: 247.58 LBS | HEART RATE: 96 BPM | TEMPERATURE: 98.2 F | DIASTOLIC BLOOD PRESSURE: 89 MMHG

## 2019-12-19 DIAGNOSIS — M86.9 OSTEOMYELITIS OF LEFT ANKLE, UNSPECIFIED TYPE (HCC): ICD-10-CM

## 2019-12-19 PROCEDURE — 700105 HCHG RX REV CODE 258: Performed by: INTERNAL MEDICINE

## 2019-12-19 PROCEDURE — 700111 HCHG RX REV CODE 636 W/ 250 OVERRIDE (IP): Performed by: INTERNAL MEDICINE

## 2019-12-19 PROCEDURE — 96368 THER/DIAG CONCURRENT INF: CPT

## 2019-12-19 PROCEDURE — 96365 THER/PROPH/DIAG IV INF INIT: CPT

## 2019-12-19 RX ORDER — 0.9 % SODIUM CHLORIDE 0.9 %
10-20 VIAL (ML) INJECTION PRN
Status: CANCELLED | OUTPATIENT
Start: 2019-12-20

## 2019-12-19 RX ORDER — 0.9 % SODIUM CHLORIDE 0.9 %
5 VIAL (ML) INJECTION PRN
Status: CANCELLED | OUTPATIENT
Start: 2019-12-20

## 2019-12-19 RX ADMIN — CEFTRIAXONE SODIUM 2 G: 2 INJECTION, POWDER, FOR SOLUTION INTRAMUSCULAR; INTRAVENOUS at 17:27

## 2019-12-19 RX ADMIN — DAPTOMYCIN 880 MG: 500 INJECTION, POWDER, LYOPHILIZED, FOR SOLUTION INTRAVENOUS at 17:31

## 2019-12-19 NOTE — PROGRESS NOTES
Patient presents for daily IV antibiotics. PICC flushes easily with brisk blood return. Daptomycin and Rocephin infused as ordered. PICC flushed per protocol and site secured. Patient returns tomorrow and released in no acute distress.

## 2019-12-20 ENCOUNTER — OUTPATIENT INFUSION SERVICES (OUTPATIENT)
Dept: ONCOLOGY | Facility: MEDICAL CENTER | Age: 44
End: 2019-12-20
Attending: INTERNAL MEDICINE
Payer: COMMERCIAL

## 2019-12-20 VITALS
TEMPERATURE: 97.1 F | HEART RATE: 97 BPM | SYSTOLIC BLOOD PRESSURE: 135 MMHG | OXYGEN SATURATION: 92 % | DIASTOLIC BLOOD PRESSURE: 99 MMHG | RESPIRATION RATE: 18 BRPM

## 2019-12-20 DIAGNOSIS — M86.9 OSTEOMYELITIS OF LEFT ANKLE, UNSPECIFIED TYPE (HCC): ICD-10-CM

## 2019-12-20 PROCEDURE — 700111 HCHG RX REV CODE 636 W/ 250 OVERRIDE (IP): Performed by: INTERNAL MEDICINE

## 2019-12-20 PROCEDURE — 700105 HCHG RX REV CODE 258: Performed by: INTERNAL MEDICINE

## 2019-12-20 PROCEDURE — 96365 THER/PROPH/DIAG IV INF INIT: CPT

## 2019-12-20 PROCEDURE — 96368 THER/DIAG CONCURRENT INF: CPT

## 2019-12-20 RX ORDER — 0.9 % SODIUM CHLORIDE 0.9 %
10-20 VIAL (ML) INJECTION PRN
Status: CANCELLED | OUTPATIENT
Start: 2019-12-21

## 2019-12-20 RX ORDER — 0.9 % SODIUM CHLORIDE 0.9 %
5 VIAL (ML) INJECTION PRN
Status: CANCELLED | OUTPATIENT
Start: 2019-12-21

## 2019-12-20 RX ADMIN — DAPTOMYCIN 880 MG: 500 INJECTION, POWDER, LYOPHILIZED, FOR SOLUTION INTRAVENOUS at 17:10

## 2019-12-20 RX ADMIN — CEFTRIAXONE SODIUM 2 G: 2 INJECTION, POWDER, FOR SOLUTION INTRAMUSCULAR; INTRAVENOUS at 17:10

## 2019-12-20 NOTE — PROGRESS NOTES
Pt presented to infusion center for daily daptomycin and rocephin. Pt reports no significant changes since yesterday. Right arm PICC line in place, flushed and brisk blood return observed. Antibiotics infused with no s/s of adverse reaction. PICC line flushed, brisk blood return again observed, wrapped in gauze and protective sleeve. Has next appt, discharged home to self care.

## 2019-12-21 ENCOUNTER — OUTPATIENT INFUSION SERVICES (OUTPATIENT)
Dept: ONCOLOGY | Facility: MEDICAL CENTER | Age: 44
End: 2019-12-21
Attending: INTERNAL MEDICINE
Payer: COMMERCIAL

## 2019-12-21 VITALS
BODY MASS INDEX: 38.63 KG/M2 | DIASTOLIC BLOOD PRESSURE: 92 MMHG | RESPIRATION RATE: 18 BRPM | SYSTOLIC BLOOD PRESSURE: 130 MMHG | TEMPERATURE: 99.4 F | HEART RATE: 92 BPM | WEIGHT: 247.58 LBS | OXYGEN SATURATION: 98 %

## 2019-12-21 DIAGNOSIS — M86.9 OSTEOMYELITIS OF LEFT ANKLE, UNSPECIFIED TYPE (HCC): ICD-10-CM

## 2019-12-21 PROCEDURE — 700111 HCHG RX REV CODE 636 W/ 250 OVERRIDE (IP): Performed by: INTERNAL MEDICINE

## 2019-12-21 PROCEDURE — 96368 THER/DIAG CONCURRENT INF: CPT

## 2019-12-21 PROCEDURE — 96365 THER/PROPH/DIAG IV INF INIT: CPT

## 2019-12-21 PROCEDURE — 700105 HCHG RX REV CODE 258: Performed by: INTERNAL MEDICINE

## 2019-12-21 RX ORDER — 0.9 % SODIUM CHLORIDE 0.9 %
10-20 VIAL (ML) INJECTION PRN
Status: CANCELLED | OUTPATIENT
Start: 2019-12-22

## 2019-12-21 RX ORDER — 0.9 % SODIUM CHLORIDE 0.9 %
5 VIAL (ML) INJECTION PRN
Status: CANCELLED | OUTPATIENT
Start: 2019-12-22

## 2019-12-21 RX ADMIN — DAPTOMYCIN 880 MG: 500 INJECTION, POWDER, LYOPHILIZED, FOR SOLUTION INTRAVENOUS at 17:35

## 2019-12-21 RX ADMIN — CEFTRIAXONE SODIUM 2 G: 2 INJECTION, POWDER, FOR SOLUTION INTRAMUSCULAR; INTRAVENOUS at 17:35

## 2019-12-21 NOTE — PROGRESS NOTES
Pt returns to [facility] for Daptomycin and Rocephin infusion.  Pt voices no new complaints.  RUE PICC flushed and brisk blood return noted.  PICC clave changed today.   Pt tolerated infusions without reaction.  PICC flushed and site secured with netting sleeve.   Pt dc home to self care.

## 2019-12-22 ENCOUNTER — OUTPATIENT INFUSION SERVICES (OUTPATIENT)
Dept: ONCOLOGY | Facility: MEDICAL CENTER | Age: 44
End: 2019-12-22
Attending: INTERNAL MEDICINE
Payer: COMMERCIAL

## 2019-12-22 VITALS
OXYGEN SATURATION: 98 % | RESPIRATION RATE: 18 BRPM | DIASTOLIC BLOOD PRESSURE: 98 MMHG | HEART RATE: 86 BPM | SYSTOLIC BLOOD PRESSURE: 126 MMHG | TEMPERATURE: 98.4 F

## 2019-12-22 DIAGNOSIS — M86.9 OSTEOMYELITIS OF LEFT ANKLE, UNSPECIFIED TYPE (HCC): ICD-10-CM

## 2019-12-22 LAB
CRP SERPL HS-MCNC: 0.32 MG/DL (ref 0–0.75)
ERYTHROCYTE [SEDIMENTATION RATE] IN BLOOD BY WESTERGREN METHOD: 56 MM/HOUR (ref 0–20)

## 2019-12-22 PROCEDURE — 85652 RBC SED RATE AUTOMATED: CPT

## 2019-12-22 PROCEDURE — 96368 THER/DIAG CONCURRENT INF: CPT

## 2019-12-22 PROCEDURE — 36592 COLLECT BLOOD FROM PICC: CPT

## 2019-12-22 PROCEDURE — 700111 HCHG RX REV CODE 636 W/ 250 OVERRIDE (IP): Performed by: INTERNAL MEDICINE

## 2019-12-22 PROCEDURE — 86140 C-REACTIVE PROTEIN: CPT

## 2019-12-22 PROCEDURE — 96365 THER/PROPH/DIAG IV INF INIT: CPT

## 2019-12-22 PROCEDURE — 700105 HCHG RX REV CODE 258: Performed by: INTERNAL MEDICINE

## 2019-12-22 RX ORDER — 0.9 % SODIUM CHLORIDE 0.9 %
10-20 VIAL (ML) INJECTION PRN
Status: CANCELLED | OUTPATIENT
Start: 2019-12-23

## 2019-12-22 RX ORDER — 0.9 % SODIUM CHLORIDE 0.9 %
5 VIAL (ML) INJECTION PRN
Status: CANCELLED | OUTPATIENT
Start: 2019-12-23

## 2019-12-22 RX ADMIN — CEFTRIAXONE SODIUM 2 G: 2 INJECTION, POWDER, FOR SOLUTION INTRAMUSCULAR; INTRAVENOUS at 17:09

## 2019-12-22 RX ADMIN — DAPTOMYCIN 880 MG: 500 INJECTION, POWDER, LYOPHILIZED, FOR SOLUTION INTRAVENOUS at 17:09

## 2019-12-22 NOTE — PROGRESS NOTES
Patient returns for her daily appointment. PICC lines with +blood return, medicated per MAR. Patient denies any changes in condition from yesterday. PICC dressing changed per policy. Reviewed following appointments.

## 2019-12-23 ENCOUNTER — OUTPATIENT INFUSION SERVICES (OUTPATIENT)
Dept: ONCOLOGY | Facility: MEDICAL CENTER | Age: 44
End: 2019-12-23
Attending: INTERNAL MEDICINE
Payer: COMMERCIAL

## 2019-12-23 VITALS
WEIGHT: 244.05 LBS | SYSTOLIC BLOOD PRESSURE: 132 MMHG | TEMPERATURE: 98.2 F | OXYGEN SATURATION: 98 % | HEART RATE: 85 BPM | BODY MASS INDEX: 39.22 KG/M2 | HEIGHT: 66 IN | DIASTOLIC BLOOD PRESSURE: 99 MMHG | RESPIRATION RATE: 18 BRPM

## 2019-12-23 DIAGNOSIS — M86.9 OSTEOMYELITIS OF LEFT ANKLE, UNSPECIFIED TYPE (HCC): ICD-10-CM

## 2019-12-23 LAB
ALBUMIN SERPL BCP-MCNC: 3.9 G/DL (ref 3.2–4.9)
ALBUMIN/GLOB SERPL: 1.2 G/DL
ALP SERPL-CCNC: 54 U/L (ref 30–99)
ALT SERPL-CCNC: 11 U/L (ref 2–50)
ANION GAP SERPL CALC-SCNC: 7 MMOL/L (ref 0–11.9)
AST SERPL-CCNC: 15 U/L (ref 12–45)
BASOPHILS # BLD AUTO: 0.3 % (ref 0–1.8)
BASOPHILS # BLD: 0.02 K/UL (ref 0–0.12)
BILIRUB SERPL-MCNC: 0.3 MG/DL (ref 0.1–1.5)
BUN SERPL-MCNC: 15 MG/DL (ref 8–22)
CALCIUM SERPL-MCNC: 9 MG/DL (ref 8.5–10.5)
CHLORIDE SERPL-SCNC: 107 MMOL/L (ref 96–112)
CK SERPL-CCNC: 126 U/L (ref 0–154)
CO2 SERPL-SCNC: 23 MMOL/L (ref 20–33)
CREAT SERPL-MCNC: 0.87 MG/DL (ref 0.5–1.4)
EOSINOPHIL # BLD AUTO: 0.22 K/UL (ref 0–0.51)
EOSINOPHIL NFR BLD: 3.6 % (ref 0–6.9)
ERYTHROCYTE [DISTWIDTH] IN BLOOD BY AUTOMATED COUNT: 49.9 FL (ref 35.9–50)
GLOBULIN SER CALC-MCNC: 3.2 G/DL (ref 1.9–3.5)
GLUCOSE SERPL-MCNC: 86 MG/DL (ref 65–99)
HCT VFR BLD AUTO: 32.9 % (ref 37–47)
HGB BLD-MCNC: 10.1 G/DL (ref 12–16)
IMM GRANULOCYTES # BLD AUTO: 0.01 K/UL (ref 0–0.11)
IMM GRANULOCYTES NFR BLD AUTO: 0.2 % (ref 0–0.9)
LYMPHOCYTES # BLD AUTO: 2.26 K/UL (ref 1–4.8)
LYMPHOCYTES NFR BLD: 37.5 % (ref 22–41)
MCH RBC QN AUTO: 25 PG (ref 27–33)
MCHC RBC AUTO-ENTMCNC: 30.7 G/DL (ref 33.6–35)
MCV RBC AUTO: 81.4 FL (ref 81.4–97.8)
MONOCYTES # BLD AUTO: 0.48 K/UL (ref 0–0.85)
MONOCYTES NFR BLD AUTO: 8 % (ref 0–13.4)
NEUTROPHILS # BLD AUTO: 3.04 K/UL (ref 2–7.15)
NEUTROPHILS NFR BLD: 50.4 % (ref 44–72)
NRBC # BLD AUTO: 0 K/UL
NRBC BLD-RTO: 0 /100 WBC
PLATELET # BLD AUTO: 266 K/UL (ref 164–446)
PMV BLD AUTO: 9.2 FL (ref 9–12.9)
POTASSIUM SERPL-SCNC: 3.9 MMOL/L (ref 3.6–5.5)
PROT SERPL-MCNC: 7.1 G/DL (ref 6–8.2)
RBC # BLD AUTO: 4.04 M/UL (ref 4.2–5.4)
SODIUM SERPL-SCNC: 137 MMOL/L (ref 135–145)
WBC # BLD AUTO: 6 K/UL (ref 4.8–10.8)

## 2019-12-23 PROCEDURE — 36592 COLLECT BLOOD FROM PICC: CPT

## 2019-12-23 PROCEDURE — 700111 HCHG RX REV CODE 636 W/ 250 OVERRIDE (IP): Performed by: INTERNAL MEDICINE

## 2019-12-23 PROCEDURE — 85025 COMPLETE CBC W/AUTO DIFF WBC: CPT

## 2019-12-23 PROCEDURE — 82550 ASSAY OF CK (CPK): CPT

## 2019-12-23 PROCEDURE — 96368 THER/DIAG CONCURRENT INF: CPT

## 2019-12-23 PROCEDURE — 96365 THER/PROPH/DIAG IV INF INIT: CPT

## 2019-12-23 PROCEDURE — 700105 HCHG RX REV CODE 258: Performed by: INTERNAL MEDICINE

## 2019-12-23 PROCEDURE — 80053 COMPREHEN METABOLIC PANEL: CPT

## 2019-12-23 RX ORDER — 0.9 % SODIUM CHLORIDE 0.9 %
10-20 VIAL (ML) INJECTION PRN
Status: CANCELLED | OUTPATIENT
Start: 2019-12-24

## 2019-12-23 RX ORDER — 0.9 % SODIUM CHLORIDE 0.9 %
5 VIAL (ML) INJECTION PRN
Status: CANCELLED | OUTPATIENT
Start: 2019-12-24

## 2019-12-23 RX ADMIN — DAPTOMYCIN 880 MG: 500 INJECTION, POWDER, LYOPHILIZED, FOR SOLUTION INTRAVENOUS at 17:13

## 2019-12-23 RX ADMIN — CEFTRIAXONE SODIUM 2 G: 2 INJECTION, POWDER, FOR SOLUTION INTRAMUSCULAR; INTRAVENOUS at 17:15

## 2019-12-23 NOTE — PROGRESS NOTES
Pt arrived ambulatory to [facility] for antibiotic infusion. SCOTT PICC with brisk blood return. Pt medicated per MAR. Pt tolerated treatment without s/s adverse reaction. Pt discharged to self care, NAD. PICC dressing reinforced today as edge was curling. Pt to return tomorrow.

## 2019-12-24 ENCOUNTER — APPOINTMENT (OUTPATIENT)
Dept: INFECTIOUS DISEASES | Facility: MEDICAL CENTER | Age: 44
End: 2019-12-24
Payer: COMMERCIAL

## 2019-12-24 ENCOUNTER — OUTPATIENT INFUSION SERVICES (OUTPATIENT)
Dept: ONCOLOGY | Facility: MEDICAL CENTER | Age: 44
End: 2019-12-24
Attending: INTERNAL MEDICINE
Payer: COMMERCIAL

## 2019-12-24 VITALS
DIASTOLIC BLOOD PRESSURE: 89 MMHG | RESPIRATION RATE: 18 BRPM | OXYGEN SATURATION: 98 % | SYSTOLIC BLOOD PRESSURE: 124 MMHG | HEART RATE: 82 BPM | TEMPERATURE: 97.5 F

## 2019-12-24 DIAGNOSIS — M86.9 OSTEOMYELITIS OF LEFT ANKLE, UNSPECIFIED TYPE (HCC): ICD-10-CM

## 2019-12-24 PROCEDURE — 96368 THER/DIAG CONCURRENT INF: CPT

## 2019-12-24 PROCEDURE — 700111 HCHG RX REV CODE 636 W/ 250 OVERRIDE (IP): Performed by: INTERNAL MEDICINE

## 2019-12-24 PROCEDURE — 700105 HCHG RX REV CODE 258: Performed by: INTERNAL MEDICINE

## 2019-12-24 PROCEDURE — 96365 THER/PROPH/DIAG IV INF INIT: CPT

## 2019-12-24 RX ORDER — 0.9 % SODIUM CHLORIDE 0.9 %
10-20 VIAL (ML) INJECTION PRN
Status: CANCELLED | OUTPATIENT
Start: 2019-12-25

## 2019-12-24 RX ORDER — 0.9 % SODIUM CHLORIDE 0.9 %
5 VIAL (ML) INJECTION PRN
Status: CANCELLED | OUTPATIENT
Start: 2019-12-25

## 2019-12-24 RX ADMIN — DAPTOMYCIN 880 MG: 500 INJECTION, POWDER, LYOPHILIZED, FOR SOLUTION INTRAVENOUS at 13:52

## 2019-12-24 RX ADMIN — CEFTRIAXONE SODIUM 2 G: 2 INJECTION, POWDER, FOR SOLUTION INTRAMUSCULAR; INTRAVENOUS at 13:28

## 2019-12-24 NOTE — PROGRESS NOTES
Patient presents to clinic for Daptomycin infusion. Pt denies any changes condition. PICC flushed, brisk blood return noted. Cap changed per protocol. Daptomycin infused as ordered. Pt tolerated infusion well with no complications. PICC flushed and saline locked. Pt discharged in stable, ambulatory condition.

## 2019-12-24 NOTE — PROGRESS NOTES
Pt arrived ambulatory to [facility] for antibiotic infusion. SCOTT PICC line with brisk blood return. Monday labs drawn and sent to lab. Pt medicated per MAR. Pt tolerated treatment without s/s adverse reaction. Pt discharged to self care, NAD. Pt to return tomorrow.

## 2019-12-25 ENCOUNTER — OUTPATIENT INFUSION SERVICES (OUTPATIENT)
Dept: ONCOLOGY | Facility: MEDICAL CENTER | Age: 44
End: 2019-12-25
Attending: INTERNAL MEDICINE
Payer: COMMERCIAL

## 2019-12-25 VITALS
HEART RATE: 100 BPM | RESPIRATION RATE: 18 BRPM | DIASTOLIC BLOOD PRESSURE: 87 MMHG | TEMPERATURE: 97.2 F | OXYGEN SATURATION: 100 % | SYSTOLIC BLOOD PRESSURE: 119 MMHG

## 2019-12-25 DIAGNOSIS — M86.9 OSTEOMYELITIS OF LEFT ANKLE, UNSPECIFIED TYPE (HCC): ICD-10-CM

## 2019-12-25 PROCEDURE — 96368 THER/DIAG CONCURRENT INF: CPT

## 2019-12-25 PROCEDURE — 700105 HCHG RX REV CODE 258: Performed by: INTERNAL MEDICINE

## 2019-12-25 PROCEDURE — 96365 THER/PROPH/DIAG IV INF INIT: CPT

## 2019-12-25 PROCEDURE — 700111 HCHG RX REV CODE 636 W/ 250 OVERRIDE (IP): Performed by: INTERNAL MEDICINE

## 2019-12-25 RX ORDER — 0.9 % SODIUM CHLORIDE 0.9 %
10-20 VIAL (ML) INJECTION PRN
Status: CANCELLED | OUTPATIENT
Start: 2019-12-26

## 2019-12-25 RX ORDER — 0.9 % SODIUM CHLORIDE 0.9 %
5 VIAL (ML) INJECTION PRN
Status: CANCELLED | OUTPATIENT
Start: 2019-12-26

## 2019-12-25 RX ADMIN — CEFTRIAXONE SODIUM 2 G: 2 INJECTION, POWDER, FOR SOLUTION INTRAMUSCULAR; INTRAVENOUS at 14:10

## 2019-12-25 RX ADMIN — DAPTOMYCIN 880 MG: 500 INJECTION, POWDER, LYOPHILIZED, FOR SOLUTION INTRAVENOUS at 14:10

## 2019-12-25 NOTE — PROGRESS NOTES
Patient presents to clinic for Daptomycin infusion. Pt denies any changes condition. PICC flushed, brisk blood return noted. Daptomycin infused as ordered. Pt tolerated infusion well with no complications. PICC flushed and saline locked. Pt discharged in stable, ambulatory condition.

## 2019-12-26 ENCOUNTER — OUTPATIENT INFUSION SERVICES (OUTPATIENT)
Dept: ONCOLOGY | Facility: MEDICAL CENTER | Age: 44
End: 2019-12-26
Attending: INTERNAL MEDICINE
Payer: COMMERCIAL

## 2019-12-26 VITALS
RESPIRATION RATE: 18 BRPM | HEART RATE: 73 BPM | DIASTOLIC BLOOD PRESSURE: 85 MMHG | TEMPERATURE: 98 F | SYSTOLIC BLOOD PRESSURE: 126 MMHG | WEIGHT: 244.05 LBS | BODY MASS INDEX: 39.39 KG/M2 | OXYGEN SATURATION: 98 %

## 2019-12-26 DIAGNOSIS — M86.9 OSTEOMYELITIS OF LEFT ANKLE, UNSPECIFIED TYPE (HCC): ICD-10-CM

## 2019-12-26 PROCEDURE — 700111 HCHG RX REV CODE 636 W/ 250 OVERRIDE (IP): Performed by: INTERNAL MEDICINE

## 2019-12-26 PROCEDURE — 96365 THER/PROPH/DIAG IV INF INIT: CPT

## 2019-12-26 PROCEDURE — 700105 HCHG RX REV CODE 258: Performed by: INTERNAL MEDICINE

## 2019-12-26 PROCEDURE — 96368 THER/DIAG CONCURRENT INF: CPT

## 2019-12-26 RX ORDER — 0.9 % SODIUM CHLORIDE 0.9 %
10-20 VIAL (ML) INJECTION PRN
Status: CANCELLED | OUTPATIENT
Start: 2019-12-27

## 2019-12-26 RX ORDER — 0.9 % SODIUM CHLORIDE 0.9 %
5 VIAL (ML) INJECTION PRN
Status: CANCELLED | OUTPATIENT
Start: 2019-12-27

## 2019-12-26 RX ADMIN — CEFTRIAXONE SODIUM 2 G: 2 INJECTION, POWDER, FOR SOLUTION INTRAMUSCULAR; INTRAVENOUS at 17:18

## 2019-12-26 RX ADMIN — DAPTOMYCIN 880 MG: 500 INJECTION, POWDER, LYOPHILIZED, FOR SOLUTION INTRAVENOUS at 17:18

## 2019-12-27 ENCOUNTER — OUTPATIENT INFUSION SERVICES (OUTPATIENT)
Dept: ONCOLOGY | Facility: MEDICAL CENTER | Age: 44
End: 2019-12-27
Attending: INTERNAL MEDICINE
Payer: COMMERCIAL

## 2019-12-27 VITALS
SYSTOLIC BLOOD PRESSURE: 145 MMHG | HEART RATE: 86 BPM | TEMPERATURE: 98 F | OXYGEN SATURATION: 100 % | DIASTOLIC BLOOD PRESSURE: 106 MMHG | RESPIRATION RATE: 18 BRPM

## 2019-12-27 DIAGNOSIS — M86.9 OSTEOMYELITIS OF LEFT ANKLE, UNSPECIFIED TYPE (HCC): ICD-10-CM

## 2019-12-27 PROCEDURE — 700105 HCHG RX REV CODE 258: Performed by: INTERNAL MEDICINE

## 2019-12-27 PROCEDURE — 96368 THER/DIAG CONCURRENT INF: CPT

## 2019-12-27 PROCEDURE — 700111 HCHG RX REV CODE 636 W/ 250 OVERRIDE (IP): Performed by: INTERNAL MEDICINE

## 2019-12-27 PROCEDURE — 96365 THER/PROPH/DIAG IV INF INIT: CPT

## 2019-12-27 RX ORDER — 0.9 % SODIUM CHLORIDE 0.9 %
5 VIAL (ML) INJECTION PRN
Status: CANCELLED | OUTPATIENT
Start: 2019-12-28

## 2019-12-27 RX ORDER — 0.9 % SODIUM CHLORIDE 0.9 %
10-20 VIAL (ML) INJECTION PRN
Status: CANCELLED | OUTPATIENT
Start: 2019-12-28

## 2019-12-27 RX ADMIN — DAPTOMYCIN 880 MG: 500 INJECTION, POWDER, LYOPHILIZED, FOR SOLUTION INTRAVENOUS at 16:26

## 2019-12-27 RX ADMIN — CEFTRIAXONE SODIUM 2 G: 2 INJECTION, POWDER, FOR SOLUTION INTRAMUSCULAR; INTRAVENOUS at 16:26

## 2019-12-27 NOTE — PROGRESS NOTES
Patient presents to clinic for Daptomycin/Invanz infusions. Pt denies any changes condition. PICC flushed, brisk blood return noted. Daptomycin and Invanz infused as ordered. Pt tolerated infusion well with no complications. PICC flushed and saline locked. Pt discharged in stable, ambulatory condition.

## 2019-12-28 ENCOUNTER — OUTPATIENT INFUSION SERVICES (OUTPATIENT)
Dept: ONCOLOGY | Facility: MEDICAL CENTER | Age: 44
End: 2019-12-28
Attending: INTERNAL MEDICINE
Payer: COMMERCIAL

## 2019-12-28 VITALS
DIASTOLIC BLOOD PRESSURE: 88 MMHG | TEMPERATURE: 98.1 F | HEART RATE: 78 BPM | SYSTOLIC BLOOD PRESSURE: 127 MMHG | BODY MASS INDEX: 39.39 KG/M2 | OXYGEN SATURATION: 98 % | WEIGHT: 244.05 LBS | RESPIRATION RATE: 18 BRPM

## 2019-12-28 DIAGNOSIS — M86.9 OSTEOMYELITIS OF LEFT ANKLE, UNSPECIFIED TYPE (HCC): ICD-10-CM

## 2019-12-28 PROCEDURE — 96365 THER/PROPH/DIAG IV INF INIT: CPT

## 2019-12-28 PROCEDURE — 96368 THER/DIAG CONCURRENT INF: CPT

## 2019-12-28 PROCEDURE — 700111 HCHG RX REV CODE 636 W/ 250 OVERRIDE (IP): Performed by: INTERNAL MEDICINE

## 2019-12-28 PROCEDURE — 700105 HCHG RX REV CODE 258: Performed by: INTERNAL MEDICINE

## 2019-12-28 RX ORDER — 0.9 % SODIUM CHLORIDE 0.9 %
10-20 VIAL (ML) INJECTION PRN
Status: CANCELLED | OUTPATIENT
Start: 2019-12-29

## 2019-12-28 RX ORDER — 0.9 % SODIUM CHLORIDE 0.9 %
5 VIAL (ML) INJECTION PRN
Status: CANCELLED | OUTPATIENT
Start: 2019-12-29

## 2019-12-28 RX ADMIN — DAPTOMYCIN 880 MG: 500 INJECTION, POWDER, LYOPHILIZED, FOR SOLUTION INTRAVENOUS at 16:31

## 2019-12-28 RX ADMIN — CEFTRIAXONE SODIUM 2 G: 2 INJECTION, POWDER, FOR SOLUTION INTRAMUSCULAR; INTRAVENOUS at 16:31

## 2019-12-28 NOTE — PROGRESS NOTES
Pt returns to [facility] for daily Rocephin/Daptomycin.  Pt voices no new complaints today.  RUE PICC flushed and brisk blood return observed.  PICC clave changed today.  Antibiotics infused without reaction.  PICC flushed with NS and site secured.  Confirmed tomorrow's appt with pt.  Pt dc home with family.

## 2019-12-29 ENCOUNTER — OUTPATIENT INFUSION SERVICES (OUTPATIENT)
Dept: ONCOLOGY | Facility: MEDICAL CENTER | Age: 44
End: 2019-12-29
Attending: INTERNAL MEDICINE
Payer: COMMERCIAL

## 2019-12-29 VITALS
OXYGEN SATURATION: 100 % | DIASTOLIC BLOOD PRESSURE: 98 MMHG | HEART RATE: 94 BPM | SYSTOLIC BLOOD PRESSURE: 130 MMHG | RESPIRATION RATE: 18 BRPM | TEMPERATURE: 99.2 F

## 2019-12-29 DIAGNOSIS — M86.9 OSTEOMYELITIS OF LEFT ANKLE, UNSPECIFIED TYPE (HCC): ICD-10-CM

## 2019-12-29 PROCEDURE — 700105 HCHG RX REV CODE 258: Performed by: INTERNAL MEDICINE

## 2019-12-29 PROCEDURE — 700111 HCHG RX REV CODE 636 W/ 250 OVERRIDE (IP): Performed by: INTERNAL MEDICINE

## 2019-12-29 PROCEDURE — 96365 THER/PROPH/DIAG IV INF INIT: CPT

## 2019-12-29 PROCEDURE — 96368 THER/DIAG CONCURRENT INF: CPT

## 2019-12-29 RX ORDER — 0.9 % SODIUM CHLORIDE 0.9 %
10-20 VIAL (ML) INJECTION PRN
Status: CANCELLED | OUTPATIENT
Start: 2019-12-30

## 2019-12-29 RX ORDER — 0.9 % SODIUM CHLORIDE 0.9 %
5 VIAL (ML) INJECTION PRN
Status: CANCELLED | OUTPATIENT
Start: 2019-12-30

## 2019-12-29 RX ADMIN — CEFTRIAXONE SODIUM 2 G: 2 INJECTION, POWDER, FOR SOLUTION INTRAMUSCULAR; INTRAVENOUS at 17:01

## 2019-12-29 RX ADMIN — DAPTOMYCIN 880 MG: 500 INJECTION, POWDER, LYOPHILIZED, FOR SOLUTION INTRAVENOUS at 17:01

## 2019-12-29 NOTE — PROGRESS NOTES
Patient arrived ambulatory to the [facility] for Daptomycin/Rocephin. Reviewed vital signs, labs, and physician order. Patient arrives with SL PICC to [facility], visualized brisk blood return. Daptomycin, and Rocephin administered, no adverse reaction observed. PICC flushed per protocol, dressing changed with sterile technique, 4X4 gauze, and mesh sleeve placed for protection. Confirmed upcoming appointment date and time with patient. Patient left the [facility] ambulatory in no sign of distress.

## 2019-12-30 ENCOUNTER — OUTPATIENT INFUSION SERVICES (OUTPATIENT)
Dept: ONCOLOGY | Facility: MEDICAL CENTER | Age: 44
End: 2019-12-30
Attending: INTERNAL MEDICINE
Payer: COMMERCIAL

## 2019-12-30 VITALS
HEART RATE: 83 BPM | RESPIRATION RATE: 18 BRPM | BODY MASS INDEX: 39.4 KG/M2 | HEIGHT: 66 IN | TEMPERATURE: 97.6 F | WEIGHT: 245.15 LBS | DIASTOLIC BLOOD PRESSURE: 90 MMHG | OXYGEN SATURATION: 98 % | SYSTOLIC BLOOD PRESSURE: 126 MMHG

## 2019-12-30 DIAGNOSIS — M86.9 OSTEOMYELITIS OF LEFT ANKLE, UNSPECIFIED TYPE (HCC): ICD-10-CM

## 2019-12-30 LAB
ALBUMIN SERPL BCP-MCNC: 3.8 G/DL (ref 3.2–4.9)
ALBUMIN/GLOB SERPL: 1.4 G/DL
ALP SERPL-CCNC: 54 U/L (ref 30–99)
ALT SERPL-CCNC: 10 U/L (ref 2–50)
ANION GAP SERPL CALC-SCNC: 7 MMOL/L (ref 0–11.9)
AST SERPL-CCNC: 14 U/L (ref 12–45)
BASOPHILS # BLD AUTO: 0.2 % (ref 0–1.8)
BASOPHILS # BLD: 0.01 K/UL (ref 0–0.12)
BILIRUB SERPL-MCNC: 0.3 MG/DL (ref 0.1–1.5)
BUN SERPL-MCNC: 16 MG/DL (ref 8–22)
CALCIUM SERPL-MCNC: 8.6 MG/DL (ref 8.5–10.5)
CHLORIDE SERPL-SCNC: 108 MMOL/L (ref 96–112)
CK SERPL-CCNC: 146 U/L (ref 0–154)
CO2 SERPL-SCNC: 24 MMOL/L (ref 20–33)
CREAT SERPL-MCNC: 0.83 MG/DL (ref 0.5–1.4)
CRP SERPL HS-MCNC: 1.03 MG/DL (ref 0–0.75)
EOSINOPHIL # BLD AUTO: 0.23 K/UL (ref 0–0.51)
EOSINOPHIL NFR BLD: 3.9 % (ref 0–6.9)
ERYTHROCYTE [DISTWIDTH] IN BLOOD BY AUTOMATED COUNT: 51 FL (ref 35.9–50)
ERYTHROCYTE [SEDIMENTATION RATE] IN BLOOD BY WESTERGREN METHOD: 20 MM/HOUR (ref 0–20)
GLOBULIN SER CALC-MCNC: 2.8 G/DL (ref 1.9–3.5)
GLUCOSE SERPL-MCNC: 90 MG/DL (ref 65–99)
HCT VFR BLD AUTO: 31.3 % (ref 37–47)
HGB BLD-MCNC: 9.6 G/DL (ref 12–16)
IMM GRANULOCYTES # BLD AUTO: 0.02 K/UL (ref 0–0.11)
IMM GRANULOCYTES NFR BLD AUTO: 0.3 % (ref 0–0.9)
LYMPHOCYTES # BLD AUTO: 1.67 K/UL (ref 1–4.8)
LYMPHOCYTES NFR BLD: 28.4 % (ref 22–41)
MCH RBC QN AUTO: 25.1 PG (ref 27–33)
MCHC RBC AUTO-ENTMCNC: 30.7 G/DL (ref 33.6–35)
MCV RBC AUTO: 81.7 FL (ref 81.4–97.8)
MONOCYTES # BLD AUTO: 0.43 K/UL (ref 0–0.85)
MONOCYTES NFR BLD AUTO: 7.3 % (ref 0–13.4)
NEUTROPHILS # BLD AUTO: 3.53 K/UL (ref 2–7.15)
NEUTROPHILS NFR BLD: 59.9 % (ref 44–72)
NRBC # BLD AUTO: 0 K/UL
NRBC BLD-RTO: 0 /100 WBC
PLATELET # BLD AUTO: 208 K/UL (ref 164–446)
PMV BLD AUTO: 9.2 FL (ref 9–12.9)
POTASSIUM SERPL-SCNC: 4 MMOL/L (ref 3.6–5.5)
PROT SERPL-MCNC: 6.6 G/DL (ref 6–8.2)
RBC # BLD AUTO: 3.83 M/UL (ref 4.2–5.4)
SODIUM SERPL-SCNC: 139 MMOL/L (ref 135–145)
WBC # BLD AUTO: 5.9 K/UL (ref 4.8–10.8)

## 2019-12-30 PROCEDURE — 36592 COLLECT BLOOD FROM PICC: CPT

## 2019-12-30 PROCEDURE — 82550 ASSAY OF CK (CPK): CPT

## 2019-12-30 PROCEDURE — 96365 THER/PROPH/DIAG IV INF INIT: CPT

## 2019-12-30 PROCEDURE — 700105 HCHG RX REV CODE 258: Performed by: INTERNAL MEDICINE

## 2019-12-30 PROCEDURE — 96368 THER/DIAG CONCURRENT INF: CPT

## 2019-12-30 PROCEDURE — 85652 RBC SED RATE AUTOMATED: CPT

## 2019-12-30 PROCEDURE — 700111 HCHG RX REV CODE 636 W/ 250 OVERRIDE (IP): Performed by: INTERNAL MEDICINE

## 2019-12-30 PROCEDURE — 85025 COMPLETE CBC W/AUTO DIFF WBC: CPT

## 2019-12-30 PROCEDURE — 80053 COMPREHEN METABOLIC PANEL: CPT

## 2019-12-30 PROCEDURE — 86140 C-REACTIVE PROTEIN: CPT

## 2019-12-30 RX ORDER — 0.9 % SODIUM CHLORIDE 0.9 %
10-20 VIAL (ML) INJECTION PRN
Status: CANCELLED | OUTPATIENT
Start: 2019-12-31

## 2019-12-30 RX ORDER — 0.9 % SODIUM CHLORIDE 0.9 %
5 VIAL (ML) INJECTION PRN
Status: CANCELLED | OUTPATIENT
Start: 2019-12-31

## 2019-12-30 RX ADMIN — CEFTRIAXONE SODIUM 2 G: 2 INJECTION, POWDER, FOR SOLUTION INTRAMUSCULAR; INTRAVENOUS at 17:53

## 2019-12-30 RX ADMIN — DAPTOMYCIN 880 MG: 500 INJECTION, POWDER, LYOPHILIZED, FOR SOLUTION INTRAVENOUS at 17:53

## 2019-12-30 NOTE — PROGRESS NOTES
Patient returns to infusion services for IVABX treatment. Patient denies any changes in the past 24 hours. PICC + blood return. Patient medicated per MAR. PICC SL. Confirmed remaining appointments with patients. Patient discharged self care to home. Pt ambulates steady gait up independently.

## 2019-12-31 ENCOUNTER — OFFICE VISIT (OUTPATIENT)
Dept: INFECTIOUS DISEASES | Facility: MEDICAL CENTER | Age: 44
End: 2019-12-31
Payer: COMMERCIAL

## 2019-12-31 ENCOUNTER — OUTPATIENT INFUSION SERVICES (OUTPATIENT)
Dept: ONCOLOGY | Facility: MEDICAL CENTER | Age: 44
End: 2019-12-31
Attending: INTERNAL MEDICINE
Payer: COMMERCIAL

## 2019-12-31 VITALS
OXYGEN SATURATION: 100 % | HEIGHT: 66 IN | BODY MASS INDEX: 39.37 KG/M2 | TEMPERATURE: 98.9 F | WEIGHT: 245 LBS | DIASTOLIC BLOOD PRESSURE: 86 MMHG | SYSTOLIC BLOOD PRESSURE: 120 MMHG | HEART RATE: 87 BPM

## 2019-12-31 VITALS
TEMPERATURE: 99.5 F | DIASTOLIC BLOOD PRESSURE: 91 MMHG | HEART RATE: 91 BPM | SYSTOLIC BLOOD PRESSURE: 131 MMHG | RESPIRATION RATE: 18 BRPM | OXYGEN SATURATION: 100 %

## 2019-12-31 DIAGNOSIS — M86.9 OSTEOMYELITIS OF LEFT ANKLE, UNSPECIFIED TYPE (HCC): ICD-10-CM

## 2019-12-31 DIAGNOSIS — M32.8 OTHER FORMS OF SYSTEMIC LUPUS ERYTHEMATOSUS, UNSPECIFIED ORGAN INVOLVEMENT STATUS (HCC): ICD-10-CM

## 2019-12-31 DIAGNOSIS — M06.9 RHEUMATOID ARTHRITIS, INVOLVING UNSPECIFIED SITE, UNSPECIFIED RHEUMATOID FACTOR PRESENCE: ICD-10-CM

## 2019-12-31 PROCEDURE — 96365 THER/PROPH/DIAG IV INF INIT: CPT

## 2019-12-31 PROCEDURE — 700105 HCHG RX REV CODE 258: Performed by: INTERNAL MEDICINE

## 2019-12-31 PROCEDURE — 700111 HCHG RX REV CODE 636 W/ 250 OVERRIDE (IP): Performed by: INTERNAL MEDICINE

## 2019-12-31 PROCEDURE — 99214 OFFICE O/P EST MOD 30 MIN: CPT | Performed by: INTERNAL MEDICINE

## 2019-12-31 PROCEDURE — 96368 THER/DIAG CONCURRENT INF: CPT

## 2019-12-31 RX ORDER — 0.9 % SODIUM CHLORIDE 0.9 %
10-20 VIAL (ML) INJECTION PRN
Status: CANCELLED | OUTPATIENT
Start: 2020-01-01

## 2019-12-31 RX ORDER — 0.9 % SODIUM CHLORIDE 0.9 %
5 VIAL (ML) INJECTION PRN
Status: CANCELLED | OUTPATIENT
Start: 2020-01-01

## 2019-12-31 RX ADMIN — DAPTOMYCIN 880 MG: 500 INJECTION, POWDER, LYOPHILIZED, FOR SOLUTION INTRAVENOUS at 16:05

## 2019-12-31 RX ADMIN — CEFTRIAXONE SODIUM 2 G: 2 INJECTION, POWDER, FOR SOLUTION INTRAMUSCULAR; INTRAVENOUS at 16:05

## 2019-12-31 NOTE — PROGRESS NOTES
Chief Complaint   Patient presents with   • Hospital Follow-up     Left ankle Hardware infection       HISTORY OF PRESENT ILLNESS: Patient is a 44 y.o. female established patient who presents today here as hosp FU  +rheumatoid arthritis and SLE on Plaquenil and methotrexate, and a history of a left ankle fracture in April status post ORIF followed by one screw removal in August 2019 admitted for left ankle swelling, erythema and worsening pain 12/1-12/9/2019.  Bone scan was indeterminant.   S/p hardware removal on 12/4  Cxs neg  Discharged on dapto +ceftriaxone-denies SE  Compliant with infusion per records  Creat and CPK stable-last   Last ESR normalized-down to 20  Was having RA flares almost every day in hosp-only one flare since discharge  Next appt ADON[facility] 1/9/2020    Patient Active Problem List    Diagnosis Date Noted   • Cellulitis of left lower extremity 12/01/2019     Priority: High   • Rheumatoid arthritis (HCC) 12/01/2019     Priority: Low   • Other forms of systemic lupus erythematosus (HCC) 04/08/2019     Priority: Low   • Essential hypertension 04/08/2019     Priority: Low   • Osteomyelitis of left ankle (HCC) 12/06/2019   • Mild intermittent asthma without complication 04/08/2019       Allergies:Patient has no known allergies.    Current Outpatient Medications   Medication Sig Dispense Refill   • hydroxychloroquine (PLAQUENIL) 200 MG Tab Take 200 mg by mouth 2 Times a Day.  3   • lisinopril (PRINIVIL) 10 MG Tab Take 10 mg by mouth every day.     • vitamin D (CHOLECALCIFEROL) 1000 UNIT Tab Take 1,000 Units by mouth every day.     • folic acid (FOLVITE) 1 MG Tab Take 1 mg by mouth every day.     • methotrexate 2.5 MG Tab Take 10 mg by mouth every Monday. On Monday      • hydroCHLOROthiazide (HYDRODIURIL) 25 MG Tab Take 25 mg by mouth every day.       No current facility-administered medications for this visit.        Social History     Tobacco Use   • Smoking status: Never Smoker   • Smokeless  "tobacco: Never Used   Substance Use Topics   • Alcohol use: Yes     Frequency: 2-4 times a month     Drinks per session: 1 or 2     Binge frequency: Never   • Drug use: No       Family History   Problem Relation Age of Onset   • Heart Attack Mother        ROS:  Review of Systems   Constitutional: Negative for fever, chills, weight loss and malaise/fatigue.   Musculoskeletal: + joint pain.   Skin: Negative for rash and itching.     All other systems reviewed and are negative except as in HPI.      Exam:  /86 (BP Location: Right arm, Patient Position: Sitting, BP Cuff Size: Adult)   Pulse 87   Temp 37.2 °C (98.9 °F) (Temporal)   Ht 1.68 m (5' 6.14\")   Wt 111.1 kg (245 lb)   SpO2 100%   General:  Well nourished, well developed female in NAD. Pleasant and cooperative  Head: NCAT, Pupils are equal round reactive to light. Extraocular movements intact. Oropharynx is clear.  Neck: Supple.  No LAD  Pulmonary: Clear to ausculation.  No rales, rhonchi, or wheezing. Unlabored  Cardiovascular: Regular rate and rhythm without murmur. No ectopy  Abdominal: Soft, nontender, nondistended. Positive bowel sounds. No hepatosplenomegaly.  Skin: No rashes.hyperpigementation around surgical sites-wellhealed medial-lateral healed except for white ?suture sticking out No drainage RUE PICC nontender  Extremities: no clubbing, cyanosis, or edema.  Neurologic: Alert and oriented x4. Speech is fluent without dysarthria. Cranial nerves intact. Moving all extremities. Gait within normal limits.    Lab Results   Component Value Date/Time    WBC 5.9 12/30/2019 05:50 PM    RBC 3.83 (L) 12/30/2019 05:50 PM    HEMOGLOBIN 9.6 (L) 12/30/2019 05:50 PM    HEMATOCRIT 31.3 (L) 12/30/2019 05:50 PM    MCV 81.7 12/30/2019 05:50 PM    MCH 25.1 (L) 12/30/2019 05:50 PM    MCHC 30.7 (L) 12/30/2019 05:50 PM    MPV 9.2 12/30/2019 05:50 PM    NEUTSPOLYS 59.90 12/30/2019 05:50 PM    LYMPHOCYTES 28.40 12/30/2019 05:50 PM    MONOCYTES 7.30 12/30/2019 05:50 " PM    EOSINOPHILS 3.90 12/30/2019 05:50 PM    BASOPHILS 0.20 12/30/2019 05:50 PM    ANISOCYTOSIS 1+ 12/16/2019 05:10 PM      Lab Results   Component Value Date/Time    SODIUM 139 12/30/2019 05:50 PM    POTASSIUM 4.0 12/30/2019 05:50 PM    CHLORIDE 108 12/30/2019 05:50 PM    CO2 24 12/30/2019 05:50 PM    GLUCOSE 90 12/30/2019 05:50 PM    BUN 16 12/30/2019 05:50 PM    CREATININE 0.83 12/30/2019 05:50 PM        Assessment/Plan:  1. Other forms of systemic lupus erythematosus, unspecified organ involvement status ([facility])     2. Rheumatoid arthritis, involving unspecified site, unspecified rheumatoid factor presence ([facility])     3. Osteomyelitis of left ankle, unspecified type ([facility])        Left ankle hardware infection  Underlying osteomyelitis  S/p hardware removal and I&D down to bone on 12/4/19 by Dr. Pitts  OR cultures - NGTD  Continue Dapt and ceftriaxone  Continue weekly labs  Plan for 6 weeks of IV abx for hardware infection and osteomyelitis from 12/4  Stop date 01/15/19     Left ankle fracture  S/p ORIF in April  Screw removal in August due to infection  Surgical site almost completely healed except for white fiber/?suture sticking out of it-ADONIS eval as soon as possible     Rheumatoid arthritis and SLE  Immunosuppressed-  On MTX and plaquenil     Saundra Rendon M.D.

## 2019-12-31 NOTE — PROGRESS NOTES
Pt presented to infusion center for daily daptomycin and ceftriaxone. Pt reports no significant changes since yesterday. Right arm PICC line in place, flushed and brisk blood return observed. Labs drawn. Antibiotics infused with no s/s of adverse reaction. PICC line flushed, brisk blood return again observed, wrapped in gauze and protective sleeve. Has next appt, discharged home to self care.

## 2020-01-01 ENCOUNTER — OUTPATIENT INFUSION SERVICES (OUTPATIENT)
Dept: ONCOLOGY | Facility: MEDICAL CENTER | Age: 45
End: 2020-01-01
Attending: INTERNAL MEDICINE
Payer: COMMERCIAL

## 2020-01-01 VITALS
RESPIRATION RATE: 18 BRPM | HEART RATE: 84 BPM | OXYGEN SATURATION: 99 % | DIASTOLIC BLOOD PRESSURE: 95 MMHG | SYSTOLIC BLOOD PRESSURE: 140 MMHG | TEMPERATURE: 98.7 F

## 2020-01-01 DIAGNOSIS — M86.9 OSTEOMYELITIS OF LEFT ANKLE, UNSPECIFIED TYPE (HCC): ICD-10-CM

## 2020-01-01 PROCEDURE — 700111 HCHG RX REV CODE 636 W/ 250 OVERRIDE (IP): Performed by: INTERNAL MEDICINE

## 2020-01-01 PROCEDURE — 700105 HCHG RX REV CODE 258: Performed by: INTERNAL MEDICINE

## 2020-01-01 PROCEDURE — 96365 THER/PROPH/DIAG IV INF INIT: CPT

## 2020-01-01 PROCEDURE — 96368 THER/DIAG CONCURRENT INF: CPT

## 2020-01-01 RX ORDER — 0.9 % SODIUM CHLORIDE 0.9 %
10-20 VIAL (ML) INJECTION PRN
Status: CANCELLED | OUTPATIENT
Start: 2020-01-02

## 2020-01-01 RX ORDER — 0.9 % SODIUM CHLORIDE 0.9 %
5 VIAL (ML) INJECTION PRN
Status: CANCELLED | OUTPATIENT
Start: 2020-01-02

## 2020-01-01 RX ADMIN — CEFTRIAXONE SODIUM 2 G: 2 INJECTION, POWDER, FOR SOLUTION INTRAMUSCULAR; INTRAVENOUS at 14:54

## 2020-01-01 RX ADMIN — DAPTOMYCIN 880 MG: 500 INJECTION, POWDER, LYOPHILIZED, FOR SOLUTION INTRAVENOUS at 14:53

## 2020-01-01 NOTE — PROGRESS NOTES
Pt arrived to [facility], ambulatory, for Dapto/Rocephin. Pt voices no complaints. PICC line flushed per policy, positive blood return noted. Dapto and rocephin infused with no s/sx of adverse reaction. PICC line flushed per policy. Pt left [facility] with no s/sx of distress. Follow up appointment confirmed.

## 2020-01-01 NOTE — PROGRESS NOTES
Patient arrived to infusion center with family for daily antibiotic infusion of Daptomycin and Rocephin. Patient had no new complaints today. PICC line flushed with positive blood return. Both antibiotics infused without difficulty. PICC line flushed with saline, and covered with gauze and netting. Patient confirms appointment for tomorrow at 1445. Discharged from infusion center with family and no apparent distress.

## 2020-01-02 ENCOUNTER — OUTPATIENT INFUSION SERVICES (OUTPATIENT)
Dept: ONCOLOGY | Facility: MEDICAL CENTER | Age: 45
End: 2020-01-02
Attending: INTERNAL MEDICINE
Payer: COMMERCIAL

## 2020-01-02 VITALS
OXYGEN SATURATION: 100 % | HEIGHT: 66 IN | HEART RATE: 91 BPM | RESPIRATION RATE: 18 BRPM | DIASTOLIC BLOOD PRESSURE: 85 MMHG | SYSTOLIC BLOOD PRESSURE: 139 MMHG | BODY MASS INDEX: 39.33 KG/M2 | TEMPERATURE: 97.9 F | WEIGHT: 244.71 LBS

## 2020-01-02 DIAGNOSIS — M86.9 OSTEOMYELITIS OF LEFT ANKLE, UNSPECIFIED TYPE (HCC): ICD-10-CM

## 2020-01-02 PROCEDURE — 700105 HCHG RX REV CODE 258: Performed by: INTERNAL MEDICINE

## 2020-01-02 PROCEDURE — 700111 HCHG RX REV CODE 636 W/ 250 OVERRIDE (IP): Performed by: INTERNAL MEDICINE

## 2020-01-02 PROCEDURE — 96368 THER/DIAG CONCURRENT INF: CPT

## 2020-01-02 PROCEDURE — 96365 THER/PROPH/DIAG IV INF INIT: CPT

## 2020-01-02 RX ORDER — 0.9 % SODIUM CHLORIDE 0.9 %
10-20 VIAL (ML) INJECTION PRN
Status: CANCELLED | OUTPATIENT
Start: 2020-01-03

## 2020-01-02 RX ORDER — 0.9 % SODIUM CHLORIDE 0.9 %
5 VIAL (ML) INJECTION PRN
Status: CANCELLED | OUTPATIENT
Start: 2020-01-03

## 2020-01-02 RX ADMIN — DAPTOMYCIN 880 MG: 500 INJECTION, POWDER, LYOPHILIZED, FOR SOLUTION INTRAVENOUS at 17:19

## 2020-01-02 RX ADMIN — CEFTRIAXONE SODIUM 2 G: 2 INJECTION, POWDER, FOR SOLUTION INTRAMUSCULAR; INTRAVENOUS at 17:19

## 2020-01-02 NOTE — DOCUMENTATION QUERY
"                                                                         [facility]                                                                       Query Response Note      PATIENT:               GABE AL  ACCT #:                  [id number]  MRN:                     [id number]  :                      1975  ADMIT DATE:       2019 7:50 PM  DISCH DATE:        2019 1:47 PM  RESPONDING  PROVIDER #:        [id number]           QUERY TEXT:    \"Left ankle hardware infection\" is documented in the infectious disease consult notes.  However, it is not noted in elsewhere in the Progress notes or DC Summary.    Please clarify status of this condition:    NOTE:  If an appropriate response is not listed below, please respond with a new note.       The patient's Clinical Indicators include:  Left ankle fracture status post surgical repair with hardware  Systemic lupus erythematosus  HTN   Rheumatoid Arthritis   Left lower extremity cellulitis  Options provided:   -- Left ankle hardware infection is ruled in   -- Left ankle hardware infection is  ruled out   -- Unable to determine      Query created by: Sawallisch, Beth on 2019 2:11 PM    RESPONSE TEXT:    Left ankle hardware infection is ruled in          Electronically signed by:  JOCELYN KOWALSKI 2020 8:31 AM              "

## 2020-01-03 ENCOUNTER — OUTPATIENT INFUSION SERVICES (OUTPATIENT)
Dept: ONCOLOGY | Facility: MEDICAL CENTER | Age: 45
End: 2020-01-03
Attending: INTERNAL MEDICINE
Payer: COMMERCIAL

## 2020-01-03 VITALS
RESPIRATION RATE: 18 BRPM | OXYGEN SATURATION: 100 % | HEART RATE: 85 BPM | DIASTOLIC BLOOD PRESSURE: 98 MMHG | TEMPERATURE: 98.7 F | SYSTOLIC BLOOD PRESSURE: 137 MMHG

## 2020-01-03 DIAGNOSIS — M86.9 OSTEOMYELITIS OF LEFT ANKLE, UNSPECIFIED TYPE (HCC): ICD-10-CM

## 2020-01-03 PROCEDURE — 96365 THER/PROPH/DIAG IV INF INIT: CPT

## 2020-01-03 PROCEDURE — 700111 HCHG RX REV CODE 636 W/ 250 OVERRIDE (IP): Performed by: INTERNAL MEDICINE

## 2020-01-03 PROCEDURE — 700105 HCHG RX REV CODE 258: Performed by: INTERNAL MEDICINE

## 2020-01-03 PROCEDURE — 96368 THER/DIAG CONCURRENT INF: CPT

## 2020-01-03 RX ORDER — 0.9 % SODIUM CHLORIDE 0.9 %
10-20 VIAL (ML) INJECTION PRN
Status: CANCELLED | OUTPATIENT
Start: 2020-01-04

## 2020-01-03 RX ORDER — 0.9 % SODIUM CHLORIDE 0.9 %
5 VIAL (ML) INJECTION PRN
Status: CANCELLED | OUTPATIENT
Start: 2020-01-04

## 2020-01-03 RX ADMIN — CEFTRIAXONE SODIUM 2 G: 2 INJECTION, POWDER, FOR SOLUTION INTRAMUSCULAR; INTRAVENOUS at 17:22

## 2020-01-03 RX ADMIN — DAPTOMYCIN 880 MG: 500 INJECTION, POWDER, LYOPHILIZED, FOR SOLUTION INTRAVENOUS at 17:25

## 2020-01-03 NOTE — PROGRESS NOTES
Pt arrived ambulatory to \[facility]\"". Pt states she is feeling well. PICC with brisk blood return noted. Pt medicated per MAR. Pt tolerated treatment without s/s adverse reaction. Pt discharged to self care, NAD. Pt to return tomorrow.

## 2020-01-04 ENCOUNTER — OUTPATIENT INFUSION SERVICES (OUTPATIENT)
Dept: ONCOLOGY | Facility: MEDICAL CENTER | Age: 45
End: 2020-01-04
Attending: INTERNAL MEDICINE
Payer: COMMERCIAL

## 2020-01-04 VITALS
HEART RATE: 85 BPM | OXYGEN SATURATION: 100 % | TEMPERATURE: 98.9 F | DIASTOLIC BLOOD PRESSURE: 99 MMHG | RESPIRATION RATE: 18 BRPM | SYSTOLIC BLOOD PRESSURE: 141 MMHG

## 2020-01-04 DIAGNOSIS — M86.9 OSTEOMYELITIS OF LEFT ANKLE, UNSPECIFIED TYPE (HCC): ICD-10-CM

## 2020-01-04 PROCEDURE — 96368 THER/DIAG CONCURRENT INF: CPT

## 2020-01-04 PROCEDURE — 700105 HCHG RX REV CODE 258: Performed by: INTERNAL MEDICINE

## 2020-01-04 PROCEDURE — 96365 THER/PROPH/DIAG IV INF INIT: CPT

## 2020-01-04 PROCEDURE — 700111 HCHG RX REV CODE 636 W/ 250 OVERRIDE (IP): Performed by: INTERNAL MEDICINE

## 2020-01-04 RX ORDER — 0.9 % SODIUM CHLORIDE 0.9 %
10-20 VIAL (ML) INJECTION PRN
Status: CANCELLED | OUTPATIENT
Start: 2020-01-05

## 2020-01-04 RX ORDER — 0.9 % SODIUM CHLORIDE 0.9 %
5 VIAL (ML) INJECTION PRN
Status: CANCELLED | OUTPATIENT
Start: 2020-01-05

## 2020-01-04 RX ADMIN — CEFTRIAXONE SODIUM 2 G: 2 INJECTION, POWDER, FOR SOLUTION INTRAMUSCULAR; INTRAVENOUS at 17:34

## 2020-01-04 RX ADMIN — DAPTOMYCIN 880 MG: 500 INJECTION, POWDER, LYOPHILIZED, FOR SOLUTION INTRAVENOUS at 17:34

## 2020-01-04 NOTE — PROGRESS NOTES
Pt presented to infusion center for daily daptomycin and ceftriaxone. Pt reports no significant changes since yesterday. Right arm PICC line in place, flushed and brisk blood return observed. Antibiotics infused with no s/s of adverse reaction. PICC line flushed, brisk blood return again observed, wrapped in gauze and protective sleeve. Has next appt, discharged home to self care.

## 2020-01-05 ENCOUNTER — OUTPATIENT INFUSION SERVICES (OUTPATIENT)
Dept: ONCOLOGY | Facility: MEDICAL CENTER | Age: 45
End: 2020-01-05
Attending: INTERNAL MEDICINE
Payer: COMMERCIAL

## 2020-01-05 VITALS
TEMPERATURE: 98.4 F | OXYGEN SATURATION: 100 % | HEART RATE: 89 BPM | RESPIRATION RATE: 18 BRPM | DIASTOLIC BLOOD PRESSURE: 87 MMHG | SYSTOLIC BLOOD PRESSURE: 121 MMHG

## 2020-01-05 DIAGNOSIS — M86.9 OSTEOMYELITIS OF LEFT ANKLE, UNSPECIFIED TYPE (HCC): ICD-10-CM

## 2020-01-05 PROCEDURE — 700105 HCHG RX REV CODE 258: Performed by: INTERNAL MEDICINE

## 2020-01-05 PROCEDURE — 96365 THER/PROPH/DIAG IV INF INIT: CPT

## 2020-01-05 PROCEDURE — 96368 THER/DIAG CONCURRENT INF: CPT

## 2020-01-05 PROCEDURE — 700111 HCHG RX REV CODE 636 W/ 250 OVERRIDE (IP): Performed by: INTERNAL MEDICINE

## 2020-01-05 RX ORDER — 0.9 % SODIUM CHLORIDE 0.9 %
10-20 VIAL (ML) INJECTION PRN
Status: CANCELLED | OUTPATIENT
Start: 2020-01-06

## 2020-01-05 RX ORDER — 0.9 % SODIUM CHLORIDE 0.9 %
5 VIAL (ML) INJECTION PRN
Status: CANCELLED | OUTPATIENT
Start: 2020-01-06

## 2020-01-05 RX ADMIN — CEFTRIAXONE SODIUM 2 G: 2 INJECTION, POWDER, FOR SOLUTION INTRAMUSCULAR; INTRAVENOUS at 17:12

## 2020-01-05 RX ADMIN — DAPTOMYCIN 880 MG: 500 INJECTION, POWDER, LYOPHILIZED, FOR SOLUTION INTRAVENOUS at 17:12

## 2020-01-05 NOTE — PROGRESS NOTES
Patient seen today for IVAB therapy.  Plan of care discussed.  Assessment completed.  Good blood return from PICC line.  Daptomycin and Rocephin infused as ordered. PICC dressing changed completed using sterile technique. Tolerated well and without complaint.  Patient discharged after completion of treatment in good condition, ambulatory.  Returns daily.

## 2020-01-06 ENCOUNTER — OUTPATIENT INFUSION SERVICES (OUTPATIENT)
Dept: ONCOLOGY | Facility: MEDICAL CENTER | Age: 45
End: 2020-01-06
Attending: INTERNAL MEDICINE
Payer: COMMERCIAL

## 2020-01-06 VITALS
BODY MASS INDEX: 38.97 KG/M2 | RESPIRATION RATE: 18 BRPM | WEIGHT: 242.51 LBS | TEMPERATURE: 98 F | SYSTOLIC BLOOD PRESSURE: 134 MMHG | OXYGEN SATURATION: 99 % | HEART RATE: 93 BPM | HEIGHT: 66 IN | DIASTOLIC BLOOD PRESSURE: 90 MMHG

## 2020-01-06 DIAGNOSIS — M86.9 OSTEOMYELITIS OF LEFT ANKLE, UNSPECIFIED TYPE (HCC): ICD-10-CM

## 2020-01-06 LAB
ALBUMIN SERPL BCP-MCNC: 3.5 G/DL (ref 3.2–4.9)
ALBUMIN/GLOB SERPL: 1.1 G/DL
ALP SERPL-CCNC: 51 U/L (ref 30–99)
ALT SERPL-CCNC: 8 U/L (ref 2–50)
ANION GAP SERPL CALC-SCNC: 8 MMOL/L (ref 0–11.9)
AST SERPL-CCNC: 14 U/L (ref 12–45)
BASOPHILS # BLD AUTO: 0.4 % (ref 0–1.8)
BASOPHILS # BLD: 0.02 K/UL (ref 0–0.12)
BILIRUB SERPL-MCNC: 0.3 MG/DL (ref 0.1–1.5)
BUN SERPL-MCNC: 15 MG/DL (ref 8–22)
CALCIUM SERPL-MCNC: 8.8 MG/DL (ref 8.5–10.5)
CHLORIDE SERPL-SCNC: 108 MMOL/L (ref 96–112)
CK SERPL-CCNC: 99 U/L (ref 0–154)
CO2 SERPL-SCNC: 24 MMOL/L (ref 20–33)
CREAT SERPL-MCNC: 0.77 MG/DL (ref 0.5–1.4)
EOSINOPHIL # BLD AUTO: 0.2 K/UL (ref 0–0.51)
EOSINOPHIL NFR BLD: 3.9 % (ref 0–6.9)
ERYTHROCYTE [DISTWIDTH] IN BLOOD BY AUTOMATED COUNT: 49.6 FL (ref 35.9–50)
GLOBULIN SER CALC-MCNC: 3.2 G/DL (ref 1.9–3.5)
GLUCOSE SERPL-MCNC: 90 MG/DL (ref 65–99)
HCT VFR BLD AUTO: 31.3 % (ref 37–47)
HGB BLD-MCNC: 9.8 G/DL (ref 12–16)
IMM GRANULOCYTES # BLD AUTO: 0.01 K/UL (ref 0–0.11)
IMM GRANULOCYTES NFR BLD AUTO: 0.2 % (ref 0–0.9)
LYMPHOCYTES # BLD AUTO: 1.92 K/UL (ref 1–4.8)
LYMPHOCYTES NFR BLD: 37.2 % (ref 22–41)
MCH RBC QN AUTO: 25.3 PG (ref 27–33)
MCHC RBC AUTO-ENTMCNC: 31.3 G/DL (ref 33.6–35)
MCV RBC AUTO: 80.9 FL (ref 81.4–97.8)
MONOCYTES # BLD AUTO: 0.39 K/UL (ref 0–0.85)
MONOCYTES NFR BLD AUTO: 7.6 % (ref 0–13.4)
NEUTROPHILS # BLD AUTO: 2.62 K/UL (ref 2–7.15)
NEUTROPHILS NFR BLD: 50.7 % (ref 44–72)
NRBC # BLD AUTO: 0 K/UL
NRBC BLD-RTO: 0 /100 WBC
PLATELET # BLD AUTO: 262 K/UL (ref 164–446)
PMV BLD AUTO: 9.1 FL (ref 9–12.9)
POTASSIUM SERPL-SCNC: 3.9 MMOL/L (ref 3.6–5.5)
PROT SERPL-MCNC: 6.7 G/DL (ref 6–8.2)
RBC # BLD AUTO: 3.87 M/UL (ref 4.2–5.4)
SODIUM SERPL-SCNC: 140 MMOL/L (ref 135–145)
WBC # BLD AUTO: 5.2 K/UL (ref 4.8–10.8)

## 2020-01-06 PROCEDURE — 36592 COLLECT BLOOD FROM PICC: CPT

## 2020-01-06 PROCEDURE — 85025 COMPLETE CBC W/AUTO DIFF WBC: CPT

## 2020-01-06 PROCEDURE — 80053 COMPREHEN METABOLIC PANEL: CPT

## 2020-01-06 PROCEDURE — 96368 THER/DIAG CONCURRENT INF: CPT

## 2020-01-06 PROCEDURE — 700111 HCHG RX REV CODE 636 W/ 250 OVERRIDE (IP): Performed by: INTERNAL MEDICINE

## 2020-01-06 PROCEDURE — 82550 ASSAY OF CK (CPK): CPT

## 2020-01-06 PROCEDURE — 96365 THER/PROPH/DIAG IV INF INIT: CPT

## 2020-01-06 PROCEDURE — 700105 HCHG RX REV CODE 258: Performed by: INTERNAL MEDICINE

## 2020-01-06 RX ORDER — 0.9 % SODIUM CHLORIDE 0.9 %
5 VIAL (ML) INJECTION PRN
Status: CANCELLED | OUTPATIENT
Start: 2020-01-07

## 2020-01-06 RX ORDER — 0.9 % SODIUM CHLORIDE 0.9 %
10-20 VIAL (ML) INJECTION PRN
Status: CANCELLED | OUTPATIENT
Start: 2020-01-07

## 2020-01-06 RX ADMIN — DAPTOMYCIN 880 MG: 500 INJECTION, POWDER, LYOPHILIZED, FOR SOLUTION INTRAVENOUS at 17:25

## 2020-01-06 RX ADMIN — CEFTRIAXONE SODIUM 2 G: 2 INJECTION, POWDER, FOR SOLUTION INTRAMUSCULAR; INTRAVENOUS at 17:25

## 2020-01-06 NOTE — PROGRESS NOTES
Pt arrived ambulatory to [facility] for IV antibiotics. Pt was over an hour late for her appointment. Pt counseled on the importance of receiving treatment, and arriving on time for her appointments. Copy of appointment schedule given to patient.   SCOTT PICC flushed easily, [place]ka briskly. Pt received Daptomycin and Rocephin as ordered. Tolerated well. PICC flushed with NS per protocol. Pt left [facility] in NAD.

## 2020-01-07 ENCOUNTER — OUTPATIENT INFUSION SERVICES (OUTPATIENT)
Dept: ONCOLOGY | Facility: MEDICAL CENTER | Age: 45
End: 2020-01-07
Attending: INTERNAL MEDICINE
Payer: COMMERCIAL

## 2020-01-07 VITALS
HEART RATE: 88 BPM | TEMPERATURE: 98 F | RESPIRATION RATE: 18 BRPM | OXYGEN SATURATION: 96 % | DIASTOLIC BLOOD PRESSURE: 91 MMHG | SYSTOLIC BLOOD PRESSURE: 136 MMHG

## 2020-01-07 DIAGNOSIS — M86.9 OSTEOMYELITIS OF LEFT ANKLE, UNSPECIFIED TYPE (HCC): ICD-10-CM

## 2020-01-07 PROCEDURE — 96368 THER/DIAG CONCURRENT INF: CPT

## 2020-01-07 PROCEDURE — 700105 HCHG RX REV CODE 258: Performed by: INTERNAL MEDICINE

## 2020-01-07 PROCEDURE — 700111 HCHG RX REV CODE 636 W/ 250 OVERRIDE (IP): Performed by: INTERNAL MEDICINE

## 2020-01-07 PROCEDURE — 96365 THER/PROPH/DIAG IV INF INIT: CPT

## 2020-01-07 RX ORDER — 0.9 % SODIUM CHLORIDE 0.9 %
10-20 VIAL (ML) INJECTION PRN
Status: CANCELLED | OUTPATIENT
Start: 2020-01-08

## 2020-01-07 RX ORDER — 0.9 % SODIUM CHLORIDE 0.9 %
5 VIAL (ML) INJECTION PRN
Status: CANCELLED | OUTPATIENT
Start: 2020-01-08

## 2020-01-07 RX ADMIN — DAPTOMYCIN 880 MG: 500 INJECTION, POWDER, LYOPHILIZED, FOR SOLUTION INTRAVENOUS at 17:03

## 2020-01-07 RX ADMIN — CEFTRIAXONE SODIUM 2 G: 2 INJECTION, POWDER, FOR SOLUTION INTRAMUSCULAR; INTRAVENOUS at 17:05

## 2020-01-07 NOTE — PROGRESS NOTES
Pt arrived ambulatory to [facility] for antibiotic infusion. PICC line with brisk blood return, Monday labs drawn and sent to lab. Pt medicated per MAR. Pt tolerated treatment without s/s adverse reaction. Pt discharged to self care, NAD. Pt to return tomorrow.

## 2020-01-08 ENCOUNTER — OUTPATIENT INFUSION SERVICES (OUTPATIENT)
Dept: ONCOLOGY | Facility: MEDICAL CENTER | Age: 45
End: 2020-01-08
Attending: INTERNAL MEDICINE
Payer: COMMERCIAL

## 2020-01-08 VITALS
DIASTOLIC BLOOD PRESSURE: 90 MMHG | RESPIRATION RATE: 18 BRPM | SYSTOLIC BLOOD PRESSURE: 130 MMHG | OXYGEN SATURATION: 96 % | TEMPERATURE: 97.7 F | HEART RATE: 75 BPM

## 2020-01-08 DIAGNOSIS — M86.9 OSTEOMYELITIS OF LEFT ANKLE, UNSPECIFIED TYPE (HCC): ICD-10-CM

## 2020-01-08 PROCEDURE — 96365 THER/PROPH/DIAG IV INF INIT: CPT

## 2020-01-08 PROCEDURE — 700105 HCHG RX REV CODE 258: Performed by: INTERNAL MEDICINE

## 2020-01-08 PROCEDURE — 96368 THER/DIAG CONCURRENT INF: CPT

## 2020-01-08 PROCEDURE — 700111 HCHG RX REV CODE 636 W/ 250 OVERRIDE (IP): Performed by: INTERNAL MEDICINE

## 2020-01-08 RX ORDER — 0.9 % SODIUM CHLORIDE 0.9 %
10-20 VIAL (ML) INJECTION PRN
Status: CANCELLED | OUTPATIENT
Start: 2020-01-09

## 2020-01-08 RX ORDER — 0.9 % SODIUM CHLORIDE 0.9 %
5 VIAL (ML) INJECTION PRN
Status: CANCELLED | OUTPATIENT
Start: 2020-01-09

## 2020-01-08 RX ADMIN — DAPTOMYCIN 880 MG: 500 INJECTION, POWDER, LYOPHILIZED, FOR SOLUTION INTRAVENOUS at 17:13

## 2020-01-08 RX ADMIN — CEFTRIAXONE SODIUM 2 G: 2 INJECTION, POWDER, FOR SOLUTION INTRAMUSCULAR; INTRAVENOUS at 17:13

## 2020-01-08 ASSESSMENT — PAIN SCALES - GENERAL: PAINLEVEL: NO PAIN

## 2020-01-08 NOTE — DOCUMENTATION QUERY
[facility]                                                                       Query Response Note      PATIENT:               GABE AL  ACCT #:                  [id number]  MRN:                     [id number]  :                      1975  ADMIT DATE:       2019 7:50 PM  DISCH DATE:        2019 1:47 PM  RESPONDING  PROVIDER #:        [id number]           QUERY TEXT:    Osteomyelitis is documented throughout the chart however, no acuity was specified and it is not mentioned in the DC Summary.     Please clarify status of this condition:    NOTE:  If an appropriate response is not listed below, please respond with a new note.           The patient's Clinical Indicators include:  Hardware infection of left ankle  Left lower leg cellulitis  IV Antibiotics    Patient with acute swelling, pain and erythema. Patient went to the OR on 19 with left hardware removal, I&D of skin, subcutaneous tissue, muscle and bone  Options provided:   -- Acute Osteomyelitis is ruled in   -- Chronic Osteomyelitis is ruled in   -- Acute on Chronic Osteomyelitis is ruled in   -- Osteomyelitis is ruled out   -- Unable to determine      Query created by: Sawallisch, Beth on 1/3/2020 9:36 AM    RESPONSE TEXT:    Unable to determine          Electronically signed by:  JOCELYN KOWALSKI MD 2020 7:32 AM

## 2020-01-08 NOTE — PROGRESS NOTES
Pt arrived ambulatory to [facility] for antibiotic infusion. PICC line with brisk blood return. Pt medicated per MAR. Pt tolerated treatment without s/s adverse reaction. PICC line flushed with NS, gauze and mesh sleeve applied for protection. Pt discharged to self care, [facility]. Pt to return tomorrow.

## 2020-01-09 ENCOUNTER — OUTPATIENT INFUSION SERVICES (OUTPATIENT)
Dept: ONCOLOGY | Facility: MEDICAL CENTER | Age: 45
End: 2020-01-09
Attending: INTERNAL MEDICINE
Payer: COMMERCIAL

## 2020-01-09 VITALS
DIASTOLIC BLOOD PRESSURE: 92 MMHG | HEART RATE: 87 BPM | OXYGEN SATURATION: 100 % | WEIGHT: 242.51 LBS | RESPIRATION RATE: 18 BRPM | SYSTOLIC BLOOD PRESSURE: 133 MMHG | BODY MASS INDEX: 38.97 KG/M2 | TEMPERATURE: 98.5 F

## 2020-01-09 DIAGNOSIS — M86.9 OSTEOMYELITIS OF LEFT ANKLE, UNSPECIFIED TYPE (HCC): ICD-10-CM

## 2020-01-09 PROCEDURE — 96368 THER/DIAG CONCURRENT INF: CPT

## 2020-01-09 PROCEDURE — 700105 HCHG RX REV CODE 258: Performed by: INTERNAL MEDICINE

## 2020-01-09 PROCEDURE — 96365 THER/PROPH/DIAG IV INF INIT: CPT

## 2020-01-09 PROCEDURE — 700111 HCHG RX REV CODE 636 W/ 250 OVERRIDE (IP): Performed by: INTERNAL MEDICINE

## 2020-01-09 RX ORDER — LISINOPRIL AND HYDROCHLOROTHIAZIDE 12.5; 1 MG/1; MG/1
1 TABLET ORAL
COMMUNITY
Start: 2019-12-27

## 2020-01-09 RX ORDER — 0.9 % SODIUM CHLORIDE 0.9 %
10-20 VIAL (ML) INJECTION PRN
Status: CANCELLED | OUTPATIENT
Start: 2020-01-10

## 2020-01-09 RX ORDER — 0.9 % SODIUM CHLORIDE 0.9 %
5 VIAL (ML) INJECTION PRN
Status: CANCELLED | OUTPATIENT
Start: 2020-01-10

## 2020-01-09 RX ORDER — FLUCONAZOLE 150 MG/1
TABLET ORAL
COMMUNITY
Start: 2020-01-07

## 2020-01-09 RX ADMIN — CEFTRIAXONE SODIUM 2 G: 2 INJECTION, POWDER, FOR SOLUTION INTRAMUSCULAR; INTRAVENOUS at 17:02

## 2020-01-09 RX ADMIN — DAPTOMYCIN 880 MG: 500 INJECTION, POWDER, LYOPHILIZED, FOR SOLUTION INTRAVENOUS at 17:02

## 2020-01-09 NOTE — PROGRESS NOTES
Annalise returns for IVABX. Daptomycin/Rocephin infused as ordered. Annalise tolerated well and without incident. PICC line in good condition and has positive blood return. PICC line flushed with saline per protocol. Next appointment scheduled. Discharged to self care; no apparent distress noted.

## 2020-01-10 ENCOUNTER — OUTPATIENT INFUSION SERVICES (OUTPATIENT)
Dept: ONCOLOGY | Facility: MEDICAL CENTER | Age: 45
End: 2020-01-10
Attending: INTERNAL MEDICINE
Payer: COMMERCIAL

## 2020-01-10 VITALS
RESPIRATION RATE: 18 BRPM | OXYGEN SATURATION: 100 % | DIASTOLIC BLOOD PRESSURE: 89 MMHG | HEART RATE: 92 BPM | SYSTOLIC BLOOD PRESSURE: 126 MMHG | TEMPERATURE: 97.9 F

## 2020-01-10 DIAGNOSIS — M86.9 OSTEOMYELITIS OF LEFT ANKLE, UNSPECIFIED TYPE (HCC): ICD-10-CM

## 2020-01-10 PROCEDURE — 700105 HCHG RX REV CODE 258: Performed by: INTERNAL MEDICINE

## 2020-01-10 PROCEDURE — 96365 THER/PROPH/DIAG IV INF INIT: CPT

## 2020-01-10 PROCEDURE — 700111 HCHG RX REV CODE 636 W/ 250 OVERRIDE (IP): Performed by: INTERNAL MEDICINE

## 2020-01-10 PROCEDURE — 96368 THER/DIAG CONCURRENT INF: CPT

## 2020-01-10 RX ORDER — 0.9 % SODIUM CHLORIDE 0.9 %
10-20 VIAL (ML) INJECTION PRN
Status: CANCELLED | OUTPATIENT
Start: 2020-01-11

## 2020-01-10 RX ORDER — 0.9 % SODIUM CHLORIDE 0.9 %
5 VIAL (ML) INJECTION PRN
Status: CANCELLED | OUTPATIENT
Start: 2020-01-11

## 2020-01-10 RX ADMIN — CEFTRIAXONE SODIUM 2 G: 2 INJECTION, POWDER, FOR SOLUTION INTRAMUSCULAR; INTRAVENOUS at 17:25

## 2020-01-10 RX ADMIN — DAPTOMYCIN 880 MG: 500 INJECTION, POWDER, LYOPHILIZED, FOR SOLUTION INTRAVENOUS at 17:25

## 2020-01-10 NOTE — PROGRESS NOTES
Pt returns for cont daily IV abx. R PICC in place. Line flushed and patent, brisk blood return observed. Both abx infused per MAR, pt araseli well. Line flushed clear. PICC flushed and site wrapped. Pt knows to cont to return daily. Discharged home under self care in no apparent distress.

## 2020-01-11 ENCOUNTER — OUTPATIENT INFUSION SERVICES (OUTPATIENT)
Dept: ONCOLOGY | Facility: MEDICAL CENTER | Age: 45
End: 2020-01-11
Attending: INTERNAL MEDICINE
Payer: COMMERCIAL

## 2020-01-11 VITALS
RESPIRATION RATE: 18 BRPM | TEMPERATURE: 96.9 F | DIASTOLIC BLOOD PRESSURE: 91 MMHG | HEART RATE: 93 BPM | SYSTOLIC BLOOD PRESSURE: 141 MMHG | OXYGEN SATURATION: 99 %

## 2020-01-11 DIAGNOSIS — M86.9 OSTEOMYELITIS OF LEFT ANKLE, UNSPECIFIED TYPE (HCC): ICD-10-CM

## 2020-01-11 PROCEDURE — 700105 HCHG RX REV CODE 258: Performed by: INTERNAL MEDICINE

## 2020-01-11 PROCEDURE — 700111 HCHG RX REV CODE 636 W/ 250 OVERRIDE (IP): Performed by: INTERNAL MEDICINE

## 2020-01-11 PROCEDURE — 96368 THER/DIAG CONCURRENT INF: CPT

## 2020-01-11 PROCEDURE — 96365 THER/PROPH/DIAG IV INF INIT: CPT

## 2020-01-11 RX ORDER — 0.9 % SODIUM CHLORIDE 0.9 %
10-20 VIAL (ML) INJECTION PRN
Status: CANCELLED | OUTPATIENT
Start: 2020-01-12

## 2020-01-11 RX ORDER — 0.9 % SODIUM CHLORIDE 0.9 %
5 VIAL (ML) INJECTION PRN
Status: CANCELLED | OUTPATIENT
Start: 2020-01-12

## 2020-01-11 RX ADMIN — CEFTRIAXONE SODIUM 2 G: 2 INJECTION, POWDER, FOR SOLUTION INTRAMUSCULAR; INTRAVENOUS at 17:28

## 2020-01-11 RX ADMIN — DAPTOMYCIN 880 MG: 500 INJECTION, POWDER, LYOPHILIZED, FOR SOLUTION INTRAVENOUS at 17:28

## 2020-01-11 NOTE — PROGRESS NOTES
Pt arrived ambulatory to [facility] for daily antibiotics.  SCOTT PICC flushed easily, w[place]ka briskly. Pt received Daptomycin and Rocephin as ordered. Tolerated well. PICC flushed with NS per policy. Pt left [facility] in NAD, aware of next appointment time.

## 2020-01-12 ENCOUNTER — OUTPATIENT INFUSION SERVICES (OUTPATIENT)
Dept: ONCOLOGY | Facility: MEDICAL CENTER | Age: 45
End: 2020-01-12
Attending: INTERNAL MEDICINE
Payer: COMMERCIAL

## 2020-01-12 VITALS
TEMPERATURE: 98.8 F | DIASTOLIC BLOOD PRESSURE: 90 MMHG | HEART RATE: 92 BPM | RESPIRATION RATE: 18 BRPM | SYSTOLIC BLOOD PRESSURE: 128 MMHG | OXYGEN SATURATION: 100 %

## 2020-01-12 DIAGNOSIS — M86.9 OSTEOMYELITIS OF LEFT ANKLE, UNSPECIFIED TYPE (HCC): ICD-10-CM

## 2020-01-12 PROCEDURE — 700111 HCHG RX REV CODE 636 W/ 250 OVERRIDE (IP): Performed by: INTERNAL MEDICINE

## 2020-01-12 PROCEDURE — 96368 THER/DIAG CONCURRENT INF: CPT

## 2020-01-12 PROCEDURE — 96365 THER/PROPH/DIAG IV INF INIT: CPT

## 2020-01-12 PROCEDURE — 700105 HCHG RX REV CODE 258: Performed by: INTERNAL MEDICINE

## 2020-01-12 RX ORDER — 0.9 % SODIUM CHLORIDE 0.9 %
10-20 VIAL (ML) INJECTION PRN
Status: CANCELLED | OUTPATIENT
Start: 2020-01-13

## 2020-01-12 RX ORDER — 0.9 % SODIUM CHLORIDE 0.9 %
5 VIAL (ML) INJECTION PRN
Status: CANCELLED | OUTPATIENT
Start: 2020-01-13

## 2020-01-12 RX ADMIN — DAPTOMYCIN 880 MG: 500 INJECTION, POWDER, LYOPHILIZED, FOR SOLUTION INTRAVENOUS at 17:33

## 2020-01-12 RX ADMIN — CEFTRIAXONE SODIUM 2 G: 2 INJECTION, POWDER, FOR SOLUTION INTRAMUSCULAR; INTRAVENOUS at 17:36

## 2020-01-12 NOTE — PROGRESS NOTES
Pt returns for cont daily IV abx. R PICC in place. Line flushed and patent, brisk blood return observed. Both abx infused per MAR, pt araseli well. PICC dressing changed in sterile field. Line flushed clear. PICC flushed and site wrapped. Pt knows to cont to return daily. Discharged home under self care in no apparent distress.

## 2020-01-13 ENCOUNTER — OUTPATIENT INFUSION SERVICES (OUTPATIENT)
Dept: ONCOLOGY | Facility: MEDICAL CENTER | Age: 45
End: 2020-01-13
Attending: INTERNAL MEDICINE
Payer: COMMERCIAL

## 2020-01-13 VITALS
TEMPERATURE: 97.6 F | HEIGHT: 67 IN | WEIGHT: 242.51 LBS | HEART RATE: 92 BPM | RESPIRATION RATE: 18 BRPM | DIASTOLIC BLOOD PRESSURE: 94 MMHG | BODY MASS INDEX: 38.06 KG/M2 | OXYGEN SATURATION: 100 % | SYSTOLIC BLOOD PRESSURE: 140 MMHG

## 2020-01-13 DIAGNOSIS — M86.9 OSTEOMYELITIS OF LEFT ANKLE, UNSPECIFIED TYPE (HCC): ICD-10-CM

## 2020-01-13 LAB
ALBUMIN SERPL BCP-MCNC: 3.7 G/DL (ref 3.2–4.9)
ALBUMIN/GLOB SERPL: 1.3 G/DL
ALP SERPL-CCNC: 49 U/L (ref 30–99)
ALT SERPL-CCNC: 8 U/L (ref 2–50)
ANION GAP SERPL CALC-SCNC: 10 MMOL/L (ref 0–11.9)
AST SERPL-CCNC: 14 U/L (ref 12–45)
BASOPHILS # BLD AUTO: 0.2 % (ref 0–1.8)
BASOPHILS # BLD: 0.01 K/UL (ref 0–0.12)
BILIRUB SERPL-MCNC: 0.3 MG/DL (ref 0.1–1.5)
BUN SERPL-MCNC: 14 MG/DL (ref 8–22)
CALCIUM SERPL-MCNC: 8.6 MG/DL (ref 8.5–10.5)
CHLORIDE SERPL-SCNC: 104 MMOL/L (ref 96–112)
CK SERPL-CCNC: 85 U/L (ref 0–154)
CO2 SERPL-SCNC: 23 MMOL/L (ref 20–33)
CREAT SERPL-MCNC: 0.93 MG/DL (ref 0.5–1.4)
CRP SERPL HS-MCNC: 0.25 MG/DL (ref 0–0.75)
EOSINOPHIL # BLD AUTO: 0.14 K/UL (ref 0–0.51)
EOSINOPHIL NFR BLD: 2.6 % (ref 0–6.9)
ERYTHROCYTE [DISTWIDTH] IN BLOOD BY AUTOMATED COUNT: 51.1 FL (ref 35.9–50)
ERYTHROCYTE [SEDIMENTATION RATE] IN BLOOD BY WESTERGREN METHOD: 14 MM/HOUR (ref 0–20)
GLOBULIN SER CALC-MCNC: 2.8 G/DL (ref 1.9–3.5)
GLUCOSE SERPL-MCNC: 154 MG/DL (ref 65–99)
HCT VFR BLD AUTO: 31.1 % (ref 37–47)
HGB BLD-MCNC: 9.4 G/DL (ref 12–16)
IMM GRANULOCYTES # BLD AUTO: 0.01 K/UL (ref 0–0.11)
IMM GRANULOCYTES NFR BLD AUTO: 0.2 % (ref 0–0.9)
LYMPHOCYTES # BLD AUTO: 1.92 K/UL (ref 1–4.8)
LYMPHOCYTES NFR BLD: 36.2 % (ref 22–41)
MCH RBC QN AUTO: 24.9 PG (ref 27–33)
MCHC RBC AUTO-ENTMCNC: 30.2 G/DL (ref 33.6–35)
MCV RBC AUTO: 82.5 FL (ref 81.4–97.8)
MONOCYTES # BLD AUTO: 0.31 K/UL (ref 0–0.85)
MONOCYTES NFR BLD AUTO: 5.8 % (ref 0–13.4)
NEUTROPHILS # BLD AUTO: 2.91 K/UL (ref 2–7.15)
NEUTROPHILS NFR BLD: 55 % (ref 44–72)
NRBC # BLD AUTO: 0 K/UL
NRBC BLD-RTO: 0 /100 WBC
PLATELET # BLD AUTO: 229 K/UL (ref 164–446)
PMV BLD AUTO: 9.1 FL (ref 9–12.9)
POTASSIUM SERPL-SCNC: 3.2 MMOL/L (ref 3.6–5.5)
PROT SERPL-MCNC: 6.5 G/DL (ref 6–8.2)
RBC # BLD AUTO: 3.77 M/UL (ref 4.2–5.4)
SODIUM SERPL-SCNC: 137 MMOL/L (ref 135–145)
WBC # BLD AUTO: 5.3 K/UL (ref 4.8–10.8)

## 2020-01-13 PROCEDURE — 700111 HCHG RX REV CODE 636 W/ 250 OVERRIDE (IP): Performed by: INTERNAL MEDICINE

## 2020-01-13 PROCEDURE — 96368 THER/DIAG CONCURRENT INF: CPT

## 2020-01-13 PROCEDURE — 36592 COLLECT BLOOD FROM PICC: CPT

## 2020-01-13 PROCEDURE — 86140 C-REACTIVE PROTEIN: CPT

## 2020-01-13 PROCEDURE — 82550 ASSAY OF CK (CPK): CPT

## 2020-01-13 PROCEDURE — 700105 HCHG RX REV CODE 258: Performed by: INTERNAL MEDICINE

## 2020-01-13 PROCEDURE — 85025 COMPLETE CBC W/AUTO DIFF WBC: CPT

## 2020-01-13 PROCEDURE — 85652 RBC SED RATE AUTOMATED: CPT

## 2020-01-13 PROCEDURE — 96365 THER/PROPH/DIAG IV INF INIT: CPT

## 2020-01-13 PROCEDURE — 80053 COMPREHEN METABOLIC PANEL: CPT

## 2020-01-13 RX ORDER — 0.9 % SODIUM CHLORIDE 0.9 %
5 VIAL (ML) INJECTION PRN
Status: CANCELLED | OUTPATIENT
Start: 2020-01-14

## 2020-01-13 RX ORDER — 0.9 % SODIUM CHLORIDE 0.9 %
10-20 VIAL (ML) INJECTION PRN
Status: CANCELLED | OUTPATIENT
Start: 2020-01-14

## 2020-01-13 RX ADMIN — CEFTRIAXONE SODIUM 2 G: 2 INJECTION, POWDER, FOR SOLUTION INTRAMUSCULAR; INTRAVENOUS at 17:27

## 2020-01-13 RX ADMIN — DAPTOMYCIN 880 MG: 500 INJECTION, POWDER, LYOPHILIZED, FOR SOLUTION INTRAVENOUS at 17:26

## 2020-01-13 NOTE — PROGRESS NOTES
Patient arrived ambulatory to the [facility] for Daptomycin/Rocephin. Reviewed vital signs, labs, and physician order. Patient arrives with SL PICC to [facility], visualized brisk blood return. Daptomycin/Rocephin administered, no adverse reaction observed. PICC flushed per protocol, 4X4 gauze, and mesh sleeve placed for protection.  Confirmed upcoming appointment date and time with patient. Patient left the [facility] ambulatory in no sign of distress.

## 2020-01-14 ENCOUNTER — OUTPATIENT INFUSION SERVICES (OUTPATIENT)
Dept: ONCOLOGY | Facility: MEDICAL CENTER | Age: 45
End: 2020-01-14
Attending: INTERNAL MEDICINE
Payer: COMMERCIAL

## 2020-01-14 VITALS
DIASTOLIC BLOOD PRESSURE: 92 MMHG | SYSTOLIC BLOOD PRESSURE: 135 MMHG | OXYGEN SATURATION: 98 % | RESPIRATION RATE: 18 BRPM | HEART RATE: 94 BPM | TEMPERATURE: 97.6 F

## 2020-01-14 DIAGNOSIS — M86.9 OSTEOMYELITIS OF LEFT ANKLE, UNSPECIFIED TYPE (HCC): ICD-10-CM

## 2020-01-14 PROCEDURE — 700111 HCHG RX REV CODE 636 W/ 250 OVERRIDE (IP): Performed by: INTERNAL MEDICINE

## 2020-01-14 PROCEDURE — 96368 THER/DIAG CONCURRENT INF: CPT

## 2020-01-14 PROCEDURE — 700105 HCHG RX REV CODE 258: Performed by: INTERNAL MEDICINE

## 2020-01-14 PROCEDURE — 96365 THER/PROPH/DIAG IV INF INIT: CPT

## 2020-01-14 RX ORDER — 0.9 % SODIUM CHLORIDE 0.9 %
10-20 VIAL (ML) INJECTION PRN
Status: CANCELLED | OUTPATIENT
Start: 2020-01-15

## 2020-01-14 RX ORDER — 0.9 % SODIUM CHLORIDE 0.9 %
5 VIAL (ML) INJECTION PRN
Status: CANCELLED | OUTPATIENT
Start: 2020-01-15

## 2020-01-14 RX ADMIN — CEFTRIAXONE SODIUM 2 G: 2 INJECTION, POWDER, FOR SOLUTION INTRAMUSCULAR; INTRAVENOUS at 17:18

## 2020-01-14 RX ADMIN — DAPTOMYCIN 880 MG: 500 INJECTION, POWDER, LYOPHILIZED, FOR SOLUTION INTRAVENOUS at 17:17

## 2020-01-14 NOTE — PROGRESS NOTES
Pt arrived ambulatory to [facility] for antibiotic treatment. PICC with brisk blood return noted, Monday labs drawn. Pt medicated per MAR. Pt tolerated treatment with s/s adverse reaction. Pt discharged to self care, NAD. Pt to return tomorrow.

## 2020-01-15 ENCOUNTER — OFFICE VISIT (OUTPATIENT)
Dept: INFECTIOUS DISEASES | Facility: MEDICAL CENTER | Age: 45
End: 2020-01-15
Payer: COMMERCIAL

## 2020-01-15 ENCOUNTER — OUTPATIENT INFUSION SERVICES (OUTPATIENT)
Dept: ONCOLOGY | Facility: MEDICAL CENTER | Age: 45
End: 2020-01-15
Attending: INTERNAL MEDICINE
Payer: COMMERCIAL

## 2020-01-15 VITALS
DIASTOLIC BLOOD PRESSURE: 90 MMHG | HEART RATE: 92 BPM | SYSTOLIC BLOOD PRESSURE: 133 MMHG | RESPIRATION RATE: 18 BRPM | TEMPERATURE: 98 F | OXYGEN SATURATION: 96 %

## 2020-01-15 VITALS
TEMPERATURE: 98.1 F | DIASTOLIC BLOOD PRESSURE: 90 MMHG | SYSTOLIC BLOOD PRESSURE: 140 MMHG | WEIGHT: 241.8 LBS | OXYGEN SATURATION: 99 % | BODY MASS INDEX: 37.95 KG/M2 | HEART RATE: 82 BPM | HEIGHT: 67 IN

## 2020-01-15 DIAGNOSIS — M86.9 OSTEOMYELITIS OF LEFT ANKLE, UNSPECIFIED TYPE (HCC): ICD-10-CM

## 2020-01-15 PROCEDURE — 96365 THER/PROPH/DIAG IV INF INIT: CPT

## 2020-01-15 PROCEDURE — 99212 OFFICE O/P EST SF 10 MIN: CPT | Performed by: INTERNAL MEDICINE

## 2020-01-15 PROCEDURE — 700111 HCHG RX REV CODE 636 W/ 250 OVERRIDE (IP): Performed by: INTERNAL MEDICINE

## 2020-01-15 PROCEDURE — 96368 THER/DIAG CONCURRENT INF: CPT

## 2020-01-15 PROCEDURE — 700105 HCHG RX REV CODE 258: Performed by: INTERNAL MEDICINE

## 2020-01-15 PROCEDURE — 306780 HCHG STAT FOR TRANSFUSION PER CASE

## 2020-01-15 RX ORDER — 0.9 % SODIUM CHLORIDE 0.9 %
10-20 VIAL (ML) INJECTION PRN
Status: CANCELLED | OUTPATIENT
Start: 2020-01-20

## 2020-01-15 RX ORDER — 0.9 % SODIUM CHLORIDE 0.9 %
5 VIAL (ML) INJECTION PRN
Status: CANCELLED | OUTPATIENT
Start: 2020-01-20

## 2020-01-15 RX ADMIN — DAPTOMYCIN 880 MG: 500 INJECTION, POWDER, LYOPHILIZED, FOR SOLUTION INTRAVENOUS at 17:22

## 2020-01-15 RX ADMIN — CEFTRIAXONE SODIUM 2 G: 2 INJECTION, POWDER, FOR SOLUTION INTRAMUSCULAR; INTRAVENOUS at 17:22

## 2020-01-15 NOTE — PROGRESS NOTES
Pt arrived ambulatory to [facility] for antibiotic infusion. SCOTT PICC with brisk blood return noted. Pt medicated per MAR. Pt tolerated treatment without s/s adverse reaction. Pt discharged to self care, NAD. Pt to return tomorrow for final treatment.

## 2020-01-15 NOTE — PROGRESS NOTES
[facility] INFECTIOUS DISEASES CLINIC FOLLOW-UP NOTE     Date of Service: 1/15/2020    Chief Complaint: Follow-up for left ankle osteomyelitis with underlying hardware    History of Present Illness:     Annalise Woodard is a 44 y.o. woman with a history of rheumatoid arthritis and SLE on Plaquenil and methotrexate, and a history of a left ankle fracture in April status post ORIF followed by one screw removal in August 2019 admitted for left ankle swelling, erythema and worsening pain secondary to infection, underwent hardware removal and I&D down to bone on 12/4/2019 with negative cultures.  Patient was discharged with plan to continue 6 weeks of IV daptomycin and ceftriaxone through 1/15/2019.  Patient tolerated antibiotics without any significant events.  Her inflammatory markers have trended back down and are now normal.  She reports no episodes of fevers or chills, her ankle incision has healed well.    Review of Systems:  All other systems reviewed and are negative expect as noted in HPI    Past Medical History:   Diagnosis Date   • ASTHMA    • Hypertension    • Lupus (HCC)    • Rheumatoid arthritis (HCC)        Past Surgical History:   Procedure Laterality Date   • HARDWARE REMOVAL ORTHO Left 12/4/2019    Procedure: HARDWARE REMOVAL ORTHO- ANKLE;  Surgeon: Kali Pitts M.D.;  Location: [facility];  Service: Orthopedics   • IRRIGATION & DEBRIDEMENT ORTHO Left 12/4/2019    Procedure: IRRIGATION AND DEBRIDEMENT, WOUND;  Surgeon: Kali Pitts M.D.;  Location: [facility];  Service: Orthopedics   • ANKLE ORIF Left 4/8/2019    Procedure: ORIF, ANKLE;  Surgeon: Gildardo Padron M.D.;  Location: [facility];  Service: Orthopedics       Family History   Problem Relation Age of Onset   • Heart Attack Mother        Social History     Socioeconomic History   • Marital status: Single     Spouse name: Not on file   • Number of children: Not on file   • Years of education:  Not on file   • Highest education level: Not on file   Occupational History   • Not on file   Social Needs   • Financial resource strain: Not on file   • Food insecurity:     Worry: Not on file     Inability: Not on file   • Transportation needs:     Medical: Not on file     Non-medical: Not on file   Tobacco Use   • Smoking status: Never Smoker   • Smokeless tobacco: Never Used   Substance and Sexual Activity   • Alcohol use: Yes     Frequency: 2-4 times a month     Drinks per session: 1 or 2     Binge frequency: Never   • Drug use: No   • Sexual activity: Not on file   Lifestyle   • Physical activity:     Days per week: Not on file     Minutes per session: Not on file   • Stress: Not on file   Relationships   • Social connections:     Talks on phone: Not on file     Gets together: Not on file     Attends Christianity service: Not on file     Active member of club or organization: Not on file     Attends meetings of clubs or organizations: Not on file     Relationship status: Not on file   • Intimate partner violence:     Fear of current or ex partner: Not on file     Emotionally abused: Not on file     Physically abused: Not on file     Forced sexual activity: Not on file   Other Topics Concern   • Not on file   Social History Narrative   • Not on file       No Known Allergies    Medications:  Current Outpatient Medications on File Prior to Visit   Medication Sig Dispense Refill   • DAPTOMYCIN IV by Intravenous route.     • CEFTRIAXONE SODIUM IV by Intravenous route.     • lisinopril-hydrochlorothiazide (PRINZIDE) 10-12.5 MG per tablet Take 1 Tab by mouth.  FOR BLOOD PRESSURE     • nystatin/triamcinolone (MYCOLOG) 645060-8.1 UNIT/GM-% Cream APPLY 1 APPLICATION TOPICALLY TO AFFECTED AREA TWICE DAILY FOR 14 DAYS     • hydroxychloroquine (PLAQUENIL) 200 MG Tab Take 200 mg by mouth 2 Times a Day.  3   • vitamin D (CHOLECALCIFEROL) 1000 UNIT Tab Take 1,000 Units by mouth every day.     • folic acid (FOLVITE) 1 MG Tab Take  "1 mg by mouth every day.     • methotrexate 2.5 MG Tab Take 10 mg by mouth every Monday. On Monday      • fluconazole (DIFLUCAN) 150 MG tablet TAKE 1 TABLET BY MOUTH EVERY 3 DAYS       No current facility-administered medications on file prior to visit.        Physical Exam:   Vital Signs: /90 (BP Location: Left arm, Patient Position: Sitting, BP Cuff Size: Adult)   Pulse 82   Temp 36.7 °C (98.1 °F) (Temporal)   Ht 1.71 m (5' 7.32\")   Wt 109.7 kg (241 lb 12.8 oz)   SpO2 99%   BMI 37.51 kg/m²   Vital signs reviewed  Constitutional: Patient is oriented to person, place, and time. Appears well-developed and well-nourished. No distress  Head: Atraumatic, normocephalic  Eyes: Conjunctivae normal, Pupils are equal, round, and reactive to light.   Mouth/Throat: Lips without lesions, oropharynx is clear and moist.  Neck: Neck supple. No masses/lymphadenopathy  Cardiovascular: Normal rate, regular rhythm, normal S1S2 and intact distal pulses. No murmur, gallop, or friction rub. No pedal edema.  Pulmonary/Chest: No respiratory distress. Unlabored respiratory effort, lungs clear to auscultation. No wheezes or rales.   Abdominal: Soft, non tender. BS + x 4. No masses or hepatosplenomegaly.   Musculoskeletal: Left ankle incision well-healed, good range of motion, no TTP, no increased warmth, no discharge.  Right upper extremity PICC line in place  Neurological: Alert and oriented to person, place, and time. No gross cranial nerve deficit. No focal neural deficit noted  Skin: Skin is warm and dry. No rashes or embolic phenomena noted on exposed skin  Psychiatric: Normal mood and affect. Behavior is normal.     LABS:  WBC   Date/Time Value Ref Range Status   01/13/2020 05:11 PM 5.3 4.8 - 10.8 K/uL Final     RBC   Date/Time Value Ref Range Status   01/13/2020 05:11 PM 3.77 (L) 4.20 - 5.40 M/uL Final     Hemoglobin   Date/Time Value Ref Range Status   01/13/2020 05:11 PM 9.4 (L) 12.0 - 16.0 g/dL Final     Hematocrit "   Date/Time Value Ref Range Status   01/13/2020 05:11 PM 31.1 (L) 37.0 - 47.0 % Final     MCV   Date/Time Value Ref Range Status   01/13/2020 05:11 PM 82.5 81.4 - 97.8 fL Final     MCH   Date/Time Value Ref Range Status   01/13/2020 05:11 PM 24.9 (L) 27.0 - 33.0 pg Final     MCHC   Date/Time Value Ref Range Status   01/13/2020 05:11 PM 30.2 (L) 33.6 - 35.0 g/dL Final     MPV   Date/Time Value Ref Range Status   01/13/2020 05:11 PM 9.1 9.0 - 12.9 fL Final     Sodium   Date/Time Value Ref Range Status   01/13/2020 05:11  135 - 145 mmol/L Final     Potassium   Date/Time Value Ref Range Status   01/13/2020 05:11 PM 3.2 (L) 3.6 - 5.5 mmol/L Final     Chloride   Date/Time Value Ref Range Status   01/13/2020 05:11  96 - 112 mmol/L Final     Co2   Date/Time Value Ref Range Status   01/13/2020 05:11 PM 23 20 - 33 mmol/L Final     Glucose   Date/Time Value Ref Range Status   01/13/2020 05:11  (H) 65 - 99 mg/dL Final     Bun   Date/Time Value Ref Range Status   01/13/2020 05:11 PM 14 8 - 22 mg/dL Final     Creatinine   Date/Time Value Ref Range Status   01/13/2020 05:11 PM 0.93 0.50 - 1.40 mg/dL Final     Alkaline Phosphatase   Date/Time Value Ref Range Status   01/13/2020 05:11 PM 49 30 - 99 U/L Final     AST(SGOT)   Date/Time Value Ref Range Status   01/13/2020 05:11 PM 14 12 - 45 U/L Final     ALT(SGPT)   Date/Time Value Ref Range Status   01/13/2020 05:11 PM 8 2 - 50 U/L Final     Total Bilirubin   Date/Time Value Ref Range Status   01/13/2020 05:11 PM 0.3 0.1 - 1.5 mg/dL Final      CPK Total   Date/Time Value Ref Range Status   01/13/2020 05:11 PM 85 0 - 154 U/L Final        MICRO:  No results found for: BLOODCULTU, BLDCULT, BCHOLD      Latest pertinent labs were reviewed    IMAGING STUDIES:  Bone scan 12/2 with increased activity in the left ankle/distal tibia region    Assessment:   Annalise Woodard is a 44 y.o. female recently admitted with right ankle osteomyelitis with hardware infection, status  post removal with I&D down to bone on 12/4/2019, negative or cultures, completes 6 weeks of IV daptomycin and ceftriaxone today.  Doing well, incision is healed, no fevers or chills, inflammatory markers normalized.    Plan:   -Complete IV antibiotics today as planned and okay to discontinue the PICC line  -Patient counseled regarding watching out for recurrence of infection    ID clinic follow-up as needed    Eduar Martinez M.D.    Please note that this dictation was created using voice recognition software. I have worked with technical experts from [facility] to optimize the interface.  I have made every reasonable attempt to correct obvious errors, but there may be errors of grammar and possibly content that I did not discover before finalizing the note.

## 2020-01-16 NOTE — PROGRESS NOTES
Pt arrived ambulatory to [facility] for final doses of Rocephin and Daptomycin.  SCOTT PICC flushed easily, waleska briskly. Pt received antibiotics as ordered.  PICC flushed with NS, then d/c'd. Vaseline gauze dressing with Tegaderm applied to site. Pt tolerated removal well. Pt remained in [facility] for 30 minutes observation. Pt left [facility] in NAD.

## 2020-07-25 ENCOUNTER — OFFICE VISIT (OUTPATIENT)
Dept: URGENT CARE | Facility: PHYSICIAN GROUP | Age: 45
End: 2020-07-25
Payer: COMMERCIAL

## 2020-07-25 VITALS
SYSTOLIC BLOOD PRESSURE: 138 MMHG | HEART RATE: 78 BPM | DIASTOLIC BLOOD PRESSURE: 110 MMHG | WEIGHT: 238 LBS | TEMPERATURE: 98.6 F | RESPIRATION RATE: 18 BRPM | OXYGEN SATURATION: 100 % | HEIGHT: 68 IN | BODY MASS INDEX: 36.07 KG/M2

## 2020-07-25 DIAGNOSIS — K04.7 DENTAL INFECTION: ICD-10-CM

## 2020-07-25 PROCEDURE — 99203 OFFICE O/P NEW LOW 30 MIN: CPT | Performed by: NURSE PRACTITIONER

## 2020-07-25 RX ORDER — AMOXICILLIN 875 MG/1
875 TABLET, COATED ORAL 2 TIMES DAILY
Qty: 14 TAB | Refills: 0 | Status: SHIPPED | OUTPATIENT
Start: 2020-07-25 | End: 2020-08-01

## 2020-07-25 RX ORDER — IBUPROFEN 800 MG/1
800 TABLET ORAL EVERY 8 HOURS PRN
Qty: 90 TAB | Refills: 0 | Status: SHIPPED | OUTPATIENT
Start: 2020-07-25

## 2020-07-25 ASSESSMENT — ENCOUNTER SYMPTOMS
VOMITING: 0
CHILLS: 0
FEVER: 0
NAUSEA: 0
HEADACHES: 0
MYALGIAS: 0

## 2020-07-25 ASSESSMENT — FIBROSIS 4 INDEX: FIB4 SCORE: 0.93

## 2020-07-25 ASSESSMENT — PAIN SCALES - GENERAL: PAINLEVEL: 10=SEVERE PAIN

## 2020-07-25 NOTE — PROGRESS NOTES
Subjective:      Annalise Woodard is a 45 y.o. female who presents with Tooth Ache (L side tooth pain, x2 days)            HPI New. 45 year old female with left sided tooth pain for 2 days. States came on suddenly yesterday. She states located lower left side next to her tooth with cap. She denies fever, chills, myalgia, headache or nausea. She has been taking ibuprofen and tylenol for this. She has a dentist as well.   Patient has no known allergies.  Current Outpatient Medications on File Prior to Visit   Medication Sig Dispense Refill   • DAPTOMYCIN IV by Intravenous route.     • CEFTRIAXONE SODIUM IV by Intravenous route.     • fluconazole (DIFLUCAN) 150 MG tablet TAKE 1 TABLET BY MOUTH EVERY 3 DAYS     • lisinopril-hydrochlorothiazide (PRINZIDE) 10-12.5 MG per tablet Take 1 Tab by mouth.  FOR BLOOD PRESSURE     • nystatin/triamcinolone (MYCOLOG) 688375-6.1 UNIT/GM-% Cream APPLY 1 APPLICATION TOPICALLY TO AFFECTED AREA TWICE DAILY FOR 14 DAYS     • hydroxychloroquine (PLAQUENIL) 200 MG Tab Take 200 mg by mouth 2 Times a Day.  3   • vitamin D (CHOLECALCIFEROL) 1000 UNIT Tab Take 1,000 Units by mouth every day.     • folic acid (FOLVITE) 1 MG Tab Take 1 mg by mouth every day.     • methotrexate 2.5 MG Tab Take 10 mg by mouth every Monday. On Monday        No current facility-administered medications on file prior to visit.      Social History     Socioeconomic History   • Marital status: Single     Spouse name: Not on file   • Number of children: Not on file   • Years of education: Not on file   • Highest education level: Not on file   Occupational History   • Not on file   Social Needs   • Financial resource strain: Not on file   • Food insecurity     Worry: Not on file     Inability: Not on file   • Transportation needs     Medical: Not on file     Non-medical: Not on file   Tobacco Use   • Smoking status: Never Smoker   • Smokeless tobacco: Never Used   Substance and Sexual Activity   • Alcohol use: Not  "Currently     Frequency: 2-4 times a month     Drinks per session: 1 or 2     Binge frequency: Never   • Drug use: No   • Sexual activity: Not on file   Lifestyle   • Physical activity     Days per week: Not on file     Minutes per session: Not on file   • Stress: Not on file   Relationships   • Social connections     Talks on phone: Not on file     Gets together: Not on file     Attends Latter day service: Not on file     Active member of club or organization: Not on file     Attends meetings of clubs or organizations: Not on file     Relationship status: Not on file   • Intimate partner violence     Fear of current or ex partner: Not on file     Emotionally abused: Not on file     Physically abused: Not on file     Forced sexual activity: Not on file   Other Topics Concern   • Not on file   Social History Narrative   • Not on file     Breast Cancer-related family history is not on file.      Review of Systems   Constitutional: Negative for chills and fever.   HENT:        +tooth pain   Gastrointestinal: Negative for nausea and vomiting.   Musculoskeletal: Negative for myalgias.   Neurological: Negative for headaches.          Objective:     /110   Pulse 78   Temp 37 °C (98.6 °F) (Temporal)   Resp 18   Ht 1.715 m (5' 7.5\")   Wt 108 kg (238 lb)   SpO2 100%   BMI 36.73 kg/m²      Physical Exam  Vitals signs and nursing note reviewed.   Constitutional:       Appearance: Normal appearance.   HENT:      Mouth/Throat:      Dentition: Abnormal dentition. Dental caries present.   Cardiovascular:      Rate and Rhythm: Normal rate and regular rhythm.      Heart sounds: No murmur.   Pulmonary:      Effort: Pulmonary effort is normal.      Breath sounds: Normal breath sounds.   Musculoskeletal: Normal range of motion.   Skin:     General: Skin is warm and dry.   Neurological:      General: No focal deficit present.      Mental Status: She is alert.   Psychiatric:         Mood and Affect: Mood normal.               "   Assessment/Plan:       1. Dental infection  amoxicillin (AMOXIL) 875 MG tablet    ibuprofen (MOTRIN) 800 MG Tab     Salt water rinses.  Differential diagnosis, natural history, supportive care, and indications for immediate follow-up discussed at length.

## 2020-09-17 ENCOUNTER — HOSPITAL ENCOUNTER (OUTPATIENT)
Dept: LAB | Facility: MEDICAL CENTER | Age: 45
End: 2020-09-17
Attending: INTERNAL MEDICINE
Payer: COMMERCIAL

## 2020-09-17 LAB
ALBUMIN SERPL BCP-MCNC: 3.8 G/DL (ref 3.2–4.9)
ALT SERPL-CCNC: 7 U/L (ref 2–50)
APPEARANCE UR: ABNORMAL
AST SERPL-CCNC: 13 U/L (ref 12–45)
BACTERIA #/AREA URNS HPF: ABNORMAL /HPF
BASOPHILS # BLD AUTO: 0.2 % (ref 0–1.8)
BASOPHILS # BLD: 0.01 K/UL (ref 0–0.12)
BILIRUB UR QL STRIP.AUTO: NEGATIVE
COLOR UR: YELLOW
CREAT SERPL-MCNC: 0.81 MG/DL (ref 0.5–1.4)
CREAT UR-MCNC: 359.85 MG/DL
CRP SERPL HS-MCNC: 0.67 MG/DL (ref 0–0.75)
EOSINOPHIL # BLD AUTO: 0.07 K/UL (ref 0–0.51)
EOSINOPHIL NFR BLD: 1.4 % (ref 0–6.9)
EPI CELLS #/AREA URNS HPF: ABNORMAL /HPF
ERYTHROCYTE [DISTWIDTH] IN BLOOD BY AUTOMATED COUNT: 48.2 FL (ref 35.9–50)
ERYTHROCYTE [SEDIMENTATION RATE] IN BLOOD BY WESTERGREN METHOD: 14 MM/HOUR (ref 0–20)
GLUCOSE UR STRIP.AUTO-MCNC: NEGATIVE MG/DL
HCT VFR BLD AUTO: 30.7 % (ref 37–47)
HGB BLD-MCNC: 9.3 G/DL (ref 12–16)
IMM GRANULOCYTES # BLD AUTO: 0 K/UL (ref 0–0.11)
IMM GRANULOCYTES NFR BLD AUTO: 0 % (ref 0–0.9)
KETONES UR STRIP.AUTO-MCNC: ABNORMAL MG/DL
LEUKOCYTE ESTERASE UR QL STRIP.AUTO: NEGATIVE
LYMPHOCYTES # BLD AUTO: 1.72 K/UL (ref 1–4.8)
LYMPHOCYTES NFR BLD: 33.3 % (ref 22–41)
MCH RBC QN AUTO: 22 PG (ref 27–33)
MCHC RBC AUTO-ENTMCNC: 30.3 G/DL (ref 33.6–35)
MCV RBC AUTO: 72.7 FL (ref 81.4–97.8)
MICRO URNS: ABNORMAL
MONOCYTES # BLD AUTO: 0.44 K/UL (ref 0–0.85)
MONOCYTES NFR BLD AUTO: 8.5 % (ref 0–13.4)
MUCOUS THREADS #/AREA URNS HPF: ABNORMAL /HPF
NEUTROPHILS # BLD AUTO: 2.93 K/UL (ref 2–7.15)
NEUTROPHILS NFR BLD: 56.6 % (ref 44–72)
NITRITE UR QL STRIP.AUTO: NEGATIVE
NRBC # BLD AUTO: 0 K/UL
NRBC BLD-RTO: 0 /100 WBC
PH UR STRIP.AUTO: 6 [PH] (ref 5–8)
PLATELET # BLD AUTO: 271 K/UL (ref 164–446)
PMV BLD AUTO: 8.8 FL (ref 9–12.9)
PROT UR QL STRIP: NEGATIVE MG/DL
PROT UR-MCNC: 16 MG/DL (ref 0–15)
PROT/CREAT UR: 44 MG/G (ref 10–107)
RBC # BLD AUTO: 4.22 M/UL (ref 4.2–5.4)
RBC # URNS HPF: ABNORMAL /HPF
RBC UR QL AUTO: NEGATIVE
SP GR UR REFRACTOMETRY: 1.03
WBC # BLD AUTO: 5.2 K/UL (ref 4.8–10.8)
WBC #/AREA URNS HPF: ABNORMAL /HPF

## 2020-09-17 PROCEDURE — 82570 ASSAY OF URINE CREATININE: CPT

## 2020-09-17 PROCEDURE — 82565 ASSAY OF CREATININE: CPT

## 2020-09-17 PROCEDURE — 84450 TRANSFERASE (AST) (SGOT): CPT

## 2020-09-17 PROCEDURE — 82040 ASSAY OF SERUM ALBUMIN: CPT

## 2020-09-17 PROCEDURE — 81001 URINALYSIS AUTO W/SCOPE: CPT

## 2020-09-17 PROCEDURE — 85025 COMPLETE CBC W/AUTO DIFF WBC: CPT

## 2020-09-17 PROCEDURE — 85652 RBC SED RATE AUTOMATED: CPT

## 2020-09-17 PROCEDURE — 84460 ALANINE AMINO (ALT) (SGPT): CPT

## 2020-09-17 PROCEDURE — 86140 C-REACTIVE PROTEIN: CPT

## 2020-09-17 PROCEDURE — 36415 COLL VENOUS BLD VENIPUNCTURE: CPT

## 2020-09-17 PROCEDURE — 84156 ASSAY OF PROTEIN URINE: CPT

## 2021-05-13 ENCOUNTER — HOSPITAL ENCOUNTER (OUTPATIENT)
Dept: LAB | Facility: MEDICAL CENTER | Age: 46
End: 2021-05-13
Attending: INTERNAL MEDICINE
Payer: COMMERCIAL

## 2021-05-13 LAB
ALBUMIN SERPL BCP-MCNC: 3.7 G/DL (ref 3.2–4.9)
ALP SERPL-CCNC: 66 U/L (ref 30–99)
ALT SERPL-CCNC: 8 U/L (ref 2–50)
ANISOCYTOSIS BLD QL SMEAR: ABNORMAL
APPEARANCE UR: ABNORMAL
AST SERPL-CCNC: 14 U/L (ref 12–45)
BACTERIA #/AREA URNS HPF: ABNORMAL /HPF
BASOPHILS # BLD AUTO: 0.3 % (ref 0–1.8)
BASOPHILS # BLD: 0.02 K/UL (ref 0–0.12)
BILIRUB CONJ SERPL-MCNC: <0.2 MG/DL (ref 0.1–0.5)
BILIRUB INDIRECT SERPL-MCNC: NORMAL MG/DL (ref 0–1)
BILIRUB SERPL-MCNC: 0.4 MG/DL (ref 0.1–1.5)
BILIRUB UR QL STRIP.AUTO: NEGATIVE
COLOR UR: ABNORMAL
COMMENT 1642: NORMAL
CREAT SERPL-MCNC: 0.81 MG/DL (ref 0.5–1.4)
CREAT UR-MCNC: 429.82 MG/DL
CRP SERPL HS-MCNC: 2.35 MG/DL (ref 0–0.75)
DACRYOCYTES BLD QL SMEAR: NORMAL
EOSINOPHIL # BLD AUTO: 0.08 K/UL (ref 0–0.51)
EOSINOPHIL NFR BLD: 1.4 % (ref 0–6.9)
EPI CELLS #/AREA URNS HPF: ABNORMAL /HPF
ERYTHROCYTE [DISTWIDTH] IN BLOOD BY AUTOMATED COUNT: 48.2 FL (ref 35.9–50)
ERYTHROCYTE [SEDIMENTATION RATE] IN BLOOD BY WESTERGREN METHOD: 20 MM/HOUR (ref 0–20)
GLUCOSE UR STRIP.AUTO-MCNC: NEGATIVE MG/DL
HCT VFR BLD AUTO: 30.4 % (ref 37–47)
HGB BLD-MCNC: 8.8 G/DL (ref 12–16)
HYPOCHROMIA BLD QL SMEAR: ABNORMAL
IMM GRANULOCYTES # BLD AUTO: 0.01 K/UL (ref 0–0.11)
IMM GRANULOCYTES NFR BLD AUTO: 0.2 % (ref 0–0.9)
KETONES UR STRIP.AUTO-MCNC: NEGATIVE MG/DL
LEUKOCYTE ESTERASE UR QL STRIP.AUTO: NEGATIVE
LYMPHOCYTES # BLD AUTO: 1.54 K/UL (ref 1–4.8)
LYMPHOCYTES NFR BLD: 26.3 % (ref 22–41)
MCH RBC QN AUTO: 20.6 PG (ref 27–33)
MCHC RBC AUTO-ENTMCNC: 28.9 G/DL (ref 33.6–35)
MCV RBC AUTO: 71 FL (ref 81.4–97.8)
MICRO URNS: ABNORMAL
MICROCYTES BLD QL SMEAR: ABNORMAL
MONOCYTES # BLD AUTO: 0.39 K/UL (ref 0–0.85)
MONOCYTES NFR BLD AUTO: 6.7 % (ref 0–13.4)
MUCOUS THREADS #/AREA URNS HPF: ABNORMAL /HPF
NEUTROPHILS # BLD AUTO: 3.82 K/UL (ref 2–7.15)
NEUTROPHILS NFR BLD: 65.1 % (ref 44–72)
NITRITE UR QL STRIP.AUTO: NEGATIVE
NRBC # BLD AUTO: 0 K/UL
NRBC BLD-RTO: 0 /100 WBC
OVALOCYTES BLD QL SMEAR: NORMAL
PH UR STRIP.AUTO: 6 [PH] (ref 5–8)
PLATELET # BLD AUTO: 270 K/UL (ref 164–446)
PLATELET BLD QL SMEAR: NORMAL
PMV BLD AUTO: 8.4 FL (ref 9–12.9)
POLYCHROMASIA BLD QL SMEAR: NORMAL
PROT SERPL-MCNC: 7.2 G/DL (ref 6–8.2)
PROT UR QL STRIP: NEGATIVE MG/DL
PROT UR-MCNC: 24 MG/DL (ref 0–15)
PROT/CREAT UR: 56 MG/G (ref 10–107)
RBC # BLD AUTO: 4.28 M/UL (ref 4.2–5.4)
RBC # URNS HPF: ABNORMAL /HPF
RBC BLD AUTO: PRESENT
RBC UR QL AUTO: NEGATIVE
SP GR UR STRIP.AUTO: >=1.03
TARGETS BLD QL SMEAR: NORMAL
WBC # BLD AUTO: 5.9 K/UL (ref 4.8–10.8)
WBC #/AREA URNS HPF: ABNORMAL /HPF

## 2021-05-13 PROCEDURE — 82570 ASSAY OF URINE CREATININE: CPT

## 2021-05-13 PROCEDURE — 86140 C-REACTIVE PROTEIN: CPT

## 2021-05-13 PROCEDURE — 81001 URINALYSIS AUTO W/SCOPE: CPT

## 2021-05-13 PROCEDURE — 82565 ASSAY OF CREATININE: CPT

## 2021-05-13 PROCEDURE — 36415 COLL VENOUS BLD VENIPUNCTURE: CPT

## 2021-05-13 PROCEDURE — 85652 RBC SED RATE AUTOMATED: CPT

## 2021-05-13 PROCEDURE — 84156 ASSAY OF PROTEIN URINE: CPT

## 2021-05-13 PROCEDURE — 85025 COMPLETE CBC W/AUTO DIFF WBC: CPT

## 2021-05-13 PROCEDURE — 80076 HEPATIC FUNCTION PANEL: CPT

## 2021-06-07 ENCOUNTER — HOSPITAL ENCOUNTER (OUTPATIENT)
Dept: LAB | Facility: MEDICAL CENTER | Age: 46
End: 2021-06-07
Attending: INTERNAL MEDICINE
Payer: COMMERCIAL

## 2021-06-07 LAB
FERRITIN SERPL-MCNC: 7.7 NG/ML (ref 10–291)
FOLATE SERPL-MCNC: 16.4 NG/ML
IRON SATN MFR SERPL: 5 % (ref 15–55)
IRON SERPL-MCNC: 16 UG/DL (ref 40–170)
TIBC SERPL-MCNC: 341 UG/DL (ref 250–450)
UIBC SERPL-MCNC: 325 UG/DL (ref 110–370)
VIT B12 SERPL-MCNC: 642 PG/ML (ref 211–911)

## 2021-06-07 PROCEDURE — 82746 ASSAY OF FOLIC ACID SERUM: CPT

## 2021-06-07 PROCEDURE — 36415 COLL VENOUS BLD VENIPUNCTURE: CPT

## 2021-06-07 PROCEDURE — 82607 VITAMIN B-12: CPT

## 2021-06-07 PROCEDURE — 83540 ASSAY OF IRON: CPT

## 2021-06-07 PROCEDURE — 83550 IRON BINDING TEST: CPT

## 2021-06-07 PROCEDURE — 82728 ASSAY OF FERRITIN: CPT

## 2021-10-19 ENCOUNTER — APPOINTMENT (OUTPATIENT)
Dept: LAB | Facility: MEDICAL CENTER | Age: 46
End: 2021-10-19
Attending: INTERNAL MEDICINE
Payer: COMMERCIAL

## 2021-10-29 ENCOUNTER — HOSPITAL ENCOUNTER (OUTPATIENT)
Dept: LAB | Facility: MEDICAL CENTER | Age: 46
End: 2021-10-29
Attending: INTERNAL MEDICINE
Payer: COMMERCIAL

## 2021-10-29 LAB
ALBUMIN SERPL BCP-MCNC: 3.8 G/DL (ref 3.2–4.9)
ALP SERPL-CCNC: 68 U/L (ref 30–99)
ALT SERPL-CCNC: 6 U/L (ref 2–50)
AST SERPL-CCNC: 10 U/L (ref 12–45)
BASOPHILS # BLD AUTO: 0.4 % (ref 0–1.8)
BASOPHILS # BLD: 0.02 K/UL (ref 0–0.12)
BILIRUB CONJ SERPL-MCNC: <0.2 MG/DL (ref 0.1–0.5)
BILIRUB INDIRECT SERPL-MCNC: ABNORMAL MG/DL (ref 0–1)
BILIRUB SERPL-MCNC: 0.3 MG/DL (ref 0.1–1.5)
CRP SERPL HS-MCNC: 2.69 MG/DL (ref 0–0.75)
EOSINOPHIL # BLD AUTO: 0.1 K/UL (ref 0–0.51)
EOSINOPHIL NFR BLD: 1.9 % (ref 0–6.9)
ERYTHROCYTE [DISTWIDTH] IN BLOOD BY AUTOMATED COUNT: 48.6 FL (ref 35.9–50)
ERYTHROCYTE [SEDIMENTATION RATE] IN BLOOD BY WESTERGREN METHOD: 18 MM/HOUR (ref 0–25)
HCT VFR BLD AUTO: 38.9 % (ref 37–47)
HGB BLD-MCNC: 12.6 G/DL (ref 12–16)
IMM GRANULOCYTES # BLD AUTO: 0.02 K/UL (ref 0–0.11)
IMM GRANULOCYTES NFR BLD AUTO: 0.4 % (ref 0–0.9)
LYMPHOCYTES # BLD AUTO: 2.02 K/UL (ref 1–4.8)
LYMPHOCYTES NFR BLD: 38.4 % (ref 22–41)
MCH RBC QN AUTO: 30 PG (ref 27–33)
MCHC RBC AUTO-ENTMCNC: 32.4 G/DL (ref 33.6–35)
MCV RBC AUTO: 92.6 FL (ref 81.4–97.8)
MONOCYTES # BLD AUTO: 0.43 K/UL (ref 0–0.85)
MONOCYTES NFR BLD AUTO: 8.2 % (ref 0–13.4)
NEUTROPHILS # BLD AUTO: 2.67 K/UL (ref 2–7.15)
NEUTROPHILS NFR BLD: 50.7 % (ref 44–72)
NRBC # BLD AUTO: 0 K/UL
NRBC BLD-RTO: 0 /100 WBC
PLATELET # BLD AUTO: 211 K/UL (ref 164–446)
PMV BLD AUTO: 9.8 FL (ref 9–12.9)
PROT SERPL-MCNC: 6.5 G/DL (ref 6–8.2)
RBC # BLD AUTO: 4.2 M/UL (ref 4.2–5.4)
WBC # BLD AUTO: 5.3 K/UL (ref 4.8–10.8)

## 2021-10-29 PROCEDURE — 85025 COMPLETE CBC W/AUTO DIFF WBC: CPT

## 2021-10-29 PROCEDURE — 86140 C-REACTIVE PROTEIN: CPT

## 2021-10-29 PROCEDURE — 80076 HEPATIC FUNCTION PANEL: CPT

## 2021-10-29 PROCEDURE — 85652 RBC SED RATE AUTOMATED: CPT

## 2021-10-29 PROCEDURE — 36415 COLL VENOUS BLD VENIPUNCTURE: CPT

## 2022-06-10 ENCOUNTER — HOSPITAL ENCOUNTER (OUTPATIENT)
Dept: LAB | Facility: MEDICAL CENTER | Age: 47
End: 2022-06-10
Attending: INTERNAL MEDICINE
Payer: COMMERCIAL

## 2022-06-10 LAB
ALBUMIN SERPL BCP-MCNC: 3.5 G/DL (ref 3.2–4.9)
ALT SERPL-CCNC: 8 U/L (ref 2–50)
APPEARANCE UR: CLEAR
AST SERPL-CCNC: 10 U/L (ref 12–45)
BASOPHILS # BLD AUTO: 0.3 % (ref 0–1.8)
BASOPHILS # BLD: 0.02 K/UL (ref 0–0.12)
BILIRUB UR QL STRIP.AUTO: NEGATIVE
COLOR UR: YELLOW
CREAT SERPL-MCNC: 0.75 MG/DL (ref 0.5–1.4)
CREAT UR-MCNC: 334.69 MG/DL
CRP SERPL HS-MCNC: <0.3 MG/DL (ref 0–0.75)
EOSINOPHIL # BLD AUTO: 0.07 K/UL (ref 0–0.51)
EOSINOPHIL NFR BLD: 1 % (ref 0–6.9)
ERYTHROCYTE [DISTWIDTH] IN BLOOD BY AUTOMATED COUNT: 49.1 FL (ref 35.9–50)
ERYTHROCYTE [SEDIMENTATION RATE] IN BLOOD BY WESTERGREN METHOD: 17 MM/HOUR (ref 0–25)
GFR SERPLBLD CREATININE-BSD FMLA CKD-EPI: 100 ML/MIN/1.73 M 2
GLUCOSE UR STRIP.AUTO-MCNC: NEGATIVE MG/DL
HCT VFR BLD AUTO: 34.1 % (ref 37–47)
HGB BLD-MCNC: 10.6 G/DL (ref 12–16)
IMM GRANULOCYTES # BLD AUTO: 0.02 K/UL (ref 0–0.11)
IMM GRANULOCYTES NFR BLD AUTO: 0.3 % (ref 0–0.9)
KETONES UR STRIP.AUTO-MCNC: NEGATIVE MG/DL
LEUKOCYTE ESTERASE UR QL STRIP.AUTO: NEGATIVE
LYMPHOCYTES # BLD AUTO: 3.16 K/UL (ref 1–4.8)
LYMPHOCYTES NFR BLD: 43.9 % (ref 22–41)
MCH RBC QN AUTO: 25.9 PG (ref 27–33)
MCHC RBC AUTO-ENTMCNC: 31.1 G/DL (ref 33.6–35)
MCV RBC AUTO: 83.2 FL (ref 81.4–97.8)
MICRO URNS: NORMAL
MONOCYTES # BLD AUTO: 0.43 K/UL (ref 0–0.85)
MONOCYTES NFR BLD AUTO: 6 % (ref 0–13.4)
NEUTROPHILS # BLD AUTO: 3.5 K/UL (ref 2–7.15)
NEUTROPHILS NFR BLD: 48.5 % (ref 44–72)
NITRITE UR QL STRIP.AUTO: NEGATIVE
NRBC # BLD AUTO: 0 K/UL
NRBC BLD-RTO: 0 /100 WBC
PH UR STRIP.AUTO: 6 [PH] (ref 5–8)
PLATELET # BLD AUTO: 365 K/UL (ref 164–446)
PMV BLD AUTO: 9.3 FL (ref 9–12.9)
PROT UR QL STRIP: NEGATIVE MG/DL
PROT UR-MCNC: 15 MG/DL (ref 0–15)
PROT/CREAT UR: 45 MG/G (ref 10–107)
RBC # BLD AUTO: 4.1 M/UL (ref 4.2–5.4)
RBC UR QL AUTO: NEGATIVE
SP GR UR STRIP.AUTO: 1.02
WBC # BLD AUTO: 7.2 K/UL (ref 4.8–10.8)

## 2022-06-10 PROCEDURE — 81003 URINALYSIS AUTO W/O SCOPE: CPT

## 2022-06-10 PROCEDURE — 82570 ASSAY OF URINE CREATININE: CPT

## 2022-06-10 PROCEDURE — 82040 ASSAY OF SERUM ALBUMIN: CPT

## 2022-06-10 PROCEDURE — 84450 TRANSFERASE (AST) (SGOT): CPT

## 2022-06-10 PROCEDURE — 84460 ALANINE AMINO (ALT) (SGPT): CPT

## 2022-06-10 PROCEDURE — 82565 ASSAY OF CREATININE: CPT

## 2022-06-10 PROCEDURE — 86140 C-REACTIVE PROTEIN: CPT

## 2022-06-10 PROCEDURE — 84156 ASSAY OF PROTEIN URINE: CPT

## 2022-06-10 PROCEDURE — 36415 COLL VENOUS BLD VENIPUNCTURE: CPT

## 2022-06-10 PROCEDURE — 85652 RBC SED RATE AUTOMATED: CPT

## 2022-06-10 PROCEDURE — 85025 COMPLETE CBC W/AUTO DIFF WBC: CPT

## 2022-10-13 ENCOUNTER — HOSPITAL ENCOUNTER (OUTPATIENT)
Dept: LAB | Facility: MEDICAL CENTER | Age: 47
End: 2022-10-13
Attending: INTERNAL MEDICINE
Payer: COMMERCIAL

## 2022-10-13 LAB
ALBUMIN SERPL BCP-MCNC: 3.6 G/DL (ref 3.2–4.9)
ALT SERPL-CCNC: 7 U/L (ref 2–50)
APPEARANCE UR: CLEAR
AST SERPL-CCNC: 13 U/L (ref 12–45)
BASOPHILS # BLD AUTO: 0.2 % (ref 0–1.8)
BASOPHILS # BLD: 0.01 K/UL (ref 0–0.12)
BILIRUB UR QL STRIP.AUTO: NEGATIVE
C3 SERPL-MCNC: 108.6 MG/DL (ref 87–200)
C4 SERPL-MCNC: 19.5 MG/DL (ref 19–52)
COLOR UR: YELLOW
CREAT SERPL-MCNC: 0.61 MG/DL (ref 0.5–1.4)
CREAT UR-MCNC: 450.41 MG/DL
CRP SERPL HS-MCNC: 1.3 MG/DL (ref 0–0.75)
EOSINOPHIL # BLD AUTO: 0.07 K/UL (ref 0–0.51)
EOSINOPHIL NFR BLD: 1.4 % (ref 0–6.9)
ERYTHROCYTE [DISTWIDTH] IN BLOOD BY AUTOMATED COUNT: 50.1 FL (ref 35.9–50)
ERYTHROCYTE [SEDIMENTATION RATE] IN BLOOD BY WESTERGREN METHOD: 35 MM/HOUR (ref 0–25)
GFR SERPLBLD CREATININE-BSD FMLA CKD-EPI: 112 ML/MIN/1.73 M 2
GLUCOSE UR STRIP.AUTO-MCNC: NEGATIVE MG/DL
HCT VFR BLD AUTO: 28.9 % (ref 37–47)
HGB BLD-MCNC: 8.7 G/DL (ref 12–16)
IMM GRANULOCYTES # BLD AUTO: 0.01 K/UL (ref 0–0.11)
IMM GRANULOCYTES NFR BLD AUTO: 0.2 % (ref 0–0.9)
KETONES UR STRIP.AUTO-MCNC: ABNORMAL MG/DL
LEUKOCYTE ESTERASE UR QL STRIP.AUTO: NEGATIVE
LYMPHOCYTES # BLD AUTO: 1.05 K/UL (ref 1–4.8)
LYMPHOCYTES NFR BLD: 21.2 % (ref 22–41)
MCH RBC QN AUTO: 22.1 PG (ref 27–33)
MCHC RBC AUTO-ENTMCNC: 30.1 G/DL (ref 33.6–35)
MCV RBC AUTO: 73.4 FL (ref 81.4–97.8)
MICRO URNS: ABNORMAL
MONOCYTES # BLD AUTO: 0.26 K/UL (ref 0–0.85)
MONOCYTES NFR BLD AUTO: 5.2 % (ref 0–13.4)
NEUTROPHILS # BLD AUTO: 3.56 K/UL (ref 2–7.15)
NEUTROPHILS NFR BLD: 71.8 % (ref 44–72)
NITRITE UR QL STRIP.AUTO: NEGATIVE
NRBC # BLD AUTO: 0 K/UL
NRBC BLD-RTO: 0 /100 WBC
PH UR STRIP.AUTO: 6 [PH] (ref 5–8)
PLATELET # BLD AUTO: 383 K/UL (ref 164–446)
PMV BLD AUTO: 9.2 FL (ref 9–12.9)
PROT UR QL STRIP: NEGATIVE MG/DL
PROT UR-MCNC: 24 MG/DL (ref 0–15)
PROT/CREAT UR: 53 MG/G (ref 10–107)
RBC # BLD AUTO: 3.94 M/UL (ref 4.2–5.4)
RBC UR QL AUTO: NEGATIVE
SP GR UR STRIP.AUTO: >=1.03
WBC # BLD AUTO: 5 K/UL (ref 4.8–10.8)

## 2022-10-13 PROCEDURE — 86235 NUCLEAR ANTIGEN ANTIBODY: CPT | Mod: 91

## 2022-10-13 PROCEDURE — 82565 ASSAY OF CREATININE: CPT

## 2022-10-13 PROCEDURE — 86039 ANTINUCLEAR ANTIBODIES (ANA): CPT

## 2022-10-13 PROCEDURE — 36415 COLL VENOUS BLD VENIPUNCTURE: CPT

## 2022-10-13 PROCEDURE — 86038 ANTINUCLEAR ANTIBODIES: CPT

## 2022-10-13 PROCEDURE — 84450 TRANSFERASE (AST) (SGOT): CPT

## 2022-10-13 PROCEDURE — 82040 ASSAY OF SERUM ALBUMIN: CPT

## 2022-10-13 PROCEDURE — 84460 ALANINE AMINO (ALT) (SGPT): CPT

## 2022-10-13 PROCEDURE — 85652 RBC SED RATE AUTOMATED: CPT

## 2022-10-13 PROCEDURE — 83516 IMMUNOASSAY NONANTIBODY: CPT

## 2022-10-13 PROCEDURE — 81003 URINALYSIS AUTO W/O SCOPE: CPT

## 2022-10-13 PROCEDURE — 85025 COMPLETE CBC W/AUTO DIFF WBC: CPT

## 2022-10-13 PROCEDURE — 84156 ASSAY OF PROTEIN URINE: CPT

## 2022-10-13 PROCEDURE — 82570 ASSAY OF URINE CREATININE: CPT

## 2022-10-13 PROCEDURE — 86480 TB TEST CELL IMMUN MEASURE: CPT

## 2022-10-13 PROCEDURE — 86225 DNA ANTIBODY NATIVE: CPT

## 2022-10-13 PROCEDURE — 86160 COMPLEMENT ANTIGEN: CPT

## 2022-10-13 PROCEDURE — 86140 C-REACTIVE PROTEIN: CPT

## 2022-10-14 LAB
GAMMA INTERFERON BACKGROUND BLD IA-ACNC: 0.04 IU/ML
M TB IFN-G BLD-IMP: NEGATIVE
M TB IFN-G CD4+ BCKGRND COR BLD-ACNC: 0 IU/ML
MITOGEN IGNF BCKGRD COR BLD-ACNC: >10 IU/ML
QFT TB2 - NIL TBQ2: 0 IU/ML

## 2022-10-15 LAB — NUCLEAR IGG SER QL IA: DETECTED

## 2022-10-16 LAB — DSDNA AB TITR SER CLIF: 5 IU (ref 0–24)

## 2022-10-17 LAB
ANA INTERPRETIVE COMMENT Q5143: ABNORMAL
ANA PATTERN Q5144: ABNORMAL
ANA TITER Q5145: ABNORMAL
ANTINUCLEAR ANTIBODY (ANA), HEP-2, IGG Q5142: DETECTED
ENA SM IGG SER-ACNC: 94 AU/ML (ref 0–40)
ENA SS-B IGG SER IA-ACNC: 1 AU/ML (ref 0–40)
HISTONE IGG SER IA-ACNC: 0.4 UNITS (ref 0–0.9)
SSA52 R0ENA AB IGG Q0420: 2 AU/ML (ref 0–40)
SSA60 R0ENA AB IGG Q0419: 0 AU/ML (ref 0–40)
U1 SNRNP IGG SER QL: 128 UNITS (ref 0–19)

## 2022-10-18 LAB — CHROMATIN IGG SERPL-ACNC: 20 UNITS (ref 0–19)

## 2023-02-22 ENCOUNTER — HOSPITAL ENCOUNTER (OUTPATIENT)
Dept: LAB | Facility: MEDICAL CENTER | Age: 48
End: 2023-02-22
Attending: INTERNAL MEDICINE
Payer: COMMERCIAL

## 2023-02-22 LAB
ALBUMIN SERPL BCP-MCNC: 3.6 G/DL (ref 3.2–4.9)
ALP SERPL-CCNC: 63 U/L (ref 30–99)
ALT SERPL-CCNC: 6 U/L (ref 2–50)
ANISOCYTOSIS BLD QL SMEAR: ABNORMAL
AST SERPL-CCNC: 13 U/L (ref 12–45)
BASOPHILS # BLD AUTO: 0.2 % (ref 0–1.8)
BASOPHILS # BLD: 0.01 K/UL (ref 0–0.12)
BILIRUB CONJ SERPL-MCNC: <0.2 MG/DL (ref 0.1–0.5)
BILIRUB INDIRECT SERPL-MCNC: NORMAL MG/DL (ref 0–1)
BILIRUB SERPL-MCNC: 0.3 MG/DL (ref 0.1–1.5)
COMMENT 1642: NORMAL
CREAT SERPL-MCNC: 0.69 MG/DL (ref 0.5–1.4)
CRP SERPL HS-MCNC: 0.5 MG/DL (ref 0–0.75)
EOSINOPHIL # BLD AUTO: 0.08 K/UL (ref 0–0.51)
EOSINOPHIL NFR BLD: 1.6 % (ref 0–6.9)
ERYTHROCYTE [DISTWIDTH] IN BLOOD BY AUTOMATED COUNT: 53.9 FL (ref 35.9–50)
ERYTHROCYTE [SEDIMENTATION RATE] IN BLOOD BY WESTERGREN METHOD: 40 MM/HOUR (ref 0–25)
GFR SERPLBLD CREATININE-BSD FMLA CKD-EPI: 109 ML/MIN/1.73 M 2
HCT VFR BLD AUTO: 29.8 % (ref 37–47)
HGB BLD-MCNC: 8.4 G/DL (ref 12–16)
HYPOCHROMIA BLD QL SMEAR: ABNORMAL
IMM GRANULOCYTES # BLD AUTO: 0.01 K/UL (ref 0–0.11)
IMM GRANULOCYTES NFR BLD AUTO: 0.2 % (ref 0–0.9)
LYMPHOCYTES # BLD AUTO: 1.44 K/UL (ref 1–4.8)
LYMPHOCYTES NFR BLD: 28.9 % (ref 22–41)
MCH RBC QN AUTO: 19.6 PG (ref 27–33)
MCHC RBC AUTO-ENTMCNC: 28.2 G/DL (ref 33.6–35)
MCV RBC AUTO: 69.6 FL (ref 81.4–97.8)
MICROCYTES BLD QL SMEAR: ABNORMAL
MONOCYTES # BLD AUTO: 0.36 K/UL (ref 0–0.85)
MONOCYTES NFR BLD AUTO: 7.2 % (ref 0–13.4)
NEUTROPHILS # BLD AUTO: 3.08 K/UL (ref 2–7.15)
NEUTROPHILS NFR BLD: 61.9 % (ref 44–72)
NRBC # BLD AUTO: 0 K/UL
NRBC BLD-RTO: 0 /100 WBC
PLATELET # BLD AUTO: 416 K/UL (ref 164–446)
PLATELET BLD QL SMEAR: NORMAL
PMV BLD AUTO: 9.1 FL (ref 9–12.9)
POLYCHROMASIA BLD QL SMEAR: NORMAL
PROT SERPL-MCNC: 6.7 G/DL (ref 6–8.2)
RBC # BLD AUTO: 4.28 M/UL (ref 4.2–5.4)
RBC BLD AUTO: PRESENT
WBC # BLD AUTO: 5 K/UL (ref 4.8–10.8)

## 2023-02-22 PROCEDURE — 80076 HEPATIC FUNCTION PANEL: CPT

## 2023-02-22 PROCEDURE — 85652 RBC SED RATE AUTOMATED: CPT

## 2023-02-22 PROCEDURE — 86140 C-REACTIVE PROTEIN: CPT

## 2023-02-22 PROCEDURE — 85025 COMPLETE CBC W/AUTO DIFF WBC: CPT

## 2023-02-22 PROCEDURE — 36415 COLL VENOUS BLD VENIPUNCTURE: CPT

## 2023-02-22 PROCEDURE — 82565 ASSAY OF CREATININE: CPT

## 2023-03-22 ENCOUNTER — HOSPITAL ENCOUNTER (OUTPATIENT)
Dept: LAB | Facility: MEDICAL CENTER | Age: 48
End: 2023-03-22
Attending: INTERNAL MEDICINE
Payer: COMMERCIAL

## 2023-03-22 LAB
FERRITIN SERPL-MCNC: 5.8 NG/ML (ref 10–291)
IRON SATN MFR SERPL: 4 % (ref 15–55)
IRON SERPL-MCNC: 13 UG/DL (ref 40–170)
TIBC SERPL-MCNC: 359 UG/DL (ref 250–450)
UIBC SERPL-MCNC: 346 UG/DL (ref 110–370)

## 2023-03-22 PROCEDURE — 83550 IRON BINDING TEST: CPT

## 2023-03-22 PROCEDURE — 82728 ASSAY OF FERRITIN: CPT

## 2023-03-22 PROCEDURE — 36415 COLL VENOUS BLD VENIPUNCTURE: CPT

## 2023-03-22 PROCEDURE — 83540 ASSAY OF IRON: CPT

## 2023-10-16 ENCOUNTER — HOSPITAL ENCOUNTER (OUTPATIENT)
Dept: LAB | Facility: MEDICAL CENTER | Age: 48
End: 2023-10-16
Attending: REGISTERED NURSE
Payer: COMMERCIAL

## 2023-10-16 ENCOUNTER — HOSPITAL ENCOUNTER (OUTPATIENT)
Dept: LAB | Facility: MEDICAL CENTER | Age: 48
End: 2023-10-16
Attending: INTERNAL MEDICINE
Payer: COMMERCIAL

## 2023-10-16 LAB
ALBUMIN SERPL BCP-MCNC: 3.7 G/DL (ref 3.2–4.9)
ALP SERPL-CCNC: 67 U/L (ref 30–99)
ALT SERPL-CCNC: 8 U/L (ref 2–50)
ANISOCYTOSIS BLD QL SMEAR: ABNORMAL
APPEARANCE UR: ABNORMAL
AST SERPL-CCNC: 13 U/L (ref 12–45)
BACTERIA #/AREA URNS HPF: ABNORMAL /HPF
BASOPHILS # BLD AUTO: 0.2 % (ref 0–1.8)
BASOPHILS # BLD: 0.01 K/UL (ref 0–0.12)
BILIRUB CONJ SERPL-MCNC: <0.2 MG/DL (ref 0.1–0.5)
BILIRUB INDIRECT SERPL-MCNC: NORMAL MG/DL (ref 0–1)
BILIRUB SERPL-MCNC: 0.4 MG/DL (ref 0.1–1.5)
BILIRUB UR QL STRIP.AUTO: ABNORMAL
C3 SERPL-MCNC: 84.8 MG/DL (ref 87–200)
C4 SERPL-MCNC: 12.8 MG/DL (ref 19–52)
CHOLEST SERPL-MCNC: 145 MG/DL (ref 100–199)
COLOR UR: YELLOW
CREAT SERPL-MCNC: 0.71 MG/DL (ref 0.5–1.4)
CRP SERPL HS-MCNC: 0.51 MG/DL (ref 0–0.75)
EOSINOPHIL # BLD AUTO: 0.1 K/UL (ref 0–0.51)
EOSINOPHIL NFR BLD: 2.5 % (ref 0–6.9)
EPI CELLS #/AREA URNS HPF: ABNORMAL /HPF
ERYTHROCYTE [DISTWIDTH] IN BLOOD BY AUTOMATED COUNT: 51.8 FL (ref 35.9–50)
ERYTHROCYTE [SEDIMENTATION RATE] IN BLOOD BY WESTERGREN METHOD: 19 MM/HOUR (ref 0–25)
EST. AVERAGE GLUCOSE BLD GHB EST-MCNC: 111 MG/DL
FASTING STATUS PATIENT QL REPORTED: NORMAL
GFR SERPLBLD CREATININE-BSD FMLA CKD-EPI: 106 ML/MIN/1.73 M 2
GLUCOSE UR STRIP.AUTO-MCNC: NEGATIVE MG/DL
HBA1C MFR BLD: 5.5 % (ref 4–5.6)
HCT VFR BLD AUTO: 31.7 % (ref 37–47)
HDLC SERPL-MCNC: 72 MG/DL
HGB BLD-MCNC: 9.4 G/DL (ref 12–16)
IMM GRANULOCYTES # BLD AUTO: 0 K/UL (ref 0–0.11)
IMM GRANULOCYTES NFR BLD AUTO: 0 % (ref 0–0.9)
KETONES UR STRIP.AUTO-MCNC: NEGATIVE MG/DL
LDLC SERPL CALC-MCNC: 67 MG/DL
LEUKOCYTE ESTERASE UR QL STRIP.AUTO: NEGATIVE
LYMPHOCYTES # BLD AUTO: 1.42 K/UL (ref 1–4.8)
LYMPHOCYTES NFR BLD: 34.8 % (ref 22–41)
MCH RBC QN AUTO: 21.6 PG (ref 27–33)
MCHC RBC AUTO-ENTMCNC: 29.7 G/DL (ref 32.2–35.5)
MCV RBC AUTO: 72.7 FL (ref 81.4–97.8)
MICRO URNS: ABNORMAL
MICROCYTES BLD QL SMEAR: ABNORMAL
MONOCYTES # BLD AUTO: 0.3 K/UL (ref 0–0.85)
MONOCYTES NFR BLD AUTO: 7.4 % (ref 0–13.4)
NEUTROPHILS # BLD AUTO: 2.25 K/UL (ref 1.82–7.42)
NEUTROPHILS NFR BLD: 55.1 % (ref 44–72)
NITRITE UR QL STRIP.AUTO: NEGATIVE
NRBC # BLD AUTO: 0 K/UL
NRBC BLD-RTO: 0 /100 WBC (ref 0–0.2)
PH UR STRIP.AUTO: 6.5 [PH] (ref 5–8)
PLATELET # BLD AUTO: 312 K/UL (ref 164–446)
PLATELET BLD QL SMEAR: NORMAL
PMV BLD AUTO: 9.4 FL (ref 9–12.9)
PROT SERPL-MCNC: 7.3 G/DL (ref 6–8.2)
PROT UR QL STRIP: NEGATIVE MG/DL
RBC # BLD AUTO: 4.36 M/UL (ref 4.2–5.4)
RBC # URNS HPF: ABNORMAL /HPF
RBC BLD AUTO: PRESENT
RBC UR QL AUTO: NEGATIVE
SP GR UR STRIP.AUTO: 1.02
TRIGL SERPL-MCNC: 28 MG/DL (ref 0–149)
WBC # BLD AUTO: 4.1 K/UL (ref 4.8–10.8)
WBC #/AREA URNS HPF: ABNORMAL /HPF

## 2023-10-16 PROCEDURE — 83036 HEMOGLOBIN GLYCOSYLATED A1C: CPT

## 2023-10-16 PROCEDURE — 86140 C-REACTIVE PROTEIN: CPT

## 2023-10-16 PROCEDURE — 80076 HEPATIC FUNCTION PANEL: CPT

## 2023-10-16 PROCEDURE — 82565 ASSAY OF CREATININE: CPT

## 2023-10-16 PROCEDURE — 82570 ASSAY OF URINE CREATININE: CPT

## 2023-10-16 PROCEDURE — 85652 RBC SED RATE AUTOMATED: CPT

## 2023-10-16 PROCEDURE — 85025 COMPLETE CBC W/AUTO DIFF WBC: CPT

## 2023-10-16 PROCEDURE — 81001 URINALYSIS AUTO W/SCOPE: CPT

## 2023-10-16 PROCEDURE — 36415 COLL VENOUS BLD VENIPUNCTURE: CPT

## 2023-10-16 PROCEDURE — 86160 COMPLEMENT ANTIGEN: CPT

## 2023-10-16 PROCEDURE — 80061 LIPID PANEL: CPT

## 2023-10-16 PROCEDURE — 84156 ASSAY OF PROTEIN URINE: CPT

## 2023-10-19 LAB
CREAT UR-MCNC: 309.95 MG/DL
PROT UR-MCNC: 33 MG/DL (ref 0–15)
PROT/CREAT UR: 106 MG/G (ref 10–107)

## 2024-03-06 ENCOUNTER — HOSPITAL ENCOUNTER (OUTPATIENT)
Dept: LAB | Facility: MEDICAL CENTER | Age: 49
End: 2024-03-06
Attending: INTERNAL MEDICINE
Payer: COMMERCIAL

## 2024-03-06 LAB
ALBUMIN SERPL BCP-MCNC: 4.3 G/DL (ref 3.2–4.9)
ALP SERPL-CCNC: 58 U/L (ref 30–99)
ALT SERPL-CCNC: 11 U/L (ref 2–50)
ANISOCYTOSIS BLD QL SMEAR: ABNORMAL
AST SERPL-CCNC: 16 U/L (ref 12–45)
BASOPHILS # BLD AUTO: 0.4 % (ref 0–1.8)
BASOPHILS # BLD: 0.02 K/UL (ref 0–0.12)
BILIRUB CONJ SERPL-MCNC: <0.2 MG/DL (ref 0.1–0.5)
BILIRUB INDIRECT SERPL-MCNC: NORMAL MG/DL (ref 0–1)
BILIRUB SERPL-MCNC: 0.3 MG/DL (ref 0.1–1.5)
CREAT SERPL-MCNC: 0.82 MG/DL (ref 0.5–1.4)
CRP SERPL HS-MCNC: <0.3 MG/DL (ref 0–0.75)
EOSINOPHIL # BLD AUTO: 0.07 K/UL (ref 0–0.51)
EOSINOPHIL NFR BLD: 1.5 % (ref 0–6.9)
ERYTHROCYTE [DISTWIDTH] IN BLOOD BY AUTOMATED COUNT: 53.4 FL (ref 35.9–50)
ERYTHROCYTE [SEDIMENTATION RATE] IN BLOOD BY WESTERGREN METHOD: 12 MM/HOUR (ref 0–25)
GFR SERPLBLD CREATININE-BSD FMLA CKD-EPI: 89 ML/MIN/1.73 M 2
HCT VFR BLD AUTO: 30.2 % (ref 37–47)
HGB BLD-MCNC: 9 G/DL (ref 12–16)
HYPOCHROMIA BLD QL SMEAR: ABNORMAL
IMM GRANULOCYTES # BLD AUTO: 0.02 K/UL (ref 0–0.11)
IMM GRANULOCYTES NFR BLD AUTO: 0.4 % (ref 0–0.9)
LG PLATELETS BLD QL SMEAR: NORMAL
LYMPHOCYTES # BLD AUTO: 1.58 K/UL (ref 1–4.8)
LYMPHOCYTES NFR BLD: 34.6 % (ref 22–41)
MCH RBC QN AUTO: 22.1 PG (ref 27–33)
MCHC RBC AUTO-ENTMCNC: 29.8 G/DL (ref 32.2–35.5)
MCV RBC AUTO: 74.2 FL (ref 81.4–97.8)
MICROCYTES BLD QL SMEAR: ABNORMAL
MONOCYTES # BLD AUTO: 0.41 K/UL (ref 0–0.85)
MONOCYTES NFR BLD AUTO: 9 % (ref 0–13.4)
NEUTROPHILS # BLD AUTO: 2.47 K/UL (ref 1.82–7.42)
NEUTROPHILS NFR BLD: 54.1 % (ref 44–72)
NRBC # BLD AUTO: 0 K/UL
NRBC BLD-RTO: 0 /100 WBC (ref 0–0.2)
OVALOCYTES BLD QL SMEAR: NORMAL
PLATELET # BLD AUTO: 380 K/UL (ref 164–446)
PLATELET BLD QL SMEAR: NORMAL
PMV BLD AUTO: 9.5 FL (ref 9–12.9)
POIKILOCYTOSIS BLD QL SMEAR: NORMAL
POLYCHROMASIA BLD QL SMEAR: NORMAL
PROT SERPL-MCNC: 7.5 G/DL (ref 6–8.2)
RBC # BLD AUTO: 4.07 M/UL (ref 4.2–5.4)
RBC BLD AUTO: PRESENT
SPHEROCYTES BLD QL SMEAR: NORMAL
TARGETS BLD QL SMEAR: NORMAL
WBC # BLD AUTO: 4.6 K/UL (ref 4.8–10.8)

## 2024-03-06 PROCEDURE — 36415 COLL VENOUS BLD VENIPUNCTURE: CPT

## 2024-03-06 PROCEDURE — 82565 ASSAY OF CREATININE: CPT

## 2024-03-06 PROCEDURE — 85025 COMPLETE CBC W/AUTO DIFF WBC: CPT

## 2024-03-06 PROCEDURE — 86140 C-REACTIVE PROTEIN: CPT

## 2024-03-06 PROCEDURE — 80076 HEPATIC FUNCTION PANEL: CPT

## 2024-03-06 PROCEDURE — 85652 RBC SED RATE AUTOMATED: CPT

## 2024-03-06 PROCEDURE — 86480 TB TEST CELL IMMUN MEASURE: CPT

## 2024-03-08 LAB
GAMMA INTERFERON BACKGROUND BLD IA-ACNC: 0.03 IU/ML
M TB IFN-G BLD-IMP: NEGATIVE
M TB IFN-G CD4+ BCKGRND COR BLD-ACNC: 0.01 IU/ML
MITOGEN IGNF BCKGRD COR BLD-ACNC: >10 IU/ML
QFT TB2 - NIL TBQ2: 0.01 IU/ML

## 2024-07-10 ENCOUNTER — HOSPITAL ENCOUNTER (OUTPATIENT)
Dept: LAB | Facility: MEDICAL CENTER | Age: 49
End: 2024-07-10
Attending: INTERNAL MEDICINE
Payer: COMMERCIAL

## 2024-07-10 LAB
ALBUMIN SERPL BCP-MCNC: 3.8 G/DL (ref 3.2–4.9)
ALP SERPL-CCNC: 53 U/L (ref 30–99)
ALT SERPL-CCNC: 7 U/L (ref 2–50)
ANISOCYTOSIS BLD QL SMEAR: ABNORMAL
APPEARANCE UR: ABNORMAL
AST SERPL-CCNC: 13 U/L (ref 12–45)
BACTERIA #/AREA URNS HPF: ABNORMAL /HPF
BASOPHILS # BLD AUTO: 0.2 % (ref 0–1.8)
BASOPHILS # BLD: 0.01 K/UL (ref 0–0.12)
BILIRUB CONJ SERPL-MCNC: <0.2 MG/DL (ref 0.1–0.5)
BILIRUB INDIRECT SERPL-MCNC: NORMAL MG/DL (ref 0–1)
BILIRUB SERPL-MCNC: 0.4 MG/DL (ref 0.1–1.5)
BILIRUB UR QL STRIP.AUTO: NEGATIVE
C3 SERPL-MCNC: 82.1 MG/DL (ref 87–200)
C4 SERPL-MCNC: 14.1 MG/DL (ref 19–52)
COLOR UR: YELLOW
CRP SERPL HS-MCNC: <0.3 MG/DL (ref 0–0.75)
EOSINOPHIL # BLD AUTO: 0.05 K/UL (ref 0–0.51)
EOSINOPHIL NFR BLD: 1 % (ref 0–6.9)
EPI CELLS #/AREA URNS HPF: ABNORMAL /HPF
ERYTHROCYTE [DISTWIDTH] IN BLOOD BY AUTOMATED COUNT: 49.3 FL (ref 35.9–50)
ERYTHROCYTE [SEDIMENTATION RATE] IN BLOOD BY WESTERGREN METHOD: 15 MM/HOUR (ref 0–25)
GLUCOSE UR STRIP.AUTO-MCNC: NEGATIVE MG/DL
HCT VFR BLD AUTO: 24.6 % (ref 37–47)
HGB BLD-MCNC: 6.8 G/DL (ref 12–16)
HYPOCHROMIA BLD QL SMEAR: ABNORMAL
IMM GRANULOCYTES # BLD AUTO: 0.02 K/UL (ref 0–0.11)
IMM GRANULOCYTES NFR BLD AUTO: 0.4 % (ref 0–0.9)
KETONES UR STRIP.AUTO-MCNC: NEGATIVE MG/DL
LEUKOCYTE ESTERASE UR QL STRIP.AUTO: NEGATIVE
LYMPHOCYTES # BLD AUTO: 1.51 K/UL (ref 1–4.8)
LYMPHOCYTES NFR BLD: 31.4 % (ref 22–41)
MCH RBC QN AUTO: 18.1 PG (ref 27–33)
MCHC RBC AUTO-ENTMCNC: 27.3 G/DL (ref 32.2–35.5)
MCV RBC AUTO: 66.4 FL (ref 81.4–97.8)
MICRO URNS: ABNORMAL
MICROCYTES BLD QL SMEAR: ABNORMAL
MONOCYTES # BLD AUTO: 0.37 K/UL (ref 0–0.85)
MONOCYTES NFR BLD AUTO: 7.7 % (ref 0–13.4)
MUCOUS THREADS #/AREA URNS HPF: ABNORMAL /HPF
NEUTROPHILS # BLD AUTO: 2.85 K/UL (ref 1.82–7.42)
NEUTROPHILS NFR BLD: 59.3 % (ref 44–72)
NITRITE UR QL STRIP.AUTO: NEGATIVE
NRBC # BLD AUTO: 0 K/UL
NRBC BLD-RTO: 0 /100 WBC (ref 0–0.2)
OVALOCYTES BLD QL SMEAR: NORMAL
PH UR STRIP.AUTO: 5.5 [PH] (ref 5–8)
PLATELET # BLD AUTO: 288 K/UL (ref 164–446)
PLATELET BLD QL SMEAR: NORMAL
PMV BLD AUTO: 8.7 FL (ref 9–12.9)
POIKILOCYTOSIS BLD QL SMEAR: NORMAL
POLYCHROMASIA BLD QL SMEAR: NORMAL
PROT SERPL-MCNC: 7.1 G/DL (ref 6–8.2)
PROT UR QL STRIP: NEGATIVE MG/DL
RBC # BLD AUTO: 3.75 M/UL (ref 4.2–5.4)
RBC # URNS HPF: ABNORMAL /HPF
RBC BLD AUTO: PRESENT
RBC UR QL AUTO: ABNORMAL
SP GR UR STRIP.AUTO: 1.02
WBC # BLD AUTO: 4.8 K/UL (ref 4.8–10.8)
WBC #/AREA URNS HPF: ABNORMAL /HPF

## 2024-07-10 PROCEDURE — 86160 COMPLEMENT ANTIGEN: CPT

## 2024-07-10 PROCEDURE — 84156 ASSAY OF PROTEIN URINE: CPT

## 2024-07-10 PROCEDURE — 82570 ASSAY OF URINE CREATININE: CPT

## 2024-07-10 PROCEDURE — 36415 COLL VENOUS BLD VENIPUNCTURE: CPT

## 2024-07-10 PROCEDURE — 81001 URINALYSIS AUTO W/SCOPE: CPT

## 2024-07-10 PROCEDURE — 80076 HEPATIC FUNCTION PANEL: CPT

## 2024-07-10 PROCEDURE — 86140 C-REACTIVE PROTEIN: CPT

## 2024-07-10 PROCEDURE — 85025 COMPLETE CBC W/AUTO DIFF WBC: CPT

## 2024-07-10 PROCEDURE — 85652 RBC SED RATE AUTOMATED: CPT

## 2024-07-11 LAB
CREAT UR-MCNC: 176.07 MG/DL
PROT UR-MCNC: 11 MG/DL (ref 0–15)
PROT/CREAT UR: 62 MG/G (ref 10–107)

## 2024-10-30 ENCOUNTER — HOSPITAL ENCOUNTER (OUTPATIENT)
Dept: LAB | Facility: MEDICAL CENTER | Age: 49
End: 2024-10-30
Attending: INTERNAL MEDICINE
Payer: COMMERCIAL

## 2024-10-30 LAB
ALBUMIN SERPL BCP-MCNC: 3.9 G/DL (ref 3.2–4.9)
ALP SERPL-CCNC: 58 U/L (ref 30–99)
ALT SERPL-CCNC: 8 U/L (ref 2–50)
APPEARANCE UR: ABNORMAL
AST SERPL-CCNC: 14 U/L (ref 12–45)
BACTERIA #/AREA URNS HPF: ABNORMAL /HPF
BASOPHILS # BLD AUTO: 0.2 % (ref 0–1.8)
BASOPHILS # BLD: 0.01 K/UL (ref 0–0.12)
BILIRUB CONJ SERPL-MCNC: <0.2 MG/DL (ref 0.1–0.5)
BILIRUB INDIRECT SERPL-MCNC: NORMAL MG/DL (ref 0–1)
BILIRUB SERPL-MCNC: 0.3 MG/DL (ref 0.1–1.5)
BILIRUB UR QL STRIP.AUTO: ABNORMAL
COLOR UR: ABNORMAL
CREAT SERPL-MCNC: 0.83 MG/DL (ref 0.5–1.4)
CRP SERPL HS-MCNC: 1.6 MG/L (ref 0–3)
EOSINOPHIL # BLD AUTO: 0.04 K/UL (ref 0–0.51)
EOSINOPHIL NFR BLD: 0.9 % (ref 0–6.9)
EPI CELLS #/AREA URNS HPF: ABNORMAL /HPF
ERYTHROCYTE [DISTWIDTH] IN BLOOD BY AUTOMATED COUNT: 67.3 FL (ref 35.9–50)
ERYTHROCYTE [SEDIMENTATION RATE] IN BLOOD BY WESTERGREN METHOD: 4 MM/HOUR (ref 0–25)
GFR SERPLBLD CREATININE-BSD FMLA CKD-EPI: 87 ML/MIN/1.73 M 2
GLUCOSE UR STRIP.AUTO-MCNC: NEGATIVE MG/DL
HCT VFR BLD AUTO: 41.1 % (ref 37–47)
HGB BLD-MCNC: 13 G/DL (ref 12–16)
IMM GRANULOCYTES # BLD AUTO: 0.01 K/UL (ref 0–0.11)
IMM GRANULOCYTES NFR BLD AUTO: 0.2 % (ref 0–0.9)
KETONES UR STRIP.AUTO-MCNC: NEGATIVE MG/DL
LEUKOCYTE ESTERASE UR QL STRIP.AUTO: NEGATIVE
LYMPHOCYTES # BLD AUTO: 0.84 K/UL (ref 1–4.8)
LYMPHOCYTES NFR BLD: 18.3 % (ref 22–41)
MCH RBC QN AUTO: 29.4 PG (ref 27–33)
MCHC RBC AUTO-ENTMCNC: 31.6 G/DL (ref 32.2–35.5)
MCV RBC AUTO: 93 FL (ref 81.4–97.8)
MICRO URNS: ABNORMAL
MONOCYTES # BLD AUTO: 0.32 K/UL (ref 0–0.85)
MONOCYTES NFR BLD AUTO: 7 % (ref 0–13.4)
MUCOUS THREADS #/AREA URNS HPF: ABNORMAL /HPF
NEUTROPHILS # BLD AUTO: 3.36 K/UL (ref 1.82–7.42)
NEUTROPHILS NFR BLD: 73.4 % (ref 44–72)
NITRITE UR QL STRIP.AUTO: POSITIVE
NRBC # BLD AUTO: 0.02 K/UL
NRBC BLD-RTO: 0.4 /100 WBC (ref 0–0.2)
PH UR STRIP.AUTO: 6 [PH] (ref 5–8)
PLATELET # BLD AUTO: 284 K/UL (ref 164–446)
PMV BLD AUTO: 9.2 FL (ref 9–12.9)
PROT SERPL-MCNC: 6.8 G/DL (ref 6–8.2)
PROT UR QL STRIP: NEGATIVE MG/DL
RBC # BLD AUTO: 4.42 M/UL (ref 4.2–5.4)
RBC # URNS HPF: ABNORMAL /HPF
RBC UR QL AUTO: NEGATIVE
SP GR UR STRIP.AUTO: 1.02
WBC # BLD AUTO: 4.6 K/UL (ref 4.8–10.8)
WBC #/AREA URNS HPF: ABNORMAL /HPF

## 2024-10-30 PROCEDURE — 36415 COLL VENOUS BLD VENIPUNCTURE: CPT

## 2024-10-30 PROCEDURE — 86140 C-REACTIVE PROTEIN: CPT

## 2024-10-30 PROCEDURE — 84156 ASSAY OF PROTEIN URINE: CPT

## 2024-10-30 PROCEDURE — 85025 COMPLETE CBC W/AUTO DIFF WBC: CPT

## 2024-10-30 PROCEDURE — 80076 HEPATIC FUNCTION PANEL: CPT

## 2024-10-30 PROCEDURE — 86141 C-REACTIVE PROTEIN HS: CPT

## 2024-10-30 PROCEDURE — 81001 URINALYSIS AUTO W/SCOPE: CPT

## 2024-10-30 PROCEDURE — 85652 RBC SED RATE AUTOMATED: CPT

## 2024-10-30 PROCEDURE — 82565 ASSAY OF CREATININE: CPT

## 2024-10-30 PROCEDURE — 82570 ASSAY OF URINE CREATININE: CPT

## 2024-10-31 LAB
CREAT UR-MCNC: 394.07 MG/DL
CRP SERPL HS-MCNC: <0.3 MG/DL (ref 0–0.75)
PROT UR-MCNC: 24 MG/DL (ref 0–15)
PROT/CREAT UR: 61 MG/G (ref 10–107)

## 2024-11-05 LAB — CRP SERPL HS-MCNC: NORMAL MG/L (ref 0–3)

## 2025-06-20 ENCOUNTER — HOSPITAL ENCOUNTER (OUTPATIENT)
Facility: MEDICAL CENTER | Age: 50
End: 2025-06-20
Attending: INTERNAL MEDICINE
Payer: COMMERCIAL

## 2025-06-20 LAB
ALBUMIN SERPL BCP-MCNC: 3.8 G/DL (ref 3.2–4.9)
ALP SERPL-CCNC: 54 U/L (ref 30–99)
ALT SERPL-CCNC: 11 U/L (ref 2–50)
APPEARANCE UR: CLEAR
AST SERPL-CCNC: 20 U/L (ref 12–45)
BASOPHILS # BLD AUTO: 0.5 % (ref 0–1.8)
BASOPHILS # BLD: 0.02 K/UL (ref 0–0.12)
BILIRUB CONJ SERPL-MCNC: <0.2 MG/DL (ref 0.1–0.5)
BILIRUB INDIRECT SERPL-MCNC: NORMAL MG/DL (ref 0–1)
BILIRUB SERPL-MCNC: 0.3 MG/DL (ref 0.1–1.5)
BILIRUB UR QL STRIP.AUTO: NEGATIVE
C3 SERPL-MCNC: 102 MG/DL (ref 87–200)
C4 SERPL-MCNC: 17 MG/DL (ref 19–52)
COLOR UR: YELLOW
CREAT UR-MCNC: 112 MG/DL
CRP SERPL HS-MCNC: <0.3 MG/DL (ref 0–0.75)
EOSINOPHIL # BLD AUTO: 0.07 K/UL (ref 0–0.51)
EOSINOPHIL NFR BLD: 1.8 % (ref 0–6.9)
ERYTHROCYTE [DISTWIDTH] IN BLOOD BY AUTOMATED COUNT: 49.5 FL (ref 35.9–50)
ERYTHROCYTE [SEDIMENTATION RATE] IN BLOOD BY WESTERGREN METHOD: 9 MM/HOUR (ref 0–25)
GLUCOSE UR STRIP.AUTO-MCNC: NEGATIVE MG/DL
HCT VFR BLD AUTO: 40.9 % (ref 37–47)
HGB BLD-MCNC: 13.3 G/DL (ref 12–16)
IMM GRANULOCYTES # BLD AUTO: 0.01 K/UL (ref 0–0.11)
IMM GRANULOCYTES NFR BLD AUTO: 0.3 % (ref 0–0.9)
KETONES UR STRIP.AUTO-MCNC: NEGATIVE MG/DL
LEUKOCYTE ESTERASE UR QL STRIP.AUTO: NEGATIVE
LYMPHOCYTES # BLD AUTO: 1.32 K/UL (ref 1–4.8)
LYMPHOCYTES NFR BLD: 33 % (ref 22–41)
MCH RBC QN AUTO: 30.4 PG (ref 27–33)
MCHC RBC AUTO-ENTMCNC: 32.5 G/DL (ref 32.2–35.5)
MCV RBC AUTO: 93.4 FL (ref 81.4–97.8)
MICRO URNS: NORMAL
MONOCYTES # BLD AUTO: 0.36 K/UL (ref 0–0.85)
MONOCYTES NFR BLD AUTO: 9 % (ref 0–13.4)
NEUTROPHILS # BLD AUTO: 2.22 K/UL (ref 1.82–7.42)
NEUTROPHILS NFR BLD: 55.4 % (ref 44–72)
NITRITE UR QL STRIP.AUTO: NEGATIVE
NRBC # BLD AUTO: 0 K/UL
NRBC BLD-RTO: 0 /100 WBC (ref 0–0.2)
PH UR STRIP.AUTO: 6 [PH] (ref 5–8)
PLATELET # BLD AUTO: 263 K/UL (ref 164–446)
PMV BLD AUTO: 9.7 FL (ref 9–12.9)
PROT SERPL-MCNC: 6.9 G/DL (ref 6–8.2)
PROT UR QL STRIP: NEGATIVE MG/DL
PROT UR-MCNC: 7.2 MG/DL (ref 0–15)
PROT/CREAT UR: 64 MG/G (ref 10–107)
RBC # BLD AUTO: 4.38 M/UL (ref 4.2–5.4)
RBC UR QL AUTO: NEGATIVE
SP GR UR STRIP.AUTO: 1.02
UROBILINOGEN UR STRIP.AUTO-MCNC: 1 EU/DL
WBC # BLD AUTO: 4 K/UL (ref 4.8–10.8)

## 2025-06-20 PROCEDURE — 84156 ASSAY OF PROTEIN URINE: CPT

## 2025-06-20 PROCEDURE — 36415 COLL VENOUS BLD VENIPUNCTURE: CPT

## 2025-06-20 PROCEDURE — 86140 C-REACTIVE PROTEIN: CPT

## 2025-06-20 PROCEDURE — 82570 ASSAY OF URINE CREATININE: CPT

## 2025-06-20 PROCEDURE — 81003 URINALYSIS AUTO W/O SCOPE: CPT

## 2025-06-20 PROCEDURE — 80076 HEPATIC FUNCTION PANEL: CPT

## 2025-06-20 PROCEDURE — 86225 DNA ANTIBODY NATIVE: CPT

## 2025-06-20 PROCEDURE — 85652 RBC SED RATE AUTOMATED: CPT

## 2025-06-20 PROCEDURE — 86160 COMPLEMENT ANTIGEN: CPT | Mod: 91

## 2025-06-20 PROCEDURE — 85025 COMPLETE CBC W/AUTO DIFF WBC: CPT

## 2025-06-20 PROCEDURE — 86480 TB TEST CELL IMMUN MEASURE: CPT

## 2025-06-22 LAB
GAMMA INTERFERON BACKGROUND BLD IA-ACNC: 0.02 IU/ML
M TB IFN-G BLD-IMP: NEGATIVE
M TB IFN-G CD4+ BCKGRND COR BLD-ACNC: 0 IU/ML
MITOGEN IGNF BCKGRD COR BLD-ACNC: >10 IU/ML
QFT TB2 - NIL TBQ2: 0 IU/ML

## 2025-06-23 LAB — DSDNA AB TITR SER CLIF: NORMAL {TITER}

## 2025-07-03 ENCOUNTER — HOSPITAL ENCOUNTER (OUTPATIENT)
Facility: MEDICAL CENTER | Age: 50
End: 2025-07-03
Attending: INTERNAL MEDICINE
Payer: COMMERCIAL

## 2025-07-03 ENCOUNTER — HOSPITAL ENCOUNTER (OUTPATIENT)
Facility: MEDICAL CENTER | Age: 50
End: 2025-07-03
Attending: REGISTERED NURSE
Payer: COMMERCIAL

## 2025-07-03 LAB
ALBUMIN SERPL BCP-MCNC: 3.7 G/DL (ref 3.2–4.9)
ALBUMIN/GLOB SERPL: 1.3 G/DL
ALP SERPL-CCNC: 55 U/L (ref 30–99)
ALT SERPL-CCNC: 12 U/L (ref 2–50)
ANION GAP SERPL CALC-SCNC: 11 MMOL/L (ref 7–16)
AST SERPL-CCNC: 22 U/L (ref 12–45)
BILIRUB SERPL-MCNC: 0.4 MG/DL (ref 0.1–1.5)
BUN SERPL-MCNC: 10 MG/DL (ref 8–22)
CALCIUM ALBUM COR SERPL-MCNC: 9.1 MG/DL (ref 8.5–10.5)
CALCIUM SERPL-MCNC: 8.9 MG/DL (ref 8.5–10.5)
CHLORIDE SERPL-SCNC: 105 MMOL/L (ref 96–112)
CHOLEST SERPL-MCNC: 167 MG/DL (ref 100–199)
CO2 SERPL-SCNC: 24 MMOL/L (ref 20–33)
CREAT SERPL-MCNC: 0.78 MG/DL (ref 0.5–1.4)
CREAT SERPL-MCNC: 0.81 MG/DL (ref 0.5–1.4)
FASTING STATUS PATIENT QL REPORTED: NORMAL
GFR SERPLBLD CREATININE-BSD FMLA CKD-EPI: 90 ML/MIN/1.73 M 2
GFR SERPLBLD CREATININE-BSD FMLA CKD-EPI: 94 ML/MIN/1.73 M 2
GLOBULIN SER CALC-MCNC: 2.9 G/DL (ref 1.9–3.5)
GLUCOSE SERPL-MCNC: 89 MG/DL (ref 65–99)
HDLC SERPL-MCNC: 68 MG/DL
LDLC SERPL CALC-MCNC: 91 MG/DL
POTASSIUM SERPL-SCNC: 3.6 MMOL/L (ref 3.6–5.5)
PROT SERPL-MCNC: 6.6 G/DL (ref 6–8.2)
SODIUM SERPL-SCNC: 140 MMOL/L (ref 135–145)
TRIGL SERPL-MCNC: 41 MG/DL (ref 0–149)
TSH SERPL DL<=0.005 MIU/L-ACNC: 2.54 UIU/ML (ref 0.38–5.33)
TSH SERPL DL<=0.005 MIU/L-ACNC: 2.59 UIU/ML (ref 0.38–5.33)

## 2025-07-03 PROCEDURE — 36415 COLL VENOUS BLD VENIPUNCTURE: CPT

## 2025-07-03 PROCEDURE — 80061 LIPID PANEL: CPT

## 2025-07-03 PROCEDURE — 84443 ASSAY THYROID STIM HORMONE: CPT

## 2025-07-03 PROCEDURE — 80053 COMPREHEN METABOLIC PANEL: CPT

## 2025-07-03 PROCEDURE — 84443 ASSAY THYROID STIM HORMONE: CPT | Mod: 91

## 2025-07-03 PROCEDURE — 82565 ASSAY OF CREATININE: CPT

## (undated) DEVICE — SYRINGE SAFETY TB 1 ML 27 GA X 1/2 IN (100/BX 4BX/CA)

## (undated) DEVICE — HEAD HOLDER JUNIOR/ADULT

## (undated) DEVICE — NEPTUNE 4 PORT MANIFOLD - (20/PK)

## (undated) DEVICE — CANISTER SUCTION RIGID RED 1500CC (40EA/CA)

## (undated) DEVICE — ELECTRODE 850 FOAM ADHESIVE - HYDROGEL RADIOTRNSPRNT (50/PK)

## (undated) DEVICE — GLOVE BIOGEL SZ 8 SURGICAL PF LTX - (50PR/BX 4BX/CA)

## (undated) DEVICE — ELECTRODE DUAL RETURN W/ CORD - (50/PK)

## (undated) DEVICE — BANDAGE ELASTIC 4 HONEYCOMB - 4"X5YD LF (20/CA)"

## (undated) DEVICE — SUTURE 0 VICRYL PLUS CT-1 - 36 INCH (36/BX)

## (undated) DEVICE — PADDING CAST 4 IN STERILE - 4 X 4 YDS (24/CA)

## (undated) DEVICE — SUTURE 3-0 ETHILON FS-1 - (36/BX) 30 INCH

## (undated) DEVICE — SET EXTENSION WITH 2 PORTS (48EA/CA) ***PART #2C8610 IS A SUBSTITUTE*****

## (undated) DEVICE — LACTATED RINGERS INJ 1000 ML - (14EA/CA 60CA/PF)

## (undated) DEVICE — TOURNIQUET CUFF 34 X 4 ONE PORT DISP - STERILE (10/BX)

## (undated) DEVICE — TUBING CLEARLINK DUO-VENT - C-FLO (48EA/CA)

## (undated) DEVICE — DRAPE C-ARM LARGE 41IN X 74 IN - (10/BX 2BX/CA)

## (undated) DEVICE — MASK AIRWAY SIZE 3 UNIQUE SILICON (10/BX)

## (undated) DEVICE — GLOVE BIOGEL SZ 7.5 SURGICAL PF LTX - (50PR/BX 4BX/CA)

## (undated) DEVICE — SENSOR SPO2 NEO LNCS ADHESIVE (20/BX) SEE USER NOTES

## (undated) DEVICE — GLOVE, LITE (PAIR)

## (undated) DEVICE — SPONGE GAUZE STER 4X4 8-PL - (2/PK 50PK/BX 12BX/CS)

## (undated) DEVICE — DRAPE STRLE REG TOWEL 18X24 - (10/BX 4BX/CA)"

## (undated) DEVICE — KIT ROOM DECONTAMINATION

## (undated) DEVICE — STAPLER SKIN DISP - (6/BX 10BX/CA) VISISTAT

## (undated) DEVICE — PAD PREP 24 X 48 CUFFED - (100/CA)

## (undated) DEVICE — CANISTER SUCTION 3000ML MECHANICAL FILTER AUTO SHUTOFF MEDI-VAC NONSTERILE LF DISP  (40EA/CA)

## (undated) DEVICE — SPLINT PLASTER 5 IN X 30 IN - (50EA/BX 6BX/CA)

## (undated) DEVICE — SUCTION INSTRUMENT YANKAUER BULBOUS TIP W/O VENT (50EA/CA)

## (undated) DEVICE — BANDAGE ELASTIC STERILE VELCRO 6 X 5 YDS (25EA/CA)

## (undated) DEVICE — SPLINT, ORTHO-GLASS 6

## (undated) DEVICE — GLOVE BIOGEL PI ORTHO SZ 8.5 PF LF (40/BX)

## (undated) DEVICE — TOWELS CLOTH SURGICAL - (4/PK 20PK/CA)

## (undated) DEVICE — DRAPE C ARMOR (12EA/CA)

## (undated) DEVICE — PACK LOWER EXTREMITY - (2/CA)

## (undated) DEVICE — MASK, LARYNGEAL AIRWAY #4

## (undated) DEVICE — GOWN WARMING STANDARD FLEX - (30/CA)

## (undated) DEVICE — PROTECTOR ULNA NERVE - (36PR/CA)

## (undated) DEVICE — MASK ANESTHESIA ADULT  - (100/CA)

## (undated) DEVICE — KIT ANESTHESIA W/CIRCUIT & 3/LT BAG W/FILTER (20EA/CA)

## (undated) DEVICE — SUTURE 2-0 VICRYL PLUS CT-1 - 8 X 18 INCH(12/BX)

## (undated) DEVICE — BLADE SURGICAL #15 - (50/BX 3BX/CA)

## (undated) DEVICE — SET LEADWIRE 5 LEAD BEDSIDE DISPOSABLE ECG (1SET OF 5/EA)

## (undated) DEVICE — HUMID-VENT HEAT AND MOISTURE EXCHANGE- (50/BX)

## (undated) DEVICE — GOWN SURGEONS X-LARGE - DISP. (30/CA)

## (undated) DEVICE — DRAPE LARGE 3 QUARTER - (20/CA)

## (undated) DEVICE — CONTAINER SPECIMEN BAG OR - STERILE 4 OZ W/LID (100EA/CA)

## (undated) DEVICE — SUTURE GENERAL

## (undated) DEVICE — GUIDE TRACHE TUBE INTUBATING STYLET 5.0-10.0MM 14FR (20EA/PK)

## (undated) DEVICE — GLOVE BIOGEL INDICATOR SZ 8 SURGICAL PF LTX - (50/BX 4BX/CA)

## (undated) DEVICE — SODIUM CHL IRRIGATION 0.9% 1000ML (12EA/CA)

## (undated) DEVICE — WATER IRRIGATION STERILE 1000ML (12EA/CA)

## (undated) DEVICE — STYLETTE 6FR INFANT STERILE SINGEL ALUMINUM SUNSLIP SEALED IN PVC SHEATH (20EA/BX)

## (undated) DEVICE — DRESSING XEROFORM 1X8 - (50/BX 4BX/CA)

## (undated) DEVICE — PADDING CAST 6 IN STERILE - 6 X 4 YDS (24/CA)

## (undated) DEVICE — SPONGE GAUZESTER 4 X 4 4PLY - (128PK/CA)